# Patient Record
Sex: MALE | Race: WHITE | NOT HISPANIC OR LATINO | Employment: FULL TIME | URBAN - METROPOLITAN AREA
[De-identification: names, ages, dates, MRNs, and addresses within clinical notes are randomized per-mention and may not be internally consistent; named-entity substitution may affect disease eponyms.]

---

## 2020-02-02 ENCOUNTER — APPOINTMENT (OUTPATIENT)
Dept: URGENT CARE | Age: 35
End: 2020-02-02
Payer: OTHER MISCELLANEOUS

## 2020-02-02 ENCOUNTER — APPOINTMENT (OUTPATIENT)
Dept: RADIOLOGY | Age: 35
End: 2020-02-02
Attending: PHYSICIAN ASSISTANT
Payer: OTHER MISCELLANEOUS

## 2020-02-02 DIAGNOSIS — M25.562 ACUTE PAIN OF LEFT KNEE: Primary | ICD-10-CM

## 2020-02-02 DIAGNOSIS — M25.562 ACUTE PAIN OF LEFT KNEE: ICD-10-CM

## 2020-02-02 PROCEDURE — 73564 X-RAY EXAM KNEE 4 OR MORE: CPT

## 2020-02-02 PROCEDURE — G0382 LEV 3 HOSP TYPE B ED VISIT: HCPCS | Performed by: PHYSICIAN ASSISTANT

## 2020-02-02 PROCEDURE — 99283 EMERGENCY DEPT VISIT LOW MDM: CPT | Performed by: PHYSICIAN ASSISTANT

## 2020-02-13 ENCOUNTER — APPOINTMENT (OUTPATIENT)
Dept: URGENT CARE | Age: 35
End: 2020-02-13
Payer: OTHER MISCELLANEOUS

## 2020-02-13 PROCEDURE — 99213 OFFICE O/P EST LOW 20 MIN: CPT | Performed by: PREVENTIVE MEDICINE

## 2020-02-24 ENCOUNTER — APPOINTMENT (OUTPATIENT)
Dept: URGENT CARE | Age: 35
End: 2020-02-24
Payer: OTHER MISCELLANEOUS

## 2020-02-24 PROCEDURE — 99213 OFFICE O/P EST LOW 20 MIN: CPT | Performed by: PREVENTIVE MEDICINE

## 2020-03-04 VITALS — HEIGHT: 75 IN | BODY MASS INDEX: 39.17 KG/M2 | WEIGHT: 315 LBS

## 2020-03-04 DIAGNOSIS — M76.50 PATELLAR TENDINOSIS: ICD-10-CM

## 2020-03-04 DIAGNOSIS — S86.812A PATELLAR TENDON RUPTURE, LEFT, INITIAL ENCOUNTER: Primary | ICD-10-CM

## 2020-03-04 DIAGNOSIS — S83.282A ACUTE LATERAL MENISCUS TEAR OF LEFT KNEE, INITIAL ENCOUNTER: ICD-10-CM

## 2020-03-04 PROCEDURE — 99203 OFFICE O/P NEW LOW 30 MIN: CPT | Performed by: ORTHOPAEDIC SURGERY

## 2020-03-04 PROCEDURE — 20610 DRAIN/INJ JOINT/BURSA W/O US: CPT | Performed by: ORTHOPAEDIC SURGERY

## 2020-03-04 RX ORDER — BUPIVACAINE HYDROCHLORIDE 2.5 MG/ML
1 INJECTION, SOLUTION INFILTRATION; PERINEURAL
Status: COMPLETED | OUTPATIENT
Start: 2020-03-04 | End: 2020-03-04

## 2020-03-04 RX ORDER — LIDOCAINE HYDROCHLORIDE 10 MG/ML
1 INJECTION, SOLUTION INFILTRATION; PERINEURAL
Status: COMPLETED | OUTPATIENT
Start: 2020-03-04 | End: 2020-03-04

## 2020-03-04 RX ORDER — BETAMETHASONE SODIUM PHOSPHATE AND BETAMETHASONE ACETATE 3; 3 MG/ML; MG/ML
12 INJECTION, SUSPENSION INTRA-ARTICULAR; INTRALESIONAL; INTRAMUSCULAR; SOFT TISSUE
Status: COMPLETED | OUTPATIENT
Start: 2020-03-04 | End: 2020-03-04

## 2020-03-04 RX ADMIN — BETAMETHASONE SODIUM PHOSPHATE AND BETAMETHASONE ACETATE 12 MG: 3; 3 INJECTION, SUSPENSION INTRA-ARTICULAR; INTRALESIONAL; INTRAMUSCULAR; SOFT TISSUE at 11:38

## 2020-03-04 RX ADMIN — BUPIVACAINE HYDROCHLORIDE 1 ML: 2.5 INJECTION, SOLUTION INFILTRATION; PERINEURAL at 11:38

## 2020-03-04 RX ADMIN — LIDOCAINE HYDROCHLORIDE 1 ML: 10 INJECTION, SOLUTION INFILTRATION; PERINEURAL at 11:38

## 2020-03-04 NOTE — LETTER
March 4, 2020     Patient: Doneen Schwab   YOB: 1985   Date of Visit: 3/4/2020       To Whom it May Concern:    Doneen Schwab is under my professional care  He was seen in my office on 3/4/2020  He is to remain on sedentary desk duty only until next visit x 4 weeks  If you have any questions or concerns, please don't hesitate to call           Sincerely,          Pamela Larkin DO        CC: Doneen Schwab

## 2020-03-04 NOTE — PROGRESS NOTES
Assessment:  1  Patellar tendon rupture, left, initial encounter     2  Acute lateral meniscus tear of left knee, initial encounter  Large joint arthrocentesis: L knee   3  Patellar tendinosis       There is no problem list on file for this patient  Plan   Danial is diagnosed with a left knee lateral meniscus tear, patellar tendon tear and patellar tendinosis confirmed by MRI  Treatment options were discussed in detail with patient conservative vs surgical intervention  He has been in formal physical therapy x 4 weeks at this time  He received a CSI of the left knee at today's appointment  He is to continue with physical therapy  We discussed PRP injection for the left knee will hold off at this time to determine relief of symptoms from CSI injection  He will be seen back in the office x 4 weeks at that time pending symptoms PRP injection may be indicated and surgical intervention may be warranted due to failed conservative treatments  Subjective:     Patient ID:    Chief Complaint:Danial Radford 29 y o  male      HPI    Patient comes in today referred by occupational medicine with regards to left knee pain after kneeling on knee and when standing felt a pop while at work  The patient reports that the pain is in the lateral and anterior knee and has been going on for 6 months  The pain is rated at3 at its best and8 at its worst   The pain is described as tearing, burning  It is worsened with weight bearing, twisting, and is made better with rest   The patient has taken OTCs  for treatment  He has been in physical therapy x 4 weeks with minimal relief of symptoms up to this point        The following portions of the patient's history were reviewed and updated as appropriate: allergies, current medications, past family history, past social history, past surgical history and problem list         Social History     Socioeconomic History    Marital status: Unknown     Spouse name: Not on file    Number of children: Not on file    Years of education: Not on file    Highest education level: Not on file   Occupational History    Not on file   Social Needs    Financial resource strain: Not on file    Food insecurity:     Worry: Not on file     Inability: Not on file    Transportation needs:     Medical: Not on file     Non-medical: Not on file   Tobacco Use    Smoking status: Not on file   Substance and Sexual Activity    Alcohol use: Not on file    Drug use: Not on file    Sexual activity: Not on file   Lifestyle    Physical activity:     Days per week: Not on file     Minutes per session: Not on file    Stress: Not on file   Relationships    Social connections:     Talks on phone: Not on file     Gets together: Not on file     Attends [employer] service: Not on file     Active member of club or organization: Not on file     Attends meetings of clubs or organizations: Not on file     Relationship status: Not on file    Intimate partner violence:     Fear of current or ex partner: Not on file     Emotionally abused: Not on file     Physically abused: Not on file     Forced sexual activity: Not on file   Other Topics Concern    Not on file   Social History Narrative    Not on file     No past medical history on file  No past surgical history on file  No Known Allergies  No current outpatient medications on file prior to visit  No current facility-administered medications on file prior to visit  Objective:    Review of Systems   Constitutional: Negative  HENT: Negative  Eyes: Negative  Respiratory: Negative  Cardiovascular: Negative  Gastrointestinal: Negative  Endocrine: Negative  Genitourinary: Negative  Musculoskeletal: Positive for arthralgias, joint swelling and myalgias  Skin: Negative  Allergic/Immunologic: Negative  Neurological: Negative  Hematological: Negative  Psychiatric/Behavioral: Negative          Right Knee Exam Right knee exam is normal     Muscle Strength   The patient has normal right knee strength  Left Knee Exam     Muscle Strength   The patient has normal left knee strength  Tenderness   The patient is experiencing tenderness in the lateral joint line, patella and patellar tendon  Range of Motion   Extension: normal   Flexion: normal Left knee flexion: no crepitus with rom  Tests   Varus: negative Valgus: negative  Lachman:  Anterior - negative    Posterior - negative  Drawer:  Anterior - negative     Posterior - negative    Other   Erythema: absent  Scars: absent  Sensation: normal  Pulse: present  Swelling: none  Effusion: no effusion present    Comments: Thessaly's Test:  Positive  Cee's Test:  Questionable   Bounce Test:  Questionable               Physical Exam   Constitutional: He is oriented to person, place, and time  He appears well-developed  HENT:   Head: Normocephalic  Eyes: Conjunctivae are normal    Neck: Neck supple  Cardiovascular: Intact distal pulses  Pulmonary/Chest: Effort normal    Musculoskeletal:        Left knee: He exhibits no effusion  See left knee exam in note  Neurological: He is alert and oriented to person, place, and time  Skin: Skin is warm and dry  Psychiatric: He has a normal mood and affect         Large joint arthrocentesis: L knee  Date/Time: 3/4/2020 11:38 AM  Consent given by: patient  Site marked: site marked  Timeout: Immediately prior to procedure a time out was called to verify the correct patient, procedure, equipment, support staff and site/side marked as required   Supporting Documentation  Indications: pain and diagnostic evaluation   Procedure Details  Location: knee - L knee  Preparation: Patient was prepped and draped in the usual sterile fashion  Needle size: 22 G  Ultrasound guidance: no  Approach: anterolateral  Medications administered: 1 mL bupivacaine 0 25 %; 1 mL lidocaine 1 %; 12 mg betamethasone acetate-betamethasone sodium phosphate 6 (3-3) mg/mL    Patient tolerance: patient tolerated the procedure well with no immediate complications  Dressing:  Sterile dressing applied             I have personally reviewed pertinent films in PACS and my interpretation is MRI left knee:  Small partial tear of the patellar tendon  Tendinosis of the patellar tendon  Tear present of the lateral meniscus  Para-meniscal cyst present of the lateral knee          Scribe Attestation    I,:   Suzie Ledesma am acting as a scribe while in the presence of the attending physician :        I,:   Wilfred Zhao, DO personally performed the services described in this documentation    as scribed in my presence :            Portions of the record may have been created with voice recognition software   Occasional wrong word or "sound a like" substitutions may have occurred due to the inherent limitations of voice recognition software   Read the chart carefully and recognize, using context, where substitutions have occurred

## 2020-04-03 DIAGNOSIS — S86.812A PATELLAR TENDON RUPTURE, LEFT, INITIAL ENCOUNTER: ICD-10-CM

## 2020-04-03 DIAGNOSIS — M76.50 PATELLAR TENDINOSIS: ICD-10-CM

## 2020-04-03 DIAGNOSIS — S83.282D ACUTE LATERAL MENISCUS TEAR OF LEFT KNEE, SUBSEQUENT ENCOUNTER: Primary | ICD-10-CM

## 2020-04-03 PROBLEM — S83.282A ACUTE LATERAL MENISCUS TEAR OF LEFT KNEE: Status: ACTIVE | Noted: 2020-04-03

## 2020-04-03 PROCEDURE — G2012 BRIEF CHECK IN BY MD/QHP: HCPCS | Performed by: ORTHOPAEDIC SURGERY

## 2020-05-01 ENCOUNTER — OFFICE VISIT (OUTPATIENT)
Dept: OBGYN CLINIC | Facility: CLINIC | Age: 35
End: 2020-05-01
Payer: OTHER MISCELLANEOUS

## 2020-05-01 DIAGNOSIS — M76.50 PATELLAR TENDINOSIS: ICD-10-CM

## 2020-05-01 DIAGNOSIS — S83.282D ACUTE LATERAL MENISCUS TEAR OF LEFT KNEE, SUBSEQUENT ENCOUNTER: Primary | ICD-10-CM

## 2020-05-01 PROCEDURE — 99213 OFFICE O/P EST LOW 20 MIN: CPT | Performed by: PHYSICIAN ASSISTANT

## 2020-06-22 VITALS — BODY MASS INDEX: 39.17 KG/M2 | WEIGHT: 315 LBS | HEIGHT: 75 IN

## 2020-06-22 DIAGNOSIS — S83.282D ACUTE LATERAL MENISCUS TEAR OF LEFT KNEE, SUBSEQUENT ENCOUNTER: ICD-10-CM

## 2020-06-22 DIAGNOSIS — S86.812D PATELLAR TENDON RUPTURE, LEFT, SUBSEQUENT ENCOUNTER: Primary | ICD-10-CM

## 2020-06-22 PROCEDURE — 99213 OFFICE O/P EST LOW 20 MIN: CPT | Performed by: ORTHOPAEDIC SURGERY

## 2020-07-13 VITALS
SYSTOLIC BLOOD PRESSURE: 146 MMHG | WEIGHT: 315 LBS | HEART RATE: 93 BPM | HEIGHT: 75 IN | BODY MASS INDEX: 39.17 KG/M2 | DIASTOLIC BLOOD PRESSURE: 89 MMHG

## 2020-07-13 DIAGNOSIS — S86.812A PATELLAR TENDON RUPTURE, LEFT, INITIAL ENCOUNTER: Primary | ICD-10-CM

## 2020-07-13 PROCEDURE — 99212 OFFICE O/P EST SF 10 MIN: CPT | Performed by: ORTHOPAEDIC SURGERY

## 2020-07-13 PROCEDURE — 20610 DRAIN/INJ JOINT/BURSA W/O US: CPT | Performed by: ORTHOPAEDIC SURGERY

## 2020-07-13 NOTE — PROGRESS NOTES
1  Patellar tendon rupture, left, initial encounter       Patient is here for his 1st injection of PRP in the patellar tendon into the left knee  Patient reports pain  All organ systems normal  Physical exam of the knee shows no effusion no ecchymosis        Large joint arthrocentesis: L knee  Date/Time: 7/13/2020 11:01 AM  Consent given by: patient  Timeout: Immediately prior to procedure a time out was called to verify the correct patient, procedure, equipment, support staff and site/side marked as required   Supporting Documentation  Indications: pain   Procedure Details  Location: knee - L knee  Needle size: 22 G  Ultrasound guidance: no  Approach: anterolateral    Patient tolerance: patient tolerated the procedure well with no immediate complications    PRP  Two locations were injected 1 in for laterally and 1 superomedially and the patellar tendon          Patient tolerated procedure follow up 4 weeks

## 2020-07-13 NOTE — TELEPHONE ENCOUNTER
Bruising can be seen after PRP injection  He can use ice for the 1st 24-48 hours 20 minutes at a time 2-3x a day, elevate, motrin can be taken   If symptoms not better or worsening after 2-3 days he can call us back

## 2020-07-13 NOTE — TELEPHONE ENCOUNTER
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   Best contact [phone]  Thank you

## 2020-07-13 NOTE — TELEPHONE ENCOUNTER
Patient stated he is bruising from the PRP injection  Stated he would like to know if he can take motrin  Unsure of the PRP injection recommendations  Please clarify   Thank you

## 2020-07-13 NOTE — TELEPHONE ENCOUNTER
Spoke to patient, advised above information  Advised motrin 600 mg Q6h , not more than 2400 mg in 24hrs  Verbalized understanding

## 2020-08-12 ENCOUNTER — APPOINTMENT (OUTPATIENT)
Dept: LAB | Facility: AMBULARY SURGERY CENTER | Age: 35
End: 2020-08-12
Payer: OTHER MISCELLANEOUS

## 2020-08-12 VITALS
DIASTOLIC BLOOD PRESSURE: 90 MMHG | HEART RATE: 77 BPM | BODY MASS INDEX: 39.17 KG/M2 | SYSTOLIC BLOOD PRESSURE: 138 MMHG | HEIGHT: 75 IN | WEIGHT: 315 LBS

## 2020-08-12 DIAGNOSIS — Z01.818 PRE-OP EVALUATION: ICD-10-CM

## 2020-08-12 DIAGNOSIS — M76.50 PATELLAR TENDINOSIS: ICD-10-CM

## 2020-08-12 DIAGNOSIS — S83.282D ACUTE LATERAL MENISCUS TEAR OF LEFT KNEE, SUBSEQUENT ENCOUNTER: ICD-10-CM

## 2020-08-12 DIAGNOSIS — R73.09 ELEVATED RANDOM BLOOD GLUCOSE LEVEL: ICD-10-CM

## 2020-08-12 DIAGNOSIS — S83.282D ACUTE LATERAL MENISCUS TEAR OF LEFT KNEE, SUBSEQUENT ENCOUNTER: Primary | ICD-10-CM

## 2020-08-12 LAB
ANION GAP SERPL CALCULATED.3IONS-SCNC: 5 MMOL/L (ref 4–13)
BASOPHILS # BLD AUTO: 0.05 THOUSANDS/ΜL (ref 0–0.1)
BASOPHILS NFR BLD AUTO: 1 % (ref 0–1)
BUN SERPL-MCNC: 11 MG/DL (ref 5–25)
CALCIUM SERPL-MCNC: 8.8 MG/DL (ref 8.3–10.1)
CHLORIDE SERPL-SCNC: 110 MMOL/L (ref 100–108)
CO2 SERPL-SCNC: 26 MMOL/L (ref 21–32)
CREAT SERPL-MCNC: 0.84 MG/DL (ref 0.6–1.3)
EOSINOPHIL # BLD AUTO: 0.48 THOUSAND/ΜL (ref 0–0.61)
EOSINOPHIL NFR BLD AUTO: 7 % (ref 0–6)
ERYTHROCYTE [DISTWIDTH] IN BLOOD BY AUTOMATED COUNT: 12.8 % (ref 11.6–15.1)
GFR SERPL CREATININE-BSD FRML MDRD: 114 ML/MIN/1.73SQ M
GLUCOSE P FAST SERPL-MCNC: 141 MG/DL (ref 65–99)
HCT VFR BLD AUTO: 46 % (ref 36.5–49.3)
HGB BLD-MCNC: 15.3 G/DL (ref 12–17)
IMM GRANULOCYTES # BLD AUTO: 0.03 THOUSAND/UL (ref 0–0.2)
IMM GRANULOCYTES NFR BLD AUTO: 1 % (ref 0–2)
LYMPHOCYTES # BLD AUTO: 1.83 THOUSANDS/ΜL (ref 0.6–4.47)
LYMPHOCYTES NFR BLD AUTO: 28 % (ref 14–44)
MCH RBC QN AUTO: 30.3 PG (ref 26.8–34.3)
MCHC RBC AUTO-ENTMCNC: 33.3 G/DL (ref 31.4–37.4)
MCV RBC AUTO: 91 FL (ref 82–98)
MONOCYTES # BLD AUTO: 0.52 THOUSAND/ΜL (ref 0.17–1.22)
MONOCYTES NFR BLD AUTO: 8 % (ref 4–12)
NEUTROPHILS # BLD AUTO: 3.59 THOUSANDS/ΜL (ref 1.85–7.62)
NEUTS SEG NFR BLD AUTO: 55 % (ref 43–75)
NRBC BLD AUTO-RTO: 0 /100 WBCS
PLATELET # BLD AUTO: 214 THOUSANDS/UL (ref 149–390)
PMV BLD AUTO: 12.3 FL (ref 8.9–12.7)
POTASSIUM SERPL-SCNC: 3.9 MMOL/L (ref 3.5–5.3)
RBC # BLD AUTO: 5.05 MILLION/UL (ref 3.88–5.62)
SODIUM SERPL-SCNC: 141 MMOL/L (ref 136–145)
WBC # BLD AUTO: 6.5 THOUSAND/UL (ref 4.31–10.16)

## 2020-08-12 PROCEDURE — 80048 BASIC METABOLIC PNL TOTAL CA: CPT

## 2020-08-12 PROCEDURE — 83036 HEMOGLOBIN GLYCOSYLATED A1C: CPT

## 2020-08-12 PROCEDURE — 99213 OFFICE O/P EST LOW 20 MIN: CPT | Performed by: PHYSICIAN ASSISTANT

## 2020-08-12 PROCEDURE — 85025 COMPLETE CBC W/AUTO DIFF WBC: CPT

## 2020-08-12 PROCEDURE — 36415 COLL VENOUS BLD VENIPUNCTURE: CPT

## 2020-08-12 RX ORDER — CHLORHEXIDINE GLUCONATE 4 G/100ML
SOLUTION TOPICAL DAILY PRN
Status: CANCELLED | OUTPATIENT
Start: 2020-08-12

## 2020-08-12 NOTE — H&P (VIEW-ONLY)
Assessment/Plan:  1  Acute lateral meniscus tear of left knee, subsequent encounter  Crutches    Case request operating room: ARTHROSCOPY KNEE PARTIAL LATERAL MENISCECTOMY    Basic metabolic panel    CBC and differential    Case request operating room: ARTHROSCOPY KNEE PARTIAL LATERAL MENISCECTOMY   2  Patellar tendinosis       Patient Active Problem List   Diagnosis    Patellar tendon rupture, left, initial encounter    Acute lateral meniscus tear of left knee    Patellar tendinosis       Discussion/Summary:    29 y o  male  Left knee pain secondary to lateral meniscal tear, patellar tendinosis  Patient did not have any significant relief from the PRP injection however he is still having meniscal signs and symptoms consistent with lateral meniscus tear he has tried and failed conservative treatments for this as well  Including physical therapy  He would like to proceed with arthroscopic partial lateral meniscectomy  The procedure was discussed in some detail with him today, we will begin the process of getting him scheduled for surgery, on day of surgery risks and benefits will be reviewed by Dr Julia Concepcion  And formal consent will be obtained prior to surgery  He will follow up after surgery for suture removal    The assessment and plan were formulated by Dr Julia Concepcion and I assisted in carrying it out  Subjective:   Patient ID: Dennis Carcamo is a 29 y o  male   HPI    Patient presents to the office for follow up of  Left knee pain lateral meniscus tear patellar tendinosis    Since the last visit, the patient reports   No improvements with PRP injections directly into the patellar tendon  Still has anterior knee pain but also anterior lateral and posterior lateral knee pain sharp pain notes a knee times  Denies any worsening of the symptoms in the anterior knee  Denies any weakness in general    No numbness tingling fevers or chills   Patient  denies any new injuries to the   Left knee since the last visit      The following portions of the patient's history were reviewed and updated as appropriate: allergies, current medications, past family history, past social history, past surgical history and problem list     Social History     Socioeconomic History    Marital status: Single     Spouse name: Not on file    Number of children: Not on file    Years of education: Not on file    Highest education level: Not on file   Occupational History    Not on file   Social Needs    Financial resource strain: Not on file    Food insecurity     Worry: Not on file     Inability: Not on file    Transportation needs     Medical: Not on file     Non-medical: Not on file   Tobacco Use    Smoking status: Current Every Day Smoker     Packs/day: 0 25     Types: Cigarettes    Smokeless tobacco: Never Used   Substance and Sexual Activity    Alcohol use: Not on file    Drug use: Not on file    Sexual activity: Not on file   Lifestyle    Physical activity     Days per week: Not on file     Minutes per session: Not on file    Stress: Not on file   Relationships    Social connections     Talks on phone: Not on file     Gets together: Not on file     Attends [employer] service: Not on file     Active member of club or organization: Not on file     Attends meetings of clubs or organizations: Not on file     Relationship status: Not on file    Intimate partner violence     Fear of current or ex partner: Not on file     Emotionally abused: Not on file     Physically abused: Not on file     Forced sexual activity: Not on file   Other Topics Concern    Not on file   Social History Narrative    Not on file     History reviewed  No pertinent past medical history  History reviewed  No pertinent surgical history  No Known Allergies  No current outpatient medications on file prior to visit  No current facility-administered medications on file prior to visit          Review of Systems      Objective:    Vitals:    08/12/20 0903 BP: 138/90   Pulse: 77       Physical Exam  Constitutional:       General: He is not in acute distress  Appearance: He is well-developed  He is obese  HENT:      Head: Normocephalic and atraumatic  Eyes:      General: No scleral icterus  Conjunctiva/sclera: Conjunctivae normal    Neck:      Musculoskeletal: Neck supple  Trachea: No tracheal deviation  Cardiovascular:      Comments: No discernible arrhthymias   Pulmonary:      Effort: Pulmonary effort is normal  No respiratory distress  Musculoskeletal:      Left knee: He exhibits no effusion  Skin:     General: Skin is warm and dry  Neurological:      Mental Status: He is alert and oriented to person, place, and time  Psychiatric:         Behavior: Behavior normal          Left Knee Exam     Muscle Strength   The patient has normal left knee strength  Tenderness   The patient is experiencing tenderness in the patellar tendon and lateral joint line (Lateral joint line tenderness no palpable defect of the patellar tendon)  Range of Motion   The patient has normal left knee ROM  Tests   Cee:  Medial - negative Lateral - positive    Other   Erythema: absent  Scars: absent  Sensation: normal  Pulse: present  Swelling: none  Effusion: no effusion present            I have personally reviewed pertinent films in PACS  Procedures  No Procedures performed today    Portions of the record may have been created with voice recognition software  Occasional wrong word or "sound a like" substitutions may have occurred due to the inherent limitations of voice recognition software  Read the chart carefully and recognize, using context, where substitutions have occurred

## 2020-08-12 NOTE — PROGRESS NOTES
Assessment/Plan:  1  Acute lateral meniscus tear of left knee, subsequent encounter  Crutches    Case request operating room: ARTHROSCOPY KNEE PARTIAL LATERAL MENISCECTOMY    Basic metabolic panel    CBC and differential    Case request operating room: ARTHROSCOPY KNEE PARTIAL LATERAL MENISCECTOMY   2  Patellar tendinosis       Patient Active Problem List   Diagnosis    Patellar tendon rupture, left, initial encounter    Acute lateral meniscus tear of left knee    Patellar tendinosis       Discussion/Summary:    29 y o  male  Left knee pain secondary to lateral meniscal tear, patellar tendinosis  Patient did not have any significant relief from the PRP injection however he is still having meniscal signs and symptoms consistent with lateral meniscus tear he has tried and failed conservative treatments for this as well  Including physical therapy  He would like to proceed with arthroscopic partial lateral meniscectomy  The procedure was discussed in some detail with him today, we will begin the process of getting him scheduled for surgery, on day of surgery risks and benefits will be reviewed by Dr Romana Prose  And formal consent will be obtained prior to surgery  He will follow up after surgery for suture removal    The assessment and plan were formulated by Dr Romana Prose and I assisted in carrying it out  Subjective:   Patient ID: Iram Khan is a 29 y o  male   HPI    Patient presents to the office for follow up of  Left knee pain lateral meniscus tear patellar tendinosis    Since the last visit, the patient reports   No improvements with PRP injections directly into the patellar tendon  Still has anterior knee pain but also anterior lateral and posterior lateral knee pain sharp pain notes a knee times  Denies any worsening of the symptoms in the anterior knee  Denies any weakness in general    No numbness tingling fevers or chills   Patient  denies any new injuries to the   Left knee since the last visit      The following portions of the patient's history were reviewed and updated as appropriate: allergies, current medications, past family history, past social history, past surgical history and problem list     Social History     Socioeconomic History    Marital status: Single     Spouse name: Not on file    Number of children: Not on file    Years of education: Not on file    Highest education level: Not on file   Occupational History    Not on file   Social Needs    Financial resource strain: Not on file    Food insecurity     Worry: Not on file     Inability: Not on file    Transportation needs     Medical: Not on file     Non-medical: Not on file   Tobacco Use    Smoking status: Current Every Day Smoker     Packs/day: 0 25     Types: Cigarettes    Smokeless tobacco: Never Used   Substance and Sexual Activity    Alcohol use: Not on file    Drug use: Not on file    Sexual activity: Not on file   Lifestyle    Physical activity     Days per week: Not on file     Minutes per session: Not on file    Stress: Not on file   Relationships    Social connections     Talks on phone: Not on file     Gets together: Not on file     Attends [employer] service: Not on file     Active member of club or organization: Not on file     Attends meetings of clubs or organizations: Not on file     Relationship status: Not on file    Intimate partner violence     Fear of current or ex partner: Not on file     Emotionally abused: Not on file     Physically abused: Not on file     Forced sexual activity: Not on file   Other Topics Concern    Not on file   Social History Narrative    Not on file     History reviewed  No pertinent past medical history  History reviewed  No pertinent surgical history  No Known Allergies  No current outpatient medications on file prior to visit  No current facility-administered medications on file prior to visit          Review of Systems      Objective:    Vitals:    08/12/20 0903 BP: 138/90   Pulse: 77       Physical Exam  Constitutional:       General: He is not in acute distress  Appearance: He is well-developed  He is obese  HENT:      Head: Normocephalic and atraumatic  Eyes:      General: No scleral icterus  Conjunctiva/sclera: Conjunctivae normal    Neck:      Musculoskeletal: Neck supple  Trachea: No tracheal deviation  Cardiovascular:      Comments: No discernible arrhthymias   Pulmonary:      Effort: Pulmonary effort is normal  No respiratory distress  Musculoskeletal:      Left knee: He exhibits no effusion  Skin:     General: Skin is warm and dry  Neurological:      Mental Status: He is alert and oriented to person, place, and time  Psychiatric:         Behavior: Behavior normal          Left Knee Exam     Muscle Strength   The patient has normal left knee strength  Tenderness   The patient is experiencing tenderness in the patellar tendon and lateral joint line (Lateral joint line tenderness no palpable defect of the patellar tendon)  Range of Motion   The patient has normal left knee ROM  Tests   Cee:  Medial - negative Lateral - positive    Other   Erythema: absent  Scars: absent  Sensation: normal  Pulse: present  Swelling: none  Effusion: no effusion present            I have personally reviewed pertinent films in PACS  Procedures  No Procedures performed today    Portions of the record may have been created with voice recognition software  Occasional wrong word or "sound a like" substitutions may have occurred due to the inherent limitations of voice recognition software  Read the chart carefully and recognize, using context, where substitutions have occurred

## 2020-08-13 DIAGNOSIS — R73.09 ELEVATED RANDOM BLOOD GLUCOSE LEVEL: Primary | ICD-10-CM

## 2020-08-13 DIAGNOSIS — E66.01 MORBID OBESITY WITH BMI OF 45.0-49.9, ADULT (HCC): ICD-10-CM

## 2020-08-13 DIAGNOSIS — S83.282D ACUTE LATERAL MENISCUS TEAR OF LEFT KNEE, SUBSEQUENT ENCOUNTER: ICD-10-CM

## 2020-08-13 DIAGNOSIS — Z01.818 PRE-OP EVALUATION: ICD-10-CM

## 2020-08-13 LAB
EST. AVERAGE GLUCOSE BLD GHB EST-MCNC: 143 MG/DL
HBA1C MFR BLD: 6.6 %

## 2020-08-13 NOTE — TELEPHONE ENCOUNTER
I called and spoke to the patient  Informed him of elevated fasting blood glucose, he told me he had not been fasting directly before he had eaten about 4-5 hours prior to his BMP yesterday, I ordered an A1C to further evaluate prior to surgery  Can you please   Call the patient let him know that we are checking with the lab to see they can use his existing specimen from yesterday to do this new order    Can you please check with the lab then see if they can do this and let the patient know whether not he needs to come for an additional blood draw

## 2020-08-13 NOTE — TELEPHONE ENCOUNTER
I do not believe he has to be fasting for hemoglobin A1C   Dr Jermain Lezama also does want him to see a family doctor before surgery due to the elevated glucose can you please let him know and get him set up with family med

## 2020-08-13 NOTE — TELEPHONE ENCOUNTER
S/W [facility] lab  Pt will have to come in to have blood drawn for the hgb A1c  S/w pt he is aware he should be fasting and will have lab drawn over the next week

## 2020-08-14 ENCOUNTER — TELEPHONE (OUTPATIENT)
Dept: OBGYN CLINIC | Facility: CLINIC | Age: 35
End: 2020-08-14

## 2020-08-14 ENCOUNTER — TELEPHONE (OUTPATIENT)
Dept: OBGYN CLINIC | Facility: HOSPITAL | Age: 35
End: 2020-08-14

## 2020-08-14 DIAGNOSIS — E11.9 TYPE 2 DIABETES MELLITUS WITHOUT COMPLICATION, WITHOUT LONG-TERM CURRENT USE OF INSULIN (HCC): ICD-10-CM

## 2020-08-14 DIAGNOSIS — Z01.818 PRE-OP EVALUATION: Primary | ICD-10-CM

## 2020-08-14 DIAGNOSIS — E66.01 MORBID OBESITY WITH BMI OF 45.0-49.9, ADULT (HCC): ICD-10-CM

## 2020-08-14 NOTE — TELEPHONE ENCOUNTER
Called and spoke to the patient  I discussed his hemoglobin A1c that is within the diabetic range he has an appointment next week with the family doctor for clearance    I also ordered an EKG can you please contact him see if we can get him set up to do this or if the family doctor can do this for him

## 2020-08-27 ENCOUNTER — HOSPITAL ENCOUNTER (EMERGENCY)
Facility: HOSPITAL | Age: 35
Discharge: HOME | End: 2020-08-28
Attending: EMERGENCY MEDICINE | Admitting: EMERGENCY MEDICINE
Payer: COMMERCIAL

## 2020-08-27 ENCOUNTER — APPOINTMENT (EMERGENCY)
Dept: CT IMAGING | Facility: HOSPITAL | Age: 35
End: 2020-08-27
Payer: COMMERCIAL

## 2020-08-27 DIAGNOSIS — K52.9 JEJUNITIS: Primary | ICD-10-CM

## 2020-08-27 DIAGNOSIS — R11.0 NAUSEA: ICD-10-CM

## 2020-08-27 LAB
ALBUMIN SERPL BCP-MCNC: 3.9 G/DL (ref 3.5–5)
ALP SERPL-CCNC: 67 U/L (ref 46–116)
ALT SERPL W P-5'-P-CCNC: 93 U/L (ref 12–78)
ANION GAP SERPL CALCULATED.3IONS-SCNC: 7 MMOL/L (ref 4–13)
APTT PPP: 26 SECONDS (ref 23–37)
AST SERPL W P-5'-P-CCNC: 36 U/L (ref 5–45)
BASOPHILS # BLD AUTO: 0.04 THOUSANDS/ΜL (ref 0–0.1)
BASOPHILS NFR BLD AUTO: 0 % (ref 0–1)
BILIRUB SERPL-MCNC: 0.53 MG/DL (ref 0.2–1)
BILIRUB UR QL STRIP: NEGATIVE
BUN SERPL-MCNC: 11 MG/DL (ref 5–25)
CALCIUM SERPL-MCNC: 9.4 MG/DL (ref 8.3–10.1)
CHLORIDE SERPL-SCNC: 100 MMOL/L (ref 100–108)
CLARITY UR: CLEAR
CO2 SERPL-SCNC: 28 MMOL/L (ref 21–32)
COLOR UR: YELLOW
CREAT SERPL-MCNC: 0.98 MG/DL (ref 0.6–1.3)
EOSINOPHIL # BLD AUTO: 0.47 THOUSAND/ΜL (ref 0–0.61)
EOSINOPHIL NFR BLD AUTO: 4 % (ref 0–6)
ERYTHROCYTE [DISTWIDTH] IN BLOOD BY AUTOMATED COUNT: 12.4 % (ref 11.6–15.1)
GFR SERPL CREATININE-BSD FRML MDRD: 100 ML/MIN/1.73SQ M
GLUCOSE SERPL-MCNC: 165 MG/DL (ref 65–140)
GLUCOSE UR STRIP-MCNC: ABNORMAL MG/DL
HCT VFR BLD AUTO: 46.6 % (ref 36.5–49.3)
HGB BLD-MCNC: 16.2 G/DL (ref 12–17)
HGB UR QL STRIP.AUTO: NEGATIVE
IMM GRANULOCYTES # BLD AUTO: 0.08 THOUSAND/UL (ref 0–0.2)
IMM GRANULOCYTES NFR BLD AUTO: 1 % (ref 0–2)
INR PPP: 1 (ref 0.84–1.19)
KETONES UR STRIP-MCNC: NEGATIVE MG/DL
LACTATE SERPL-SCNC: 1.6 MMOL/L (ref 0.5–2)
LEUKOCYTE ESTERASE UR QL STRIP: NEGATIVE
LIPASE SERPL-CCNC: 329 U/L (ref 73–393)
LYMPHOCYTES # BLD AUTO: 1.52 THOUSANDS/ΜL (ref 0.6–4.47)
LYMPHOCYTES NFR BLD AUTO: 12 % (ref 14–44)
MCH RBC QN AUTO: 30.9 PG (ref 26.8–34.3)
MCHC RBC AUTO-ENTMCNC: 34.8 G/DL (ref 31.4–37.4)
MCV RBC AUTO: 89 FL (ref 82–98)
MONOCYTES # BLD AUTO: 0.8 THOUSAND/ΜL (ref 0.17–1.22)
MONOCYTES NFR BLD AUTO: 6 % (ref 4–12)
NEUTROPHILS # BLD AUTO: 10.11 THOUSANDS/ΜL (ref 1.85–7.62)
NEUTS SEG NFR BLD AUTO: 77 % (ref 43–75)
NITRITE UR QL STRIP: NEGATIVE
NRBC BLD AUTO-RTO: 0 /100 WBCS
PH UR STRIP.AUTO: 6 [PH]
PLATELET # BLD AUTO: 199 THOUSANDS/UL (ref 149–390)
PMV BLD AUTO: 11.4 FL (ref 8.9–12.7)
POTASSIUM SERPL-SCNC: 4.1 MMOL/L (ref 3.5–5.3)
PROT SERPL-MCNC: 7.9 G/DL (ref 6.4–8.2)
PROT UR STRIP-MCNC: NEGATIVE MG/DL
PROTHROMBIN TIME: 13.3 SECONDS (ref 11.6–14.5)
RBC # BLD AUTO: 5.24 MILLION/UL (ref 3.88–5.62)
SODIUM SERPL-SCNC: 135 MMOL/L (ref 136–145)
SP GR UR STRIP.AUTO: 1.01 (ref 1–1.03)
TROPONIN I SERPL-MCNC: <0.02 NG/ML
UROBILINOGEN UR QL STRIP.AUTO: 0.2 E.U./DL
WBC # BLD AUTO: 13.02 THOUSAND/UL (ref 4.31–10.16)

## 2020-08-27 PROCEDURE — 85025 COMPLETE CBC W/AUTO DIFF WBC: CPT | Performed by: EMERGENCY MEDICINE

## 2020-08-27 PROCEDURE — 85610 PROTHROMBIN TIME: CPT | Performed by: PHYSICIAN ASSISTANT

## 2020-08-27 PROCEDURE — 81003 URINALYSIS AUTO W/O SCOPE: CPT | Performed by: EMERGENCY MEDICINE

## 2020-08-27 PROCEDURE — 80053 COMPREHEN METABOLIC PANEL: CPT | Performed by: EMERGENCY MEDICINE

## 2020-08-27 PROCEDURE — 96375 TX/PRO/DX INJ NEW DRUG ADDON: CPT

## 2020-08-27 PROCEDURE — 83690 ASSAY OF LIPASE: CPT | Performed by: EMERGENCY MEDICINE

## 2020-08-27 PROCEDURE — 93005 ELECTROCARDIOGRAM TRACING: CPT

## 2020-08-27 PROCEDURE — 36415 COLL VENOUS BLD VENIPUNCTURE: CPT | Performed by: EMERGENCY MEDICINE

## 2020-08-27 PROCEDURE — 85730 THROMBOPLASTIN TIME PARTIAL: CPT | Performed by: PHYSICIAN ASSISTANT

## 2020-08-27 PROCEDURE — 96374 THER/PROPH/DIAG INJ IV PUSH: CPT

## 2020-08-27 PROCEDURE — 74177 CT ABD & PELVIS W/CONTRAST: CPT

## 2020-08-27 PROCEDURE — 96361 HYDRATE IV INFUSION ADD-ON: CPT

## 2020-08-27 PROCEDURE — G1004 CDSM NDSC: HCPCS

## 2020-08-27 PROCEDURE — 99284 EMERGENCY DEPT VISIT MOD MDM: CPT

## 2020-08-27 PROCEDURE — 83605 ASSAY OF LACTIC ACID: CPT | Performed by: PHYSICIAN ASSISTANT

## 2020-08-27 PROCEDURE — 84484 ASSAY OF TROPONIN QUANT: CPT | Performed by: EMERGENCY MEDICINE

## 2020-08-27 RX ORDER — DICYCLOMINE HCL 20 MG
20 TABLET ORAL ONCE
Status: COMPLETED | OUTPATIENT
Start: 2020-08-28 | End: 2020-08-28

## 2020-08-27 RX ORDER — KETOROLAC TROMETHAMINE 30 MG/ML
15 INJECTION, SOLUTION INTRAMUSCULAR; INTRAVENOUS ONCE
Status: COMPLETED | OUTPATIENT
Start: 2020-08-28 | End: 2020-08-28

## 2020-08-27 RX ORDER — LIDOCAINE HYDROCHLORIDE 20 MG/ML
15 SOLUTION OROPHARYNGEAL ONCE
Status: COMPLETED | OUTPATIENT
Start: 2020-08-27 | End: 2020-08-27

## 2020-08-27 RX ORDER — MAGNESIUM HYDROXIDE/ALUMINUM HYDROXICE/SIMETHICONE 120; 1200; 1200 MG/30ML; MG/30ML; MG/30ML
30 SUSPENSION ORAL ONCE
Status: COMPLETED | OUTPATIENT
Start: 2020-08-27 | End: 2020-08-27

## 2020-08-27 RX ORDER — ONDANSETRON 2 MG/ML
4 INJECTION INTRAMUSCULAR; INTRAVENOUS ONCE
Status: COMPLETED | OUTPATIENT
Start: 2020-08-27 | End: 2020-08-27

## 2020-08-27 RX ADMIN — ALUMINUM HYDROXIDE, MAGNESIUM HYDROXIDE, AND SIMETHICONE 30 ML: 200; 200; 20 SUSPENSION ORAL at 23:25

## 2020-08-27 RX ADMIN — FAMOTIDINE 20 MG: 10 INJECTION, SOLUTION INTRAVENOUS at 23:29

## 2020-08-27 RX ADMIN — LIDOCAINE HYDROCHLORIDE 15 ML: 20 SOLUTION ORAL; TOPICAL at 23:26

## 2020-08-27 RX ADMIN — SODIUM CHLORIDE 1000 ML: 0.9 INJECTION, SOLUTION INTRAVENOUS at 23:25

## 2020-08-27 RX ADMIN — ONDANSETRON 4 MG: 2 INJECTION INTRAMUSCULAR; INTRAVENOUS at 23:31

## 2020-08-28 VITALS
WEIGHT: 315 LBS | SYSTOLIC BLOOD PRESSURE: 152 MMHG | TEMPERATURE: 98.7 F | OXYGEN SATURATION: 97 % | RESPIRATION RATE: 18 BRPM | DIASTOLIC BLOOD PRESSURE: 90 MMHG | HEIGHT: 75 IN | BODY MASS INDEX: 39.17 KG/M2 | HEART RATE: 86 BPM

## 2020-08-28 LAB
ATRIAL RATE: 88 BPM
P AXIS: 46 DEGREES
PR INTERVAL: 158 MS
QRS AXIS: -5 DEGREES
QRSD INTERVAL: 96 MS
QT INTERVAL: 336 MS
QTC INTERVAL: 406 MS
T WAVE AXIS: 19 DEGREES
VENTRICULAR RATE: 88 BPM

## 2020-08-28 PROCEDURE — 99285 EMERGENCY DEPT VISIT HI MDM: CPT | Performed by: PHYSICIAN ASSISTANT

## 2020-08-28 PROCEDURE — 96361 HYDRATE IV INFUSION ADD-ON: CPT

## 2020-08-28 PROCEDURE — 96375 TX/PRO/DX INJ NEW DRUG ADDON: CPT

## 2020-08-28 PROCEDURE — 93010 ELECTROCARDIOGRAM REPORT: CPT | Performed by: INTERNAL MEDICINE

## 2020-08-28 RX ORDER — FAMOTIDINE 20 MG/1
20 TABLET, FILM COATED ORAL 2 TIMES DAILY
Qty: 6 TABLET | Refills: 0 | Status: SHIPPED | OUTPATIENT
Start: 2020-08-28 | End: 2020-09-03

## 2020-08-28 RX ORDER — ONDANSETRON 4 MG/1
4 TABLET, FILM COATED ORAL EVERY 6 HOURS
Qty: 12 TABLET | Refills: 0 | Status: SHIPPED | OUTPATIENT
Start: 2020-08-28 | End: 2020-09-03

## 2020-08-28 RX ORDER — SUCRALFATE 1 G/1
1 TABLET ORAL 4 TIMES DAILY
Qty: 12 TABLET | Refills: 0 | Status: SHIPPED | OUTPATIENT
Start: 2020-08-28 | End: 2020-09-03

## 2020-08-28 RX ADMIN — DICYCLOMINE HYDROCHLORIDE 20 MG: 20 TABLET ORAL at 00:24

## 2020-08-28 RX ADMIN — IOHEXOL 100 ML: 350 INJECTION, SOLUTION INTRAVENOUS at 00:09

## 2020-08-28 RX ADMIN — KETOROLAC TROMETHAMINE 15 MG: 30 INJECTION, SOLUTION INTRAMUSCULAR at 00:24

## 2020-08-28 NOTE — DISCHARGE INSTRUCTIONS
CT ABDOMEN AND PELVIS WITH IV CONTRAST     INDICATION:   Nausea and epigastric pain  COMPARISON:  None  TECHNIQUE:  CT examination of the abdomen and pelvis was performed  Axial, sagittal, and coronal 2D reformatted images were created from the source data and submitted for interpretation  Radiation dose length product (DLP) for this visit:  2342 mGy-cm   This examination, like all CT scans performed in the [facility], was performed utilizing techniques to minimize radiation dose exposure, including the use of iterative   reconstruction and automated exposure control  IV Contrast:  100 mL of iohexol (OMNIPAQUE)  Enteric Contrast:  Enteric contrast was not administered  FINDINGS:     ABDOMEN     LOWER CHEST:  Clear lung bases  LIVER/BILIARY TREE:  Unremarkable  GALLBLADDER:  No calcified gallstones  No pericholecystic inflammatory change  SPLEEN:  Unremarkable  PANCREAS:  Unremarkable  ADRENAL GLANDS:  Unremarkable  KIDNEYS/URETERS:  No pyelonephritis or obstructive uropathy  STOMACH AND BOWEL: No evidence of gastritis or duodenitis  Mild wall thickening and proximal loops of jejunum  Evaluation somewhat limited in the absence of contrast distention of the small bowel  No bowel obstruction, colitis or diverticulitis  APPENDIX:  No findings to suggest appendicitis  ABDOMINOPELVIC CAVITY:  No ascites  No pneumoperitoneum  No lymphadenopathy  VESSELS:  No abdominal aortic aneurysm  PELVIS     REPRODUCTIVE ORGANS:  No prostate enlargement  URINARY BLADDER:  Unremarkable  ABDOMINAL WALL/INGUINAL REGIONS:  Unremarkable  OSSEOUS STRUCTURES:  No acute fracture or destructive osseous lesion  IMPRESSION:        Findings suggestive of mild proximal jejunitis  Somewhat limited evaluation the absence of oral contrast distention of the small bowel  No evidence of bowel obstruction, colitis or diverticulitis  Workstation performed: [id number]     Take Pepcid, Carafate, and Zofran as indicated  Consume plenty fluids and electrolytes  Follow-up with GI as needed  Follow-up with PCP  Follow up emergency department symptoms persist or exacerbate

## 2020-08-28 NOTE — ED PROVIDER NOTES
History  Chief Complaint   Patient presents with    Abdominal Pain     Intermittent central abd pain since tueday  (+) nausea, no emesis  Normal urination, BM yesterday w/o difficulty  Patient is a 24-year-old male with no significant past medical surgical history that presents emergency department with gradual onset persistent and worsening aching nonradiating upper abdominal pain for 3 days  Patient has associated symptomatology of nausea symptoms beginning the current ED presentation of upper abdominal pain  Patient denies history of abdominal pain symptoms to present  Patient denies food allergy  Patient denies questionable dietary item intake  Patient denies new medications  Patient denies recent antibiotic use  Patient denies palliative factors with provocative factors of pressure to upper abdomen  Patient denies not effective treatment  Patient denies fevers, chills, vomiting, constipation urinary symptoms  Patient denies recent fall or recent trauma  Patient denies sick contacts or recent travel  Patient has pre-admission knee arthroscopy surgery for 09/08/2020  Patient denies chest pain and shortness of breath      History provided by:  Patient   used: No    Abdominal Pain   Pain location:  Epigastric  Pain quality: aching    Pain radiates to:  Does not radiate  Pain severity:  Mild  Onset quality:  Gradual  Duration:  3 days  Timing:  Intermittent  Progression:  Worsening  Chronicity:  New  Context: not alcohol use, not awakening from sleep, not diet changes, not eating, not laxative use, not medication withdrawal, not previous surgeries, not recent illness, not recent sexual activity, not sick contacts, not suspicious food intake and not trauma    Relieved by:  Nothing  Worsened by:  Palpation  Ineffective treatments:  None tried  Associated symptoms: nausea    Associated symptoms: no anorexia, no belching, no chest pain, no chills, no constipation, no cough, no diarrhea, no dysuria, no fatigue, no fever, no shortness of breath, no sore throat and no vomiting    Nausea:     Severity:  Mild    Onset quality:  Gradual    Duration:  3 days    Timing:  Constant    Progression:  Unchanged  Risk factors: no alcohol abuse, no aspirin use, has not had multiple surgeries, no NSAID use and no recent hospitalization        None       History reviewed  No pertinent past medical history  History reviewed  No pertinent surgical history  History reviewed  No pertinent family history  I have reviewed and agree with the history as documented  E-Cigarette/Vaping     E-Cigarette/Vaping Substances     Social History     Tobacco Use    Smoking status: Current Every Day Smoker     Packs/day: 0 25     Types: Cigarettes    Smokeless tobacco: Never Used   Substance Use Topics    Alcohol use: Not Currently    Drug use: Never       Review of Systems   Constitutional: Negative for activity change, appetite change, chills, fatigue and fever  HENT: Negative for congestion, postnasal drip, rhinorrhea, sinus pressure, sinus pain, sore throat and tinnitus  Eyes: Negative for photophobia and visual disturbance  Respiratory: Negative for cough, chest tightness and shortness of breath  Cardiovascular: Negative for chest pain and palpitations  Gastrointestinal: Positive for abdominal pain and nausea  Negative for anorexia, constipation, diarrhea and vomiting  Genitourinary: Negative for difficulty urinating, dysuria, flank pain, frequency and urgency  Musculoskeletal: Negative for back pain, gait problem, neck pain and neck stiffness  Skin: Negative for pallor and rash  Allergic/Immunologic: Negative for environmental allergies and food allergies  Neurological: Negative for dizziness, weakness, numbness and headaches  Psychiatric/Behavioral: Negative for confusion  All other systems reviewed and are negative        Physical Exam  Physical Exam  Vitals signs and nursing note reviewed  Constitutional:       General: He is awake  Appearance: Normal appearance  He is well-developed  He is not ill-appearing, toxic-appearing or diaphoretic  Comments: /90   Pulse 86   Temp 98 7 °F (37 1 °C) (Oral)   Resp 18   Ht 6' 3" (1 905 m)   Wt (!) 170 kg (375 lb)   SpO2 97%   BMI 46 87 kg/m²      HENT:      Head: Normocephalic and atraumatic  Right Ear: Hearing and external ear normal  No decreased hearing noted  No drainage, swelling or tenderness  No mastoid tenderness  Left Ear: Hearing and external ear normal  No decreased hearing noted  No drainage, swelling or tenderness  No mastoid tenderness  Nose: Nose normal       Mouth/Throat:      Lips: Pink  Mouth: Mucous membranes are moist       Pharynx: Oropharynx is clear  Uvula midline  Eyes:      General: Lids are normal  Vision grossly intact  Right eye: No discharge  Left eye: No discharge  Extraocular Movements: Extraocular movements intact  Conjunctiva/sclera: Conjunctivae normal       Pupils: Pupils are equal, round, and reactive to light  Neck:      Musculoskeletal: Full passive range of motion without pain, normal range of motion and neck supple  Normal range of motion  No neck rigidity, spinous process tenderness or muscular tenderness  Vascular: No JVD  Trachea: Trachea and phonation normal  No tracheal tenderness or tracheal deviation  Cardiovascular:      Rate and Rhythm: Normal rate and regular rhythm  Pulses: Normal pulses  Radial pulses are 2+ on the right side and 2+ on the left side  Posterior tibial pulses are 2+ on the right side and 2+ on the left side  Heart sounds: Normal heart sounds  Pulmonary:      Effort: Pulmonary effort is normal       Breath sounds: Normal breath sounds  No stridor  No decreased breath sounds, wheezing, rhonchi or rales  Chest:      Chest wall: No tenderness     Abdominal:      General: Abdomen is flat  Bowel sounds are normal  There is no distension  Palpations: Abdomen is soft  Abdomen is not rigid  Tenderness: There is abdominal tenderness in the epigastric area and periumbilical area  There is no right CVA tenderness, left CVA tenderness, guarding or rebound  Musculoskeletal: Normal range of motion  Lymphadenopathy:      Head:      Right side of head: No submental, submandibular, tonsillar, preauricular, posterior auricular or occipital adenopathy  Left side of head: No submental, submandibular, tonsillar, preauricular, posterior auricular or occipital adenopathy  Cervical: No cervical adenopathy  Right cervical: No superficial, deep or posterior cervical adenopathy  Left cervical: No superficial, deep or posterior cervical adenopathy  Skin:     General: Skin is warm  Capillary Refill: Capillary refill takes less than 2 seconds  Neurological:      General: No focal deficit present  Mental Status: He is alert and oriented to person, place, and time  GCS: GCS eye subscore is 4  GCS verbal subscore is 5  GCS motor subscore is 6  Sensory: No sensory deficit  Deep Tendon Reflexes: Reflexes are normal and symmetric  Reflex Scores:       Patellar reflexes are 2+ on the right side and 2+ on the left side  Psychiatric:         Mood and Affect: Mood normal          Speech: Speech normal          Behavior: Behavior normal  Behavior is cooperative  Thought Content:  Thought content normal          Judgment: Judgment normal          Vital Signs  ED Triage Vitals [08/27/20 2132]   Temperature Pulse Respirations Blood Pressure SpO2   98 7 °F (37 1 °C) 94 20 161/90 98 %      Temp Source Heart Rate Source Patient Position - Orthostatic VS BP Location FiO2 (%)   Oral Monitor Sitting Left arm --      Pain Score       6           Vitals:    08/27/20 2336 08/27/20 2345 08/28/20 0045 08/28/20 0115   BP: (!) 167/109 154/97 169/91 152/90 Pulse: 78 80 86 86   Patient Position - Orthostatic VS: Lying            Visual Acuity      ED Medications  Medications   sodium chloride 0 9 % bolus 1,000 mL (0 mL Intravenous Stopped 8/28/20 0119)   Lidocaine Viscous HCl (XYLOCAINE) 2 % mucosal solution 15 mL (15 mL Swish & Spit Given 8/27/20 2326)   aluminum-magnesium hydroxide-simethicone (MYLANTA) 200-200-20 mg/5 mL oral suspension 30 mL (30 mL Oral Given 8/27/20 2325)   famotidine (PEPCID) injection 20 mg (20 mg Intravenous Given 8/27/20 2329)   ondansetron (ZOFRAN) injection 4 mg (4 mg Intravenous Given 8/27/20 2331)   ketorolac (TORADOL) injection 15 mg (15 mg Intravenous Given 8/28/20 0024)   dicyclomine (BENTYL) tablet 20 mg (20 mg Oral Given 8/28/20 0024)   iohexol (OMNIPAQUE) 350 MG/ML injection (MULTI-DOSE) 100 mL (100 mL Intravenous Given 8/28/20 0009)       Diagnostic Studies  Results Reviewed     Procedure Component Value Units Date/Time    Lactic acid, plasma [[id number]]  (Normal) Collected:  08/27/20 2306    Lab Status:  Final result Specimen:  Blood from Arm, Right Updated:  08/27/20 2351     LACTIC ACID 1 6 mmol/L     Narrative:       Result may be elevated if tourniquet was used during collection      UA w Reflex to Microscopic w Reflex to Culture [[id number]]  (Abnormal) Collected:  08/27/20 2333    Lab Status:  Final result Specimen:  Urine, Clean Catch Updated:  08/27/20 2344     Color, UA Yellow     Clarity, UA Clear     Specific Gravity, UA 1 015     pH, UA 6 0     Leukocytes, UA Negative     Nitrite, UA Negative     Protein, UA Negative mg/dl      Glucose,  (1/10%) mg/dl      Ketones, UA Negative mg/dl      Urobilinogen, UA 0 2 E U /dl      Bilirubin, UA Negative     Blood, UA Negative    Comprehensive metabolic panel [[id number]]  (Abnormal) Collected:  08/27/20 2306    Lab Status:  Final result Specimen:  Blood from Arm, Right Updated:  08/27/20 2338     Sodium 135 mmol/L      Potassium 4 1 mmol/L      Chloride 100 mmol/L      CO2 28 mmol/L      ANION GAP 7 mmol/L      BUN 11 mg/dL      Creatinine 0 98 mg/dL      Glucose 165 mg/dL      Calcium 9 4 mg/dL      AST 36 U/L      ALT 93 U/L      Alkaline Phosphatase 67 U/L      Total Protein 7 9 g/dL      Albumin 3 9 g/dL      Total Bilirubin 0 53 mg/dL      eGFR 100 ml/min/1 73sq m     Narrative:       [place] guidelines for Chronic Kidney Disease (CKD):     Stage 1 with normal or high GFR (GFR > 90 mL/min/1 73 square meters)    Stage 2 Mild CKD (GFR = 60-89 mL/min/1 73 square meters)    Stage 3A Moderate CKD (GFR = 45-59 mL/min/1 73 square meters)    Stage 3B Moderate CKD (GFR = 30-44 mL/min/1 73 square meters)    Stage 4 Severe CKD (GFR = 15-29 mL/min/1 73 square meters)    Stage 5 End Stage CKD (GFR <15 mL/min/1 73 square meters)  Note: GFR calculation is accurate only with a steady state creatinine    Lipase [[id number]]  (Normal) Collected:  08/27/20 2306    Lab Status:  Final result Specimen:  Blood from Arm, Right Updated:  08/27/20 2338     Lipase 329 u/L     Troponin I [[id number]]  (Normal) Collected:  08/27/20 2306    Lab Status:  Final result Specimen:  Blood from Arm, Right Updated:  08/27/20 2334     Troponin I <0 02 ng/mL     Protime-INR [[id number]]  (Normal) Collected:  08/27/20 2306    Lab Status:  Final result Specimen:  Blood from Arm, Right Updated:  08/27/20 2327     Protime 13 3 seconds      INR 1 00    APTT [[id number]]  (Normal) Collected:  08/27/20 2306    Lab Status:  Final result Specimen:  Blood from Arm, Right Updated:  08/27/20 2327     PTT 26 seconds     CBC and differential [[id number]]  (Abnormal) Collected:  08/27/20 2306    Lab Status:  Final result Specimen:  Blood from Arm, Right Updated:  08/27/20 2318     WBC 13 02 Thousand/uL      RBC 5 24 Million/uL      Hemoglobin 16 2 g/dL      Hematocrit 46 6 %      MCV 89 fL      MCH 30 9 pg      MCHC 34 8 g/dL      RDW 12 4 %      MPV 11 4 fL      Platelets 480 Thousands/uL      nRBC 0 /100 WBCs      Neutrophils Relative 77 %      Immat GRANS % 1 %      Lymphocytes Relative 12 %      Monocytes Relative 6 %      Eosinophils Relative 4 %      Basophils Relative 0 %      Neutrophils Absolute 10 11 Thousands/µL      Immature Grans Absolute 0 08 Thousand/uL      Lymphocytes Absolute 1 52 Thousands/µL      Monocytes Absolute 0 80 Thousand/µL      Eosinophils Absolute 0 47 Thousand/µL      Basophils Absolute 0 04 Thousands/µL                  CT abdomen pelvis with contrast   ED Interpretation by Kasey Jefferson PA-C (08/28 0049)   Migel Jensen MD  [phone]  8/28/2020      Narrative & Impression     CT ABDOMEN AND PELVIS WITH IV CONTRAST     INDICATION:   Nausea and epigastric pain        COMPARISON:  None      TECHNIQUE:  CT examination of the abdomen and pelvis was performed  Axial, sagittal, and coronal 2D reformatted images were created from the source data and submitted for interpretation      Radiation dose length product (DLP) for this visit:  2342 mGy-cm   This examination, like all CT scans performed in the [facility], was performed utilizing techniques to minimize radiation dose exposure, including the use of iterative   reconstruction and automated exposure control      IV Contrast:  100 mL of iohexol (OMNIPAQUE)  Enteric Contrast:  Enteric contrast was not administered      FINDINGS:     ABDOMEN     LOWER CHEST:  Clear lung bases      LIVER/BILIARY TREE:  Unremarkable      GALLBLADDER:  No calcified gallstones  No pericholecystic inflammatory change      SPLEEN:  Unremar   kable      PANCREAS:  Unremarkable      ADRENAL GLANDS:  Unremarkable      KIDNEYS/URETERS:  No pyelonephritis or obstructive uropathy      STOMACH AND BOWEL: No evidence of gastritis or duodenitis  Mild wall thickening and proximal loops of jejunum  Evaluation somewhat limited in the absence of contrast distention of the small bowel    No bowel obstruction, colitis or diverticulitis      APPENDIX: No findings to suggest appendicitis      ABDOMINOPELVIC CAVITY:  No ascites  No pneumoperitoneum  No lymphadenopathy      VESSELS:  No abdominal aortic aneurysm      PELVIS     REPRODUCTIVE ORGANS:  No prostate enlargement      URINARY BLADDER:  Unremarkable      ABDOMINAL WALL/INGUINAL REGIONS:  Unremarkable      OSSEOUS STRUCTURES:  No acute fracture or destructive osseous lesion      IMPRESSION:        Findings suggestive of mild proximal jejunitis  Somewhat limited evaluation the absence of oral contrast distention of the small bowel  No evidence of bowel obstruction, colitis or d   iverticulitis           Workstation performed: [id number]             Final Result by Sanjuana Montiel MD (08/28 8163)         Findings suggestive of mild proximal jejunitis  Somewhat limited evaluation the absence of oral contrast distention of the small bowel  No evidence of bowel obstruction, colitis or diverticulitis              Workstation performed: [id number]                    Procedures  ECG 12 Lead Documentation Only    Date/Time: 8/27/2020 11:04 PM  Performed by: Marcelino Naik PA-C  Authorized by: Marcelino Naik PA-C     Indications / Diagnosis:  Upper abdominal pain  ECG reviewed by me, the ED Provider: yes    Patient location:  ED  Previous ECG:     Previous ECG:  Unavailable    Comparison to cardiac monitor: Yes    Interpretation:     Interpretation: normal    Rate:     ECG rate:  88    ECG rate assessment: normal    Rhythm:     Rhythm: sinus rhythm    Ectopy:     Ectopy: none    QRS:     QRS axis:  Normal    QRS intervals:  Normal  Conduction:     Conduction: normal    ST segments:     ST segments:  Normal  T waves:     T waves: normal               ED Course  ED Course as of Aug 28 0246   Fri Aug 28, 2020   0050 CT abdomen pelvis with contrast-impression-"        Findings suggestive of mild proximal jejunitis   Somewhat limited evaluation the absence of oral contrast distention of the small bowel   No evidence of bowel obstruction, colitis or diverticulitis "                      MICKEY/DAST-10      Most Recent Value   How many times in the past year have you    Used an illegal drug or used a prescription medication for non-medical reasons? Never Filed at: 08/27/2020 2135                                [facility]  Number of Diagnoses or Management Options  Jejunitis: new and does not require workup  Nausea: new and does not require workup     Amount and/or Complexity of Data Reviewed  Clinical lab tests: ordered and reviewed  Tests in the radiology section of CPT®: ordered and reviewed  Review and summarize past medical records: yes  Independent visualization of images, tracings, or specimens: yes    Risk of Complications, Morbidity, and/or Mortality  Presenting problems: moderate  Diagnostic procedures: moderate  Management options: low    Patient Progress  Patient progress: stable       Patient is a 78-year-old male with no significant past medical surgical history that presents emergency department with gradual onset persistent and worsening aching nonradiating upper abdominal pain for 3 days  Patient has associated symptomatology of nausea symptoms beginning the current ED presentation of upper abdominal pain  Patient hemodynamically stable and afebrile  No sirs  CT abdomen pelvis with contrast demonstrates mild proximal jejunitis"  Patient states that prior to ED presentation of abdominal pain, nausea vomiting symptoms began, he had eaten a sandwich from a [employer] food restaurant that smelled funny   Mild leukocytosis with no banding; no lactic acidosis with systemic infection not likely  Urinalysis with no indication of urinary tract infection-glucose 100; serum glucose 165  Patient denies history of diabetes    Counseled patient on following with PCP for glucosuria and hyperglycemia; patient had recent A1c 6 6 -08/12/2020  ECG with normal sinus rhythm; negative troponin with MI not likely  Normal lipase with acute pancreatitis not likely  Delivered Mylanta, Bentyl, Pepcid, Toradol, viscous lidocaine, Zofran, with patient verbalized decrease in abdominal pain and nausea symptoms status post medication delivery  Prescribed Carafate, Pepcid, and Zofran and counseled patient medication administration and side effects  Follow-up with GI as needed  Follow-up with PCP  Follow up emergency department symptoms persist or exacerbate  Patient demonstrates verbal understanding all clinical laboratory and imaging findings, discharge instructions, follow-up and verbalized agreement with current treatment plan  Disposition  Final diagnoses:   Jejunitis   Nausea     Time reflects when diagnosis was documented in both MDM as applicable and the Disposition within this note     Time User Action Codes Description Comment    8/28/2020  1:05 AM Mynor Martinez Add [K52 9] Jejunitis     8/28/2020  1:05 AM Mynor Martinez Add [R11 0] Nausea       ED Disposition     ED Disposition Condition Date/Time Comment    Discharge Stable Fri Aug 28, 2020  1:04 AM Hina Lester discharge to home/self care              Follow-up Information     Follow up With Specialties Details Why Contact Info Additional [facility] [place] [facility] Call  As needed [address] [id number] [facility] [place], [address] [facility] [address] [place], [place], [address] [phone]    Radha Borja MD Family Medicine Call in 1 week for further evaluation of symptoms [address]  [address] [phone]       [address] Emergency Department Emergency Medicine Go to  As needed [address]  [phone] [facility], [address], [place], [place], [address]          Discharge Medication List as of 8/28/2020  1:07 AM      START taking these medications Details   famotidine (PEPCID) 20 mg tablet Take 1 tablet (20 mg total) by mouth 2 (two) times a day for 3 days, Starting Fri 8/28/2020, Until Mon 8/31/2020, Normal      ondansetron (ZOFRAN) 4 mg tablet Take 1 tablet (4 mg total) by mouth every 6 (six) hours for 3 days, Starting Fri 8/28/2020, Until Mon 8/31/2020, Normal      sucralfate (CARAFATE) 1 g tablet Take 1 tablet (1 g total) by mouth 4 (four) times a day for 3 days, Starting Fri 8/28/2020, Until Mon 8/31/2020, Normal           No discharge procedures on file      PDMP Review     None          ED Provider  Electronically Signed by           Mikaela Morales PA-C  08/28/20 0247

## 2020-09-03 NOTE — PRE-PROCEDURE INSTRUCTIONS
No outpatient medications have been marked as taking for the 9/8/20 encounter [facility] Encounter)      Pre op,medications and showering instructions reviewed-Patient has hibiclens

## 2020-09-07 ENCOUNTER — ANESTHESIA EVENT (OUTPATIENT)
Dept: PERIOP | Facility: HOSPITAL | Age: 35
End: 2020-09-07
Payer: OTHER MISCELLANEOUS

## 2020-09-07 PROBLEM — E66.01 MORBID OBESITY WITH BMI OF 45.0-49.9, ADULT (HCC): Status: ACTIVE | Noted: 2020-09-07

## 2020-09-07 NOTE — ANESTHESIA PREPROCEDURE EVALUATION
Procedure:  KNEE ARTHROSCOPY PARTIAL LATERAL MENISCECTOMY (Left Knee)    Relevant Problems   Other   (+) Morbid obesity with BMI of 45 0-49 9, adult (HCC)   (+) Tobacco use        Physical Exam    Airway    Mallampati score: III  TM Distance: >3 FB  Neck ROM: full     Dental   No notable dental hx     Cardiovascular  Rhythm: regular, Rate: normal, Cardiovascular exam normal    Pulmonary  Pulmonary exam normal Breath sounds clear to auscultation,     Other Findings        Anesthesia Plan  ASA Score- 3     Anesthesia Type- general with ASA Monitors  Additional Monitors:   Airway Plan: ETT  Comment: Risks/benefits and alternatives discussed with patient including likely possibility of PONV and sore throat, as well as the rare possibilities of aspiration, dental/oropharyngeal/ocular injuries, or grave/life threatening anesthetic and surgical emergencies          Plan Factors-Exercise tolerance (METS): >4 METS  Patient summary reviewed  Patient is a current smoker  Patient instructed to abstain from smoking on day of procedure  Patient did not smoke on day of surgery  Induction- intravenous  Postoperative Plan- Plan for postoperative opioid use  Planned trial extubation    Informed Consent- Anesthetic plan and risks discussed with patient  I personally reviewed this patient with the CRNA  Discussed and agreed on the Anesthesia Plan with the CRNA  Darling Ballesteros

## 2020-09-08 ENCOUNTER — ANESTHESIA (OUTPATIENT)
Dept: PERIOP | Facility: HOSPITAL | Age: 35
End: 2020-09-08
Payer: OTHER MISCELLANEOUS

## 2020-09-08 ENCOUNTER — HOSPITAL ENCOUNTER (OUTPATIENT)
Facility: HOSPITAL | Age: 35
Setting detail: OUTPATIENT SURGERY
Discharge: HOME | End: 2020-09-08
Attending: ORTHOPAEDIC SURGERY | Admitting: ORTHOPAEDIC SURGERY
Payer: OTHER MISCELLANEOUS

## 2020-09-08 VITALS
BODY MASS INDEX: 39.17 KG/M2 | DIASTOLIC BLOOD PRESSURE: 66 MMHG | WEIGHT: 315 LBS | TEMPERATURE: 97.5 F | HEIGHT: 75 IN | HEART RATE: 85 BPM | OXYGEN SATURATION: 97 % | SYSTOLIC BLOOD PRESSURE: 108 MMHG | RESPIRATION RATE: 22 BRPM

## 2020-09-08 VITALS — HEART RATE: 99 BPM

## 2020-09-08 DIAGNOSIS — S83.282D ACUTE LATERAL MENISCUS TEAR OF LEFT KNEE, SUBSEQUENT ENCOUNTER: Primary | ICD-10-CM

## 2020-09-08 PROBLEM — Z72.0 TOBACCO USE: Status: ACTIVE | Noted: 2020-09-08

## 2020-09-08 PROCEDURE — 29881 ARTHRS KNE SRG MNISECTMY M/L: CPT | Performed by: ORTHOPAEDIC SURGERY

## 2020-09-08 RX ORDER — HYDROMORPHONE HCL/PF 1 MG/ML
0.5 SYRINGE (ML) INJECTION
Status: DISCONTINUED | OUTPATIENT
Start: 2020-09-08 | End: 2020-09-08 | Stop reason: HOSPADM

## 2020-09-08 RX ORDER — FENTANYL CITRATE/PF 50 MCG/ML
50 SYRINGE (ML) INJECTION
Status: COMPLETED | OUTPATIENT
Start: 2020-09-08 | End: 2020-09-08

## 2020-09-08 RX ORDER — OXYCODONE HYDROCHLORIDE 5 MG/1
10 TABLET ORAL EVERY 4 HOURS PRN
Status: DISCONTINUED | OUTPATIENT
Start: 2020-09-08 | End: 2020-09-08

## 2020-09-08 RX ORDER — ONDANSETRON 2 MG/ML
4 INJECTION INTRAMUSCULAR; INTRAVENOUS ONCE AS NEEDED
Status: DISCONTINUED | OUTPATIENT
Start: 2020-09-08 | End: 2020-09-08 | Stop reason: HOSPADM

## 2020-09-08 RX ORDER — LIDOCAINE HYDROCHLORIDE 10 MG/ML
0.5 INJECTION, SOLUTION EPIDURAL; INFILTRATION; INTRACAUDAL; PERINEURAL ONCE AS NEEDED
Status: DISCONTINUED | OUTPATIENT
Start: 2020-09-08 | End: 2020-09-08 | Stop reason: HOSPADM

## 2020-09-08 RX ORDER — LIDOCAINE HYDROCHLORIDE 10 MG/ML
INJECTION, SOLUTION EPIDURAL; INFILTRATION; INTRACAUDAL; PERINEURAL AS NEEDED
Status: DISCONTINUED | OUTPATIENT
Start: 2020-09-08 | End: 2020-09-08

## 2020-09-08 RX ORDER — ONDANSETRON 2 MG/ML
4 INJECTION INTRAMUSCULAR; INTRAVENOUS EVERY 6 HOURS PRN
Status: DISCONTINUED | OUTPATIENT
Start: 2020-09-08 | End: 2020-09-08

## 2020-09-08 RX ORDER — ALBUTEROL SULFATE 2.5 MG/3ML
2.5 SOLUTION RESPIRATORY (INHALATION) ONCE AS NEEDED
Status: DISCONTINUED | OUTPATIENT
Start: 2020-09-08 | End: 2020-09-08 | Stop reason: HOSPADM

## 2020-09-08 RX ORDER — BUPIVACAINE HYDROCHLORIDE AND EPINEPHRINE 2.5; 5 MG/ML; UG/ML
INJECTION, SOLUTION INFILTRATION; PERINEURAL AS NEEDED
Status: DISCONTINUED | OUTPATIENT
Start: 2020-09-08 | End: 2020-09-08 | Stop reason: HOSPADM

## 2020-09-08 RX ORDER — GLYCOPYRROLATE 0.2 MG/ML
INJECTION INTRAMUSCULAR; INTRAVENOUS AS NEEDED
Status: DISCONTINUED | OUTPATIENT
Start: 2020-09-08 | End: 2020-09-08

## 2020-09-08 RX ORDER — OXYCODONE HYDROCHLORIDE 5 MG/1
5 TABLET ORAL EVERY 4 HOURS PRN
Status: DISCONTINUED | OUTPATIENT
Start: 2020-09-08 | End: 2020-09-08

## 2020-09-08 RX ORDER — DEXAMETHASONE SODIUM PHOSPHATE 10 MG/ML
INJECTION, SOLUTION INTRAMUSCULAR; INTRAVENOUS AS NEEDED
Status: DISCONTINUED | OUTPATIENT
Start: 2020-09-08 | End: 2020-09-08

## 2020-09-08 RX ORDER — MIDAZOLAM HYDROCHLORIDE 2 MG/2ML
INJECTION, SOLUTION INTRAMUSCULAR; INTRAVENOUS AS NEEDED
Status: DISCONTINUED | OUTPATIENT
Start: 2020-09-08 | End: 2020-09-08

## 2020-09-08 RX ORDER — ONDANSETRON 2 MG/ML
4 INJECTION INTRAMUSCULAR; INTRAVENOUS EVERY 6 HOURS PRN
Status: DISCONTINUED | OUTPATIENT
Start: 2020-09-08 | End: 2020-09-08 | Stop reason: HOSPADM

## 2020-09-08 RX ORDER — NEOSTIGMINE METHYLSULFATE 1 MG/ML
INJECTION INTRAVENOUS AS NEEDED
Status: DISCONTINUED | OUTPATIENT
Start: 2020-09-08 | End: 2020-09-08

## 2020-09-08 RX ORDER — DIPHENHYDRAMINE HYDROCHLORIDE 50 MG/ML
12.5 INJECTION INTRAMUSCULAR; INTRAVENOUS ONCE AS NEEDED
Status: DISCONTINUED | OUTPATIENT
Start: 2020-09-08 | End: 2020-09-08 | Stop reason: HOSPADM

## 2020-09-08 RX ORDER — OXYCODONE HYDROCHLORIDE 5 MG/1
5 TABLET ORAL EVERY 4 HOURS PRN
Status: DISCONTINUED | OUTPATIENT
Start: 2020-09-08 | End: 2020-09-08 | Stop reason: HOSPADM

## 2020-09-08 RX ORDER — CEFAZOLIN SODIUM 2 G/50ML
2000 SOLUTION INTRAVENOUS ONCE
Status: COMPLETED | OUTPATIENT
Start: 2020-09-08 | End: 2020-09-08

## 2020-09-08 RX ORDER — SODIUM CHLORIDE, SODIUM LACTATE, POTASSIUM CHLORIDE, CALCIUM CHLORIDE 600; 310; 30; 20 MG/100ML; MG/100ML; MG/100ML; MG/100ML
125 INJECTION, SOLUTION INTRAVENOUS CONTINUOUS
Status: DISCONTINUED | OUTPATIENT
Start: 2020-09-08 | End: 2020-09-08 | Stop reason: HOSPADM

## 2020-09-08 RX ORDER — PROPOFOL 10 MG/ML
INJECTION, EMULSION INTRAVENOUS AS NEEDED
Status: DISCONTINUED | OUTPATIENT
Start: 2020-09-08 | End: 2020-09-08

## 2020-09-08 RX ORDER — FENTANYL CITRATE 50 UG/ML
INJECTION, SOLUTION INTRAMUSCULAR; INTRAVENOUS AS NEEDED
Status: DISCONTINUED | OUTPATIENT
Start: 2020-09-08 | End: 2020-09-08

## 2020-09-08 RX ORDER — CHLORHEXIDINE GLUCONATE 4 G/100ML
SOLUTION TOPICAL DAILY PRN
Status: DISCONTINUED | OUTPATIENT
Start: 2020-09-08 | End: 2020-09-08 | Stop reason: HOSPADM

## 2020-09-08 RX ORDER — DEXMEDETOMIDINE HYDROCHLORIDE 100 UG/ML
INJECTION, SOLUTION INTRAVENOUS AS NEEDED
Status: DISCONTINUED | OUTPATIENT
Start: 2020-09-08 | End: 2020-09-08

## 2020-09-08 RX ORDER — OXYCODONE HYDROCHLORIDE AND ACETAMINOPHEN 5; 325 MG/1; MG/1
1 TABLET ORAL EVERY 6 HOURS PRN
Qty: 7 TABLET | Refills: 0 | Status: SHIPPED | OUTPATIENT
Start: 2020-09-08 | End: 2020-09-15

## 2020-09-08 RX ORDER — ROCURONIUM BROMIDE 10 MG/ML
INJECTION, SOLUTION INTRAVENOUS AS NEEDED
Status: DISCONTINUED | OUTPATIENT
Start: 2020-09-08 | End: 2020-09-08

## 2020-09-08 RX ORDER — ONDANSETRON 2 MG/ML
INJECTION INTRAMUSCULAR; INTRAVENOUS AS NEEDED
Status: DISCONTINUED | OUTPATIENT
Start: 2020-09-08 | End: 2020-09-08

## 2020-09-08 RX ORDER — MORPHINE SULFATE 4 MG/ML
2 INJECTION, SOLUTION INTRAMUSCULAR; INTRAVENOUS EVERY 4 HOURS PRN
Status: DISCONTINUED | OUTPATIENT
Start: 2020-09-08 | End: 2020-09-08

## 2020-09-08 RX ORDER — PANTOPRAZOLE SODIUM 40 MG/1
40 INJECTION, POWDER, FOR SOLUTION INTRAVENOUS ONCE
Status: DISCONTINUED | OUTPATIENT
Start: 2020-09-08 | End: 2020-09-08 | Stop reason: HOSPADM

## 2020-09-08 RX ORDER — CEFAZOLIN SODIUM 1 G/50ML
1000 SOLUTION INTRAVENOUS ONCE
Status: COMPLETED | OUTPATIENT
Start: 2020-09-08 | End: 2020-09-08

## 2020-09-08 RX ORDER — OXYCODONE HYDROCHLORIDE 5 MG/1
10 TABLET ORAL EVERY 4 HOURS PRN
Status: DISCONTINUED | OUTPATIENT
Start: 2020-09-08 | End: 2020-09-08 | Stop reason: HOSPADM

## 2020-09-08 RX ORDER — MORPHINE SULFATE 4 MG/ML
2 INJECTION, SOLUTION INTRAMUSCULAR; INTRAVENOUS EVERY 2 HOUR PRN
Status: DISCONTINUED | OUTPATIENT
Start: 2020-09-08 | End: 2020-09-08 | Stop reason: HOSPADM

## 2020-09-08 RX ORDER — SUCCINYLCHOLINE/SOD CL,ISO/PF 100 MG/5ML
SYRINGE (ML) INTRAVENOUS AS NEEDED
Status: DISCONTINUED | OUTPATIENT
Start: 2020-09-08 | End: 2020-09-08

## 2020-09-08 RX ADMIN — Medication 160 MG: at 07:37

## 2020-09-08 RX ADMIN — FENTANYL CITRATE 50 MCG: 50 INJECTION INTRAMUSCULAR; INTRAVENOUS at 07:37

## 2020-09-08 RX ADMIN — DEXMEDETOMIDINE HYDROCHLORIDE 4 MCG: 100 INJECTION, SOLUTION INTRAVENOUS at 08:20

## 2020-09-08 RX ADMIN — DEXMEDETOMIDINE HYDROCHLORIDE 8 MCG: 100 INJECTION, SOLUTION INTRAVENOUS at 08:00

## 2020-09-08 RX ADMIN — NEOSTIGMINE METHYLSULFATE 2 MG: 1 INJECTION INTRAVENOUS at 08:18

## 2020-09-08 RX ADMIN — DEXMEDETOMIDINE HYDROCHLORIDE 8 MCG: 100 INJECTION, SOLUTION INTRAVENOUS at 08:04

## 2020-09-08 RX ADMIN — FENTANYL CITRATE 50 MCG: 50 INJECTION INTRAMUSCULAR; INTRAVENOUS at 08:48

## 2020-09-08 RX ADMIN — GLYCOPYRROLATE 0.4 MG: 0.2 INJECTION, SOLUTION INTRAMUSCULAR; INTRAVENOUS at 08:18

## 2020-09-08 RX ADMIN — CEFAZOLIN SODIUM 2000 MG: 2 SOLUTION INTRAVENOUS at 07:40

## 2020-09-08 RX ADMIN — OXYCODONE HYDROCHLORIDE 10 MG: 5 TABLET ORAL at 09:37

## 2020-09-08 RX ADMIN — LIDOCAINE HYDROCHLORIDE 50 MG: 10 INJECTION, SOLUTION EPIDURAL; INFILTRATION; INTRACAUDAL; PERINEURAL at 07:37

## 2020-09-08 RX ADMIN — ROCURONIUM BROMIDE 20 MG: 10 SOLUTION INTRAVENOUS at 07:41

## 2020-09-08 RX ADMIN — ONDANSETRON 4 MG: 2 INJECTION INTRAMUSCULAR; INTRAVENOUS at 08:21

## 2020-09-08 RX ADMIN — MIDAZOLAM HYDROCHLORIDE 2 MG: 1 INJECTION, SOLUTION INTRAMUSCULAR; INTRAVENOUS at 07:32

## 2020-09-08 RX ADMIN — FENTANYL CITRATE 50 MCG: 50 INJECTION INTRAMUSCULAR; INTRAVENOUS at 08:55

## 2020-09-08 RX ADMIN — CEFAZOLIN SODIUM 1000 MG: 1 SOLUTION INTRAVENOUS at 07:41

## 2020-09-08 RX ADMIN — FENTANYL CITRATE 50 MCG: 50 INJECTION INTRAMUSCULAR; INTRAVENOUS at 07:53

## 2020-09-08 RX ADMIN — DEXAMETHASONE SODIUM PHOSPHATE 8 MG: 10 INJECTION, SOLUTION INTRAMUSCULAR; INTRAVENOUS at 07:41

## 2020-09-08 RX ADMIN — SODIUM CHLORIDE, SODIUM LACTATE, POTASSIUM CHLORIDE, AND CALCIUM CHLORIDE: .6; .31; .03; .02 INJECTION, SOLUTION INTRAVENOUS at 07:23

## 2020-09-08 RX ADMIN — PROPOFOL 350 MG: 10 INJECTION, EMULSION INTRAVENOUS at 07:37

## 2020-09-08 NOTE — OP NOTE
OPERATIVE REPORT  PATIENT NAME: Cyndie Mancini    :  1985  MRN: [id number]  Pt Location: [facility]OM 01    SURGERY DATE: 2020    Surgeon(s) and Role:     Gabbi Miss, DO - Primary    Preop Diagnosis:  Acute lateral meniscus tear of left knee, subsequent encounter [3 282D]    Post-Op Diagnosis Codes:     * Acute lateral meniscus tear of left knee, subsequent encounter [K13 282D]    Procedure(s) (LRB):  KNEE ARTHROSCOPY PARTIAL LATERAL MENISCECTOMY (Left)    Specimen(s):  * No specimens in log *    Estimated Blood Loss:   Minimal    Drains:  * No LDAs found *    Anesthesia Type:   General    Operative Indications:  Acute lateral meniscus tear of left knee, subsequent encounter [M77 282D]      Operative Findings:  Very small delamination tearing of the posterior horn lateral meniscus there is also fraying of the body of the body of the lateral meniscus and the anterior horn of the medial meniscus  Grade 1 changes throughout articular surface there is grade 2 changes in the trochlea    Complications:   None    Procedure and Technique:  Patient was seen and examined in the preoperative area  The left lower extremity was marked, the consent an H&P had been reviewed  The patient was brought back to the operative suite  The patient was intubated sedated  The left lower extremity was prepped and draped in a sterile fashion  After proper timeout commenced and identified the left lower extremity as the operative site  Injection of quarter percent Marcaine with epinephrine was injected in both medial lateral portal site  Incision over the lateral portal was made with eleven blade  We performed a diagnostic arthroscopy  We used the arthroscope and spinal needle to located our medial portal, and made incision with 11 blade and dilated with hemostat  We completed our diagnostic arthroscopy    We found Very small delamination tearing of the posterior horn lateral meniscus there is also fraying of the body of the body of the lateral meniscus and the anterior horn of the medial meniscus  Grade 1 changes throughout articular surface there is grade 2 changes in the trochlea  We debrided our meniscus back to a stable by using electro cautery device, shaver, and biters  We cauterized any inflammatory synovial tissue We copiously irrigated the wound  We closed the incision with 3-0 nylon  Xeroform was applied to the incisions  4 x 4's ABDs , web roll and an Ace wrap were applied to left lower extremity  The patient was taken back to PACU after anesthesia was reversed        I was present for the entire procedure, I was present for all critical portions of the procedure and A qualified resident physician was not available    Patient Disposition:  PACU     SIGNATURE: Ana Cristina Walker DO  DATE: September 8, 2020  TIME: 7:18 AM

## 2020-09-08 NOTE — ANESTHESIA POSTPROCEDURE EVALUATION
Post-Op Assessment Note    CV Status:  Stable  Pain Score: 0    Pain management: adequate     Mental Status:  Alert and awake   Hydration Status:  Euvolemic   PONV Controlled:  Controlled   Airway Patency:  Patent      Post Op Vitals Reviewed: Yes      Staff: CRNA         No complications documented      BP (P) 112/58 (09/08/20 0833)    Temp (!) (P) 97 3 °F (36 3 °C) (09/08/20 0833)    Pulse (P) 99 (09/08/20 0833)   Resp (P) 18 (09/08/20 0833)    SpO2 (P) 96 % (09/08/20 0833)

## 2020-09-08 NOTE — DISCHARGE INSTRUCTIONS
Keep dressings clean and dry for 2- 3 days  May remove dressings in three days  May shower if incisions dry  Please clean incisions with alcohol after showers  Weight bearing as tolerated  Apply band-aide if needed  Utilize naproxen or Aleve as your primary source of pain relief use the pain medicines prescribed if needed and sparingly

## 2020-09-21 ENCOUNTER — OFFICE VISIT (OUTPATIENT)
Dept: OBGYN CLINIC | Facility: CLINIC | Age: 35
End: 2020-09-21

## 2020-09-21 ENCOUNTER — TELEPHONE (OUTPATIENT)
Dept: OBGYN CLINIC | Facility: HOSPITAL | Age: 35
End: 2020-09-21

## 2020-09-21 VITALS — BODY MASS INDEX: 39.17 KG/M2 | HEIGHT: 75 IN | WEIGHT: 315 LBS

## 2020-09-21 DIAGNOSIS — S83.282D ACUTE LATERAL MENISCUS TEAR OF LEFT KNEE, SUBSEQUENT ENCOUNTER: Primary | ICD-10-CM

## 2020-09-21 PROCEDURE — 99024 POSTOP FOLLOW-UP VISIT: CPT | Performed by: ORTHOPAEDIC SURGERY

## 2020-09-21 NOTE — PROGRESS NOTES
Assessment:  1  Acute lateral meniscus tear of left knee, subsequent encounter       Patient Active Problem List   Diagnosis    Patellar tendon rupture, left, initial encounter    Acute lateral meniscus tear of left knee    Patellar tendinosis    Morbid obesity with BMI of 45 0-49 9, adult ([facility] [address] )    Tobacco use           Plan      · The patient is instructed to do flexion extension and quad set exercises on daily basis  · Patient to massage scar in one week  · Images from surgery reviewed with patient  · Patient to use ice as needed   · Follow up in 2 weeks            Subjective:     Patient ID:    Chief Complaint:Danial Rodrigues 29 y o  male      HPI    Patient presents 2 weeks s/p left partial lateral menisectomy, 9/8/2020  He is improving  Today he complains of left knee generalized soreness  The left knee has decreased in swelling  He rates his pain at 5/10  Deep flexion aggravates while rest alleviates  He does use otc Naproxen  He does walk for exercise             The following portions of the patient's history were reviewed and updated as appropriate: allergies, current medications, past family history, past social history, past surgical history and problem list         Social History     Socioeconomic History    Marital status: Single     Spouse name: Not on file    Number of children: Not on file    Years of education: Not on file    Highest education level: Not on file   Occupational History    Not on file   Social Needs    Financial resource strain: Not on file    Food insecurity     Worry: Not on file     Inability: Not on file    Transportation needs     Medical: Not on file     Non-medical: Not on file   Tobacco Use    Smoking status: Current Every Day Smoker     Packs/day: 0 25     Types: Cigarettes    Smokeless tobacco: Never Used   Substance and Sexual Activity    Alcohol use: Not Currently    Drug use: Never    Sexual activity: Not on file   Lifestyle    Physical activity Days per week: Not on file     Minutes per session: Not on file    Stress: Not on file   Relationships    Social connections     Talks on phone: Not on file     Gets together: Not on file     Attends Evangelical service: Not on file     Active member of club or organization: Not on file     Attends meetings of clubs or organizations: Not on file     Relationship status: Not on file    Intimate partner violence     Fear of current or ex partner: Not on file     Emotionally abused: Not on file     Physically abused: Not on file     Forced sexual activity: Not on file   Other Topics Concern    Not on file   Social History Narrative    Not on file     No past medical history on file  Past Surgical History:   Procedure Laterality Date    MOUTH SURGERY      tongue    NO PAST SURGERIES      [place] KNEE SCOPE,MED/LAT MENISECTOMY Left 9/8/2020    Procedure: KNEE ARTHROSCOPY PARTIAL LATERAL MENISCECTOMY;  Surgeon: Kelly Chau DO;  [facility];  Service: Orthopedics     No Known Allergies  Current Outpatient Medications on File Prior to Visit   Medication Sig Dispense Refill    metFORMIN (GLUCOPHAGE) 500 mg tablet        No current facility-administered medications on file prior to visit  Objective:    Review of Systems   Constitutional: Negative for chills, fever and unexpected weight change  HENT: Negative for hearing loss, nosebleeds and sore throat  Eyes: Negative for pain, redness and visual disturbance  Respiratory: Negative for cough, shortness of breath and wheezing  Cardiovascular: Negative for chest pain, palpitations and leg swelling  Gastrointestinal: Negative for abdominal pain, nausea and vomiting  Endocrine: Negative for polydipsia and polyuria  Genitourinary: Negative for difficulty urinating and hematuria  Skin: Negative for rash and wound  Psychiatric/Behavioral: Negative for decreased concentration and suicidal ideas  The patient is not nervous/anxious  Left Knee Exam     Other   Erythema: absent  Scars: absent  Sensation: normal  Swelling: none    Comments:  Medial and lateral arthroscopic portals clean dry and intact  Sutures well approximated and removed today            Physical Exam  Constitutional:       Appearance: He is well-developed  HENT:      Right Ear: External ear normal       Left Ear: External ear normal       Nose: Nose normal    Eyes:      Conjunctiva/sclera: Conjunctivae normal    Neck:      Musculoskeletal: Normal range of motion  Pulmonary:      Effort: Pulmonary effort is normal    Skin:     General: Skin is warm and dry  Neurological:      Mental Status: He is alert and oriented to person, place, and time  Psychiatric:         Behavior: Behavior normal          Thought Content: Thought content normal          Judgment: Judgment normal          Procedures    None performed today    I have personally reviewed pertinent films in PACS  Scribe Attestation    I,:   Garth Costa am acting as a scribe while in the presence of the attending physician :        I,:   Dorie Bejarano, DO personally performed the services described in this documentation    as scribed in my presence :              Portions of the record may have been created with voice recognition software   Occasional wrong word or "sound a like" substitutions may have occurred due to the inherent limitations of voice recognition software   Read the chart carefully and recognize, using context, where substitutions have occurred

## 2020-09-21 NOTE — TELEPHONE ENCOUNTER
Dr Caitlin Arroyo    [phone]    Patient is requesting a work letter    Please fax to [phone]

## 2020-10-05 ENCOUNTER — OFFICE VISIT (OUTPATIENT)
Dept: OBGYN CLINIC | Facility: CLINIC | Age: 35
End: 2020-10-05

## 2020-10-05 VITALS
WEIGHT: 315 LBS | BODY MASS INDEX: 39.17 KG/M2 | DIASTOLIC BLOOD PRESSURE: 93 MMHG | HEIGHT: 75 IN | HEART RATE: 102 BPM | SYSTOLIC BLOOD PRESSURE: 135 MMHG

## 2020-10-05 DIAGNOSIS — S83.282D ACUTE LATERAL MENISCUS TEAR OF LEFT KNEE, SUBSEQUENT ENCOUNTER: Primary | ICD-10-CM

## 2020-10-05 DIAGNOSIS — Z98.890 S/P ARTHROSCOPY OF LEFT KNEE: ICD-10-CM

## 2020-10-05 PROCEDURE — 99024 POSTOP FOLLOW-UP VISIT: CPT | Performed by: ORTHOPAEDIC SURGERY

## 2020-10-07 ENCOUNTER — TELEPHONE (OUTPATIENT)
Dept: OBGYN CLINIC | Facility: HOSPITAL | Age: 35
End: 2020-10-07

## 2020-11-04 ENCOUNTER — OFFICE VISIT (OUTPATIENT)
Dept: OBGYN CLINIC | Facility: CLINIC | Age: 35
End: 2020-11-04

## 2020-11-04 VITALS
SYSTOLIC BLOOD PRESSURE: 138 MMHG | HEIGHT: 75 IN | BODY MASS INDEX: 39.17 KG/M2 | DIASTOLIC BLOOD PRESSURE: 96 MMHG | HEART RATE: 96 BPM | WEIGHT: 315 LBS

## 2020-11-04 DIAGNOSIS — M22.2X2 PATELLOFEMORAL PAIN SYNDROME OF LEFT KNEE: Primary | ICD-10-CM

## 2020-11-04 PROCEDURE — 99024 POSTOP FOLLOW-UP VISIT: CPT | Performed by: ORTHOPAEDIC SURGERY

## 2020-12-02 ENCOUNTER — OFFICE VISIT (OUTPATIENT)
Dept: OBGYN CLINIC | Facility: CLINIC | Age: 35
End: 2020-12-02

## 2020-12-02 VITALS — BODY MASS INDEX: 39.17 KG/M2 | HEIGHT: 75 IN | WEIGHT: 315 LBS

## 2020-12-02 DIAGNOSIS — M76.50 PATELLAR TENDINOSIS: ICD-10-CM

## 2020-12-02 DIAGNOSIS — M17.12 PATELLOFEMORAL ARTHRITIS OF LEFT KNEE: ICD-10-CM

## 2020-12-02 DIAGNOSIS — Z98.890 HISTORY OF MENISCECTOMY OF LEFT KNEE: ICD-10-CM

## 2020-12-02 DIAGNOSIS — M23.92 INTERNAL DERANGEMENT OF KNEE JOINT, LEFT: Primary | ICD-10-CM

## 2020-12-02 DIAGNOSIS — M22.2X2 PATELLOFEMORAL PAIN SYNDROME OF LEFT KNEE: ICD-10-CM

## 2020-12-02 PROCEDURE — 99024 POSTOP FOLLOW-UP VISIT: CPT | Performed by: ORTHOPAEDIC SURGERY

## 2020-12-04 ENCOUNTER — TELEPHONE (OUTPATIENT)
Dept: OBGYN CLINIC | Facility: HOSPITAL | Age: 35
End: 2020-12-04

## 2020-12-11 ENCOUNTER — TELEPHONE (OUTPATIENT)
Dept: OBGYN CLINIC | Facility: HOSPITAL | Age: 35
End: 2020-12-11

## 2020-12-11 ENCOUNTER — TELEPHONE (OUTPATIENT)
Dept: OBGYN CLINIC | Facility: CLINIC | Age: 35
End: 2020-12-11

## 2020-12-14 NOTE — TELEPHONE ENCOUNTER
Called patient and lvm to call us to schedule an apt for MRI review and if he can please bring the CD with the images

## 2020-12-18 ENCOUNTER — DOCUMENTATION (OUTPATIENT)
Dept: OBGYN CLINIC | Facility: HOSPITAL | Age: 35
End: 2020-12-18

## 2020-12-24 ENCOUNTER — OFFICE VISIT (OUTPATIENT)
Dept: OBGYN CLINIC | Facility: CLINIC | Age: 35
End: 2020-12-24
Payer: OTHER MISCELLANEOUS

## 2020-12-24 VITALS
WEIGHT: 315 LBS | DIASTOLIC BLOOD PRESSURE: 103 MMHG | SYSTOLIC BLOOD PRESSURE: 152 MMHG | BODY MASS INDEX: 39.17 KG/M2 | HEART RATE: 101 BPM | HEIGHT: 75 IN

## 2020-12-24 DIAGNOSIS — M17.12 PRIMARY OSTEOARTHRITIS OF LEFT KNEE: ICD-10-CM

## 2020-12-24 DIAGNOSIS — Z98.890 S/P ARTHROSCOPY OF LEFT KNEE: ICD-10-CM

## 2020-12-24 DIAGNOSIS — M17.12 PATELLOFEMORAL ARTHRITIS OF LEFT KNEE: Primary | ICD-10-CM

## 2020-12-24 DIAGNOSIS — M22.2X2 PATELLOFEMORAL PAIN SYNDROME OF LEFT KNEE: ICD-10-CM

## 2020-12-24 PROCEDURE — 20610 DRAIN/INJ JOINT/BURSA W/O US: CPT | Performed by: PHYSICIAN ASSISTANT

## 2020-12-24 PROCEDURE — 99213 OFFICE O/P EST LOW 20 MIN: CPT | Performed by: PHYSICIAN ASSISTANT

## 2020-12-24 RX ORDER — HYALURONATE SODIUM 10 MG/ML
20 SYRINGE (ML) INTRAARTICULAR
Status: COMPLETED | OUTPATIENT
Start: 2020-12-24 | End: 2020-12-24

## 2020-12-24 RX ADMIN — Medication 20 MG: at 09:08

## 2020-12-30 ENCOUNTER — OFFICE VISIT (OUTPATIENT)
Dept: OBGYN CLINIC | Facility: CLINIC | Age: 35
End: 2020-12-30
Payer: OTHER MISCELLANEOUS

## 2020-12-30 DIAGNOSIS — M22.2X2 PATELLOFEMORAL PAIN SYNDROME OF LEFT KNEE: ICD-10-CM

## 2020-12-30 DIAGNOSIS — M17.12 PRIMARY OSTEOARTHRITIS OF LEFT KNEE: Primary | ICD-10-CM

## 2020-12-30 PROCEDURE — 20610 DRAIN/INJ JOINT/BURSA W/O US: CPT | Performed by: ORTHOPAEDIC SURGERY

## 2020-12-30 RX ORDER — MELOXICAM 15 MG/1
15 TABLET ORAL DAILY
Qty: 30 TABLET | Refills: 1 | Status: SHIPPED | OUTPATIENT
Start: 2020-12-30 | End: 2021-01-06

## 2020-12-30 RX ORDER — HYALURONATE SODIUM 10 MG/ML
20 SYRINGE (ML) INTRAARTICULAR
Status: COMPLETED | OUTPATIENT
Start: 2020-12-30 | End: 2020-12-30

## 2020-12-30 RX ADMIN — Medication 20 MG: at 14:55

## 2021-01-06 ENCOUNTER — OFFICE VISIT (OUTPATIENT)
Dept: OBGYN CLINIC | Facility: CLINIC | Age: 36
End: 2021-01-06
Payer: OTHER MISCELLANEOUS

## 2021-01-06 VITALS — HEIGHT: 75 IN | BODY MASS INDEX: 39.17 KG/M2 | WEIGHT: 315 LBS

## 2021-01-06 DIAGNOSIS — M17.12 PRIMARY OSTEOARTHRITIS OF LEFT KNEE: Primary | ICD-10-CM

## 2021-01-06 PROCEDURE — 20610 DRAIN/INJ JOINT/BURSA W/O US: CPT | Performed by: ORTHOPAEDIC SURGERY

## 2021-01-06 RX ORDER — HYALURONATE SODIUM 10 MG/ML
20 SYRINGE (ML) INTRAARTICULAR
Status: COMPLETED | OUTPATIENT
Start: 2021-01-06 | End: 2021-01-06

## 2021-01-06 RX ADMIN — Medication 20 MG: at 10:44

## 2021-01-06 NOTE — PROGRESS NOTES
1  Primary osteoarthritis of left knee  Large joint arthrocentesis     Patient is here for his Third injection of  Euflexxa into the left knee  Patient reports knee pain unimproved since 1st 2 injections still has buckling at times in the knee  Pain in the lateral joint line           Physical exam of the knee shows no effusion no ecchymosis  All other organ systems normal    Large joint arthrocentesis: L knee  Universal Protocol:  Consent given by: patient  Time out: Immediately prior to procedure a "time out" was called to verify the correct patient, procedure, equipment, support staff and site/side marked as required    Site marked: the operative site was marked  Supporting Documentation  Indications: pain   Procedure Details  Location: knee - L knee  Preparation: Patient was prepped and draped in the usual sterile fashion  Needle size: 22 G  Ultrasound guidance: no  Approach: anterolateral  Medications administered: 20 mg Sodium Hyaluronate 20 MG/2ML    Patient tolerance: patient tolerated the procedure well with no immediate complications  Dressing:  Sterile dressing applied          Patient tolerated procedure follow up   Four weeks   continue with Echavarria taping home exercise program   discontinue meloxicam, continue naproxen

## 2021-01-06 NOTE — LETTER
January 6, 2021     Patient: Dixie Mcdonald   YOB: 1985   Date of Visit: 1/6/2021       To Whom it May Concern:    Dixie Mcdonald is under my professional care  He was seen in my office on 1/6/2021  He may return to work on 1/7/21 without restrictions  If you have any questions or concerns, please don't hesitate to call           Sincerely,          Ree Ochoa DO        CC: No Recipients

## 2021-01-11 ENCOUNTER — TELEPHONE (OUTPATIENT)
Dept: OBGYN CLINIC | Facility: HOSPITAL | Age: 36
End: 2021-01-11

## 2021-01-11 DIAGNOSIS — M22.2X2 PATELLOFEMORAL PAIN SYNDROME OF LEFT KNEE: Primary | ICD-10-CM

## 2021-01-11 DIAGNOSIS — Z98.890 S/P ARTHROSCOPY OF LEFT KNEE: ICD-10-CM

## 2021-01-11 NOTE — TELEPHONE ENCOUNTER
DR Rome Bonilla  RE: L knee pain   # [phone]    Patient asked to speak to Dr Oliva BISHOP regarding L knee pain/buckling

## 2021-01-13 ENCOUNTER — OFFICE VISIT (OUTPATIENT)
Dept: OBGYN CLINIC | Facility: CLINIC | Age: 36
End: 2021-01-13
Payer: OTHER MISCELLANEOUS

## 2021-01-13 ENCOUNTER — APPOINTMENT (OUTPATIENT)
Dept: RADIOLOGY | Facility: AMBULARY SURGERY CENTER | Age: 36
End: 2021-01-13
Attending: ORTHOPAEDIC SURGERY
Payer: OTHER MISCELLANEOUS

## 2021-01-13 VITALS
SYSTOLIC BLOOD PRESSURE: 148 MMHG | HEART RATE: 106 BPM | DIASTOLIC BLOOD PRESSURE: 93 MMHG | BODY MASS INDEX: 39.17 KG/M2 | WEIGHT: 315 LBS | HEIGHT: 75 IN

## 2021-01-13 DIAGNOSIS — S80.01XA CONTUSION OF RIGHT KNEE, INITIAL ENCOUNTER: ICD-10-CM

## 2021-01-13 DIAGNOSIS — M22.2X2 PATELLOFEMORAL DISORDER OF BOTH KNEES: Primary | ICD-10-CM

## 2021-01-13 DIAGNOSIS — M25.562 PAIN IN BOTH KNEES, UNSPECIFIED CHRONICITY: ICD-10-CM

## 2021-01-13 DIAGNOSIS — M25.561 PAIN IN BOTH KNEES, UNSPECIFIED CHRONICITY: ICD-10-CM

## 2021-01-13 DIAGNOSIS — Q68.2 PATELLA ALTA: ICD-10-CM

## 2021-01-13 DIAGNOSIS — M22.2X1 PATELLOFEMORAL DISORDER OF BOTH KNEES: Primary | ICD-10-CM

## 2021-01-13 PROCEDURE — 99214 OFFICE O/P EST MOD 30 MIN: CPT | Performed by: ORTHOPAEDIC SURGERY

## 2021-01-13 PROCEDURE — 73562 X-RAY EXAM OF KNEE 3: CPT

## 2021-01-13 NOTE — LETTER
January 13, 2021     Patient: Katherine Massey   YOB: 1985   Date of Visit: 1/13/2021       To Whom it May Concern:    Katherine Massey is under my professional care  He was seen in my office on 1/13/2021  He may return to work with limitations : may return on 1/14/21 with no squatting or kneeling if possible, restricitons in place until he is seen by occupational medicine       If you have any questions or concerns, please don't hesitate to call           Sincerely,          Jean Goode DO        CC: No Recipients

## 2021-01-13 NOTE — PROGRESS NOTES
Assessment/Plan:  1  Patellofemoral disorder of both knees  Brace    Ambulatory referral to Physical Therapy   2  Pain in both knees, unspecified chronicity  XR knee 3 vw right non injury    XR knee 3 vw left non injury   3  Patella wanda  Brace    Ambulatory referral to Physical Therapy   4  Contusion of right knee, initial encounter       Patient Active Problem List   Diagnosis    Patellar tendon rupture, left, initial encounter    Acute lateral meniscus tear of left knee    Patellar tendinosis    Morbid obesity with BMI of 45 0-49 9, adult ([facility] [address] )    Tobacco use    S/P arthroscopy of left knee    Patellofemoral pain syndrome of left knee    Internal derangement of knee joint, left    History of meniscectomy of left knee    Patellofemoral arthritis of left knee    Primary osteoarthritis of left knee    Patellofemoral disorder of both knees    Patella wanda    Contusion of right knee       Discussion/Summary:    28 y o  male with bilateral knee pain and patellofemoral disorder, right currently worse than left, with mild bone contusion s/p fall at work onto knees 2 days ago  It is recommended he avoid squatting or kneeling at work however if this prevents him from working he is not absolutely restricted from doing so  Recommended PT and garcias taping for bilateral knees, may consider brace for left knee if garcias tape seems ineffective  If he does not get better with PT , recommend considering FCE, however will defer that decision to occupational medicine, and referral to [facility] med was provided today  If he had any relief with visco we can repeat those in the future  The patient was seen and examined by Dr Lavell Chau and myself  The assessment and plan were formulated by Dr Lavell Chau and I assisted in carrying it out  Subjective:   Patient ID: Gillian Bauman is a 28 y o  male   HPI    Patient presents to the office complaining of  Bilateral knee pain    Symptoms began  2 days ago while at work he was in his truck was walking felt the left knee buckle and then fell onto the bilateral knees and elbows  He has been treated previously for the left knee had lateral meniscectomy done September 8, 2020 PRP injection for partial patellar tendon tear last summer    Pain is located   Anterior knees around the patellae  Pain is described as  constant, aching  The pain bowman snot radiate   The pain is moderate   It is made worse by stairs  It is made better by rest  So far the patient has tried no treatments since tuesday  The patient has had past episodes similar to this in left knee which he has been treated for with garcias taping, PT and visco injections  The patient denies any numbness or tingling      The following portions of the patient's history were reviewed and updated as appropriate: allergies, current medications, past family history, past social history, past surgical history and problem list     Social History     Socioeconomic History    Marital status: Single     Spouse name: Not on file    Number of children: Not on file    Years of education: Not on file    Highest education level: Not on file   Occupational History    Not on file   Social Needs    Financial resource strain: Not on file    Food insecurity     Worry: Not on file     Inability: Not on file    Transportation needs     Medical: Not on file     Non-medical: Not on file   Tobacco Use    Smoking status: Current Every Day Smoker     Packs/day: 0 25     Types: Cigarettes    Smokeless tobacco: Never Used   Substance and Sexual Activity    Alcohol use: Not Currently    Drug use: Never    Sexual activity: Not on file   Lifestyle    Physical activity     Days per week: Not on file     Minutes per session: Not on file    Stress: Not on file   Relationships    Social connections     Talks on phone: Not on file     Gets together: Not on file     Attends Latter-day service: Not on file     Active member of club or organization: Not on file     Attends meetings of clubs or organizations: Not on file     Relationship status: Not on file    Intimate partner violence     Fear of current or ex partner: Not on file     Emotionally abused: Not on file     Physically abused: Not on file     Forced sexual activity: Not on file   Other Topics Concern    Not on file   Social History Narrative    Not on file     History reviewed  No pertinent past medical history  Past Surgical History:   Procedure Laterality Date    MOUTH SURGERY      tongue    NO PAST SURGERIES      [place] KNEE SCOPE,MED/LAT MENISECTOMY Left 9/8/2020    Procedure: KNEE ARTHROSCOPY PARTIAL LATERAL MENISCECTOMY;  Surgeon: Aubree Pastor DO;  [facility];  Service: Orthopedics     No Known Allergies  Current Outpatient Medications on File Prior to Visit   Medication Sig Dispense Refill    metFORMIN (GLUCOPHAGE) 500 mg tablet        No current facility-administered medications on file prior to visit  Review of Systems   Constitutional: Negative for chills, fever and unexpected weight change  HENT: Negative for hearing loss, nosebleeds and sore throat  Eyes: Negative for pain, redness and visual disturbance  Respiratory: Negative for cough, shortness of breath and wheezing  Cardiovascular: Negative for chest pain, palpitations and leg swelling  Gastrointestinal: Negative for abdominal pain, nausea and vomiting  Endocrine: Negative for polydipsia and polyuria  Genitourinary: Negative for dysuria and hematuria  Musculoskeletal:          As noted in HPI   Skin: Negative for rash and wound  Neurological: Negative for dizziness, numbness and headaches  Psychiatric/Behavioral: Negative for decreased concentration, dysphoric mood and suicidal ideas  The patient is not nervous/anxious  Objective:    Vitals:    01/13/21 1023   BP: 148/93   Pulse: (!) 106       Physical Exam  Constitutional:       General: He is not in acute distress       Appearance: He is well-developed  HENT:      Head: Normocephalic and atraumatic  Eyes:      General: No scleral icterus  Conjunctiva/sclera: Conjunctivae normal    Neck:      Musculoskeletal: Neck supple  Trachea: No tracheal deviation  Cardiovascular:      Comments: No discernible arrhthymias   Pulmonary:      Effort: Pulmonary effort is normal  No respiratory distress  Musculoskeletal:      Right knee: He exhibits no effusion  Left knee: He exhibits no effusion  Skin:     General: Skin is warm and dry  Neurological:      Mental Status: He is alert  Psychiatric:         Behavior: Behavior normal          Right Knee Exam     Muscle Strength   The patient has normal right knee strength  Tenderness   The patient is experiencing tenderness in the medial retinaculum, patella, medial joint line and patellar tendon (MPFL tenderness)  Range of Motion   The patient has normal right knee ROM  Extension: normal   Flexion: normal     Tests   Cee:  Medial - negative Lateral - negative  Varus: negative Valgus: negative  Lachman:  Anterior - negative      Drawer:  Posterior - negative  Patellar apprehension: negative    Other   Erythema: absent  Scars: absent  Sensation: normal  Pulse: present  Swelling: none  Effusion: no effusion present    Comments:  No ecchymosis or deformity  positive patellar grind test  Audible and palpable crepitus in right knee  Patellar and quadriceps tendon intact      Left Knee Exam     Muscle Strength   The patient has normal left knee strength  Tenderness   The patient is experiencing tenderness in the medial retinaculum  Range of Motion   The patient has normal left knee ROM    Extension: normal   Flexion: normal     Tests   Cee:  Medial - negative Lateral - negative  Varus: negative Valgus: negative  Lachman:  Anterior - negative      Drawer:  Posterior - negative  Patellar apprehension: negative    Other   Erythema: absent  Scars: absent  Sensation: normal  Pulse: present  Swelling: none  Effusion: no effusion present    Comments:  Skin intact, small area of  ecchymosis over medial femoral condyle less than 1 cm, no deformity  positive patellar grind test  Palpable crepitus behind patella            I have personally reviewed pertinent films in PACS and my interpretation is XRs of bilateral knees demonstrate no acute fracture, lateral patella tilting right than left, patella wanda right worse than left  no significant degenerative changes    Procedures  No Procedures performed today    Portions of the record may have been created with voice recognition software  Occasional wrong word or "sound a like" substitutions may have occurred due to the inherent limitations of voice recognition software  Read the chart carefully and recognize, using context, where substitutions have occurred

## 2021-02-25 ENCOUNTER — APPOINTMENT (OUTPATIENT)
Dept: URGENT CARE | Age: 36
End: 2021-02-25
Payer: OTHER MISCELLANEOUS

## 2021-02-25 PROCEDURE — 99213 OFFICE O/P EST LOW 20 MIN: CPT | Performed by: PREVENTIVE MEDICINE

## 2021-03-11 ENCOUNTER — APPOINTMENT (OUTPATIENT)
Dept: URGENT CARE | Age: 36
End: 2021-03-11
Payer: OTHER MISCELLANEOUS

## 2021-03-11 PROCEDURE — 99213 OFFICE O/P EST LOW 20 MIN: CPT | Performed by: PREVENTIVE MEDICINE

## 2021-04-01 ENCOUNTER — APPOINTMENT (OUTPATIENT)
Dept: URGENT CARE | Age: 36
End: 2021-04-01
Payer: OTHER MISCELLANEOUS

## 2021-04-01 PROCEDURE — 99213 OFFICE O/P EST LOW 20 MIN: CPT | Performed by: PREVENTIVE MEDICINE

## 2021-10-07 ENCOUNTER — APPOINTMENT (OUTPATIENT)
Dept: LAB | Facility: CLINIC | Age: 36
End: 2021-10-07
Payer: COMMERCIAL

## 2021-10-07 DIAGNOSIS — M23.204 OLD COMPLEX TEAR OF MEDIAL MENISCUS OF LEFT KNEE: ICD-10-CM

## 2021-10-07 DIAGNOSIS — M22.42 CHONDROMALACIA OF LEFT PATELLA: ICD-10-CM

## 2021-10-07 DIAGNOSIS — S83.272S COMPLEX TEAR OF LATERAL MENISCUS OF LEFT KNEE AS CURRENT INJURY, SEQUELA: ICD-10-CM

## 2021-10-07 DIAGNOSIS — Z01.812 PRE-OPERATIVE LABORATORY EXAMINATION: ICD-10-CM

## 2021-10-07 LAB
ANION GAP SERPL CALCULATED.3IONS-SCNC: 11 MMOL/L (ref 4–13)
BUN SERPL-MCNC: 12 MG/DL (ref 5–25)
CALCIUM SERPL-MCNC: 8.4 MG/DL (ref 8.3–10.1)
CHLORIDE SERPL-SCNC: 105 MMOL/L (ref 100–108)
CO2 SERPL-SCNC: 26 MMOL/L (ref 21–32)
CREAT SERPL-MCNC: 0.93 MG/DL (ref 0.6–1.3)
ERYTHROCYTE [DISTWIDTH] IN BLOOD BY AUTOMATED COUNT: 12.6 % (ref 11.6–15.1)
GFR SERPL CREATININE-BSD FRML MDRD: 106 ML/MIN/1.73SQ M
GLUCOSE P FAST SERPL-MCNC: 152 MG/DL (ref 65–99)
HCT VFR BLD AUTO: 44.7 % (ref 36.5–49.3)
HGB BLD-MCNC: 15.3 G/DL (ref 12–17)
INR PPP: 0.98 (ref 0.84–1.19)
MCH RBC QN AUTO: 31.2 PG (ref 26.8–34.3)
MCHC RBC AUTO-ENTMCNC: 34.2 G/DL (ref 31.4–37.4)
MCV RBC AUTO: 91 FL (ref 82–98)
PLATELET # BLD AUTO: 196 THOUSANDS/UL (ref 149–390)
PMV BLD AUTO: 11.1 FL (ref 8.9–12.7)
POTASSIUM SERPL-SCNC: 3.8 MMOL/L (ref 3.5–5.3)
PROTHROMBIN TIME: 13 SECONDS (ref 11.6–14.5)
RBC # BLD AUTO: 4.91 MILLION/UL (ref 3.88–5.62)
SODIUM SERPL-SCNC: 142 MMOL/L (ref 136–145)
WBC # BLD AUTO: 6.61 THOUSAND/UL (ref 4.31–10.16)

## 2021-10-07 PROCEDURE — 80048 BASIC METABOLIC PNL TOTAL CA: CPT

## 2021-10-07 PROCEDURE — 36415 COLL VENOUS BLD VENIPUNCTURE: CPT

## 2021-10-07 PROCEDURE — 85610 PROTHROMBIN TIME: CPT

## 2021-10-07 PROCEDURE — 85027 COMPLETE CBC AUTOMATED: CPT

## 2023-03-23 ENCOUNTER — APPOINTMENT (EMERGENCY)
Dept: ULTRASOUND IMAGING | Facility: HOSPITAL | Age: 38
End: 2023-03-23

## 2023-03-23 ENCOUNTER — HOSPITAL ENCOUNTER (EMERGENCY)
Facility: HOSPITAL | Age: 38
Discharge: HOME | End: 2023-03-23
Attending: EMERGENCY MEDICINE

## 2023-03-23 VITALS
TEMPERATURE: 97.7 F | HEART RATE: 95 BPM | DIASTOLIC BLOOD PRESSURE: 86 MMHG | OXYGEN SATURATION: 98 % | WEIGHT: 285.5 LBS | RESPIRATION RATE: 16 BRPM | SYSTOLIC BLOOD PRESSURE: 150 MMHG | BODY MASS INDEX: 35.68 KG/M2

## 2023-03-23 DIAGNOSIS — I10 PRIMARY HYPERTENSION: ICD-10-CM

## 2023-03-23 DIAGNOSIS — E11.65 HYPERGLYCEMIA DUE TO TYPE 2 DIABETES MELLITUS (HCC): ICD-10-CM

## 2023-03-23 DIAGNOSIS — R10.10 UPPER ABDOMINAL PAIN: Primary | ICD-10-CM

## 2023-03-23 LAB
ALBUMIN SERPL BCP-MCNC: 4.1 G/DL (ref 3.5–5)
ALP SERPL-CCNC: 69 U/L (ref 34–104)
ALT SERPL W P-5'-P-CCNC: 24 U/L (ref 7–52)
ANION GAP SERPL CALCULATED.3IONS-SCNC: 11 MMOL/L (ref 4–13)
AST SERPL W P-5'-P-CCNC: 12 U/L (ref 13–39)
ATRIAL RATE: 91 BPM
ATRIAL RATE: 98 BPM
BACTERIA UR QL AUTO: NORMAL /HPF
BASE EX.OXY STD BLDV CALC-SCNC: 77.3 % (ref 60–80)
BASE EXCESS BLDV CALC-SCNC: -0.2 MMOL/L
BASOPHILS # BLD AUTO: 0.03 THOUSANDS/ÂΜL (ref 0–0.1)
BASOPHILS NFR BLD AUTO: 0 % (ref 0–1)
BILIRUB SERPL-MCNC: 0.85 MG/DL (ref 0.2–1)
BILIRUB UR QL STRIP: NEGATIVE
BUN SERPL-MCNC: 11 MG/DL (ref 5–25)
CALCIUM SERPL-MCNC: 9.2 MG/DL (ref 8.4–10.2)
CHLORIDE SERPL-SCNC: 102 MMOL/L (ref 96–108)
CLARITY UR: CLEAR
CO2 SERPL-SCNC: 22 MMOL/L (ref 21–32)
COLOR UR: ABNORMAL
CREAT SERPL-MCNC: 0.7 MG/DL (ref 0.6–1.3)
EOSINOPHIL # BLD AUTO: 0.31 THOUSAND/ÂΜL (ref 0–0.61)
EOSINOPHIL NFR BLD AUTO: 3 % (ref 0–6)
ERYTHROCYTE [DISTWIDTH] IN BLOOD BY AUTOMATED COUNT: 11.7 % (ref 11.6–15.1)
EST. AVERAGE GLUCOSE BLD GHB EST-MCNC: 258 MG/DL
GFR SERPL CREATININE-BSD FRML MDRD: 120 ML/MIN/1.73SQ M
GLUCOSE SERPL-MCNC: 209 MG/DL (ref 65–140)
GLUCOSE SERPL-MCNC: 213 MG/DL (ref 65–140)
GLUCOSE SERPL-MCNC: 304 MG/DL (ref 65–140)
GLUCOSE SERPL-MCNC: 371 MG/DL (ref 65–140)
GLUCOSE UR STRIP-MCNC: ABNORMAL MG/DL
HBA1C MFR BLD: 10.6 %
HCO3 BLDV-SCNC: 25.2 MMOL/L (ref 24–30)
HCT VFR BLD AUTO: 47.7 % (ref 36.5–49.3)
HGB BLD-MCNC: 16.9 G/DL (ref 12–17)
HGB UR QL STRIP.AUTO: NEGATIVE
IMM GRANULOCYTES # BLD AUTO: 0.05 THOUSAND/UL (ref 0–0.2)
IMM GRANULOCYTES NFR BLD AUTO: 1 % (ref 0–2)
KETONES UR STRIP-MCNC: ABNORMAL MG/DL
LEUKOCYTE ESTERASE UR QL STRIP: ABNORMAL
LIPASE SERPL-CCNC: 103 U/L (ref 11–82)
LYMPHOCYTES # BLD AUTO: 1.47 THOUSANDS/ÂΜL (ref 0.6–4.47)
LYMPHOCYTES NFR BLD AUTO: 14 % (ref 14–44)
MCH RBC QN AUTO: 30.6 PG (ref 26.8–34.3)
MCHC RBC AUTO-ENTMCNC: 35.4 G/DL (ref 31.4–37.4)
MCV RBC AUTO: 86 FL (ref 82–98)
MONOCYTES # BLD AUTO: 0.45 THOUSAND/ÂΜL (ref 0.17–1.22)
MONOCYTES NFR BLD AUTO: 4 % (ref 4–12)
NEUTROPHILS # BLD AUTO: 8.02 THOUSANDS/ÂΜL (ref 1.85–7.62)
NEUTS SEG NFR BLD AUTO: 78 % (ref 43–75)
NITRITE UR QL STRIP: NEGATIVE
NON-SQ EPI CELLS URNS QL MICRO: NORMAL /HPF
NRBC BLD AUTO-RTO: 0 /100 WBCS
O2 CT BLDV-SCNC: 19.3 ML/DL
P AXIS: 20 DEGREES
P AXIS: 51 DEGREES
PCO2 BLDV: 43.6 MM HG (ref 42–50)
PH BLDV: 7.38 [PH] (ref 7.3–7.4)
PH UR STRIP.AUTO: 5.5 [PH]
PLATELET # BLD AUTO: 232 THOUSANDS/UL (ref 149–390)
PMV BLD AUTO: 11.2 FL (ref 8.9–12.7)
PO2 BLDV: 41.6 MM HG (ref 35–45)
POTASSIUM SERPL-SCNC: 4.2 MMOL/L (ref 3.5–5.3)
PR INTERVAL: 156 MS
PR INTERVAL: 158 MS
PROT SERPL-MCNC: 7.5 G/DL (ref 6.4–8.4)
PROT UR STRIP-MCNC: NEGATIVE MG/DL
QRS AXIS: -13 DEGREES
QRS AXIS: -17 DEGREES
QRSD INTERVAL: 102 MS
QRSD INTERVAL: 94 MS
QT INTERVAL: 342 MS
QT INTERVAL: 354 MS
QTC INTERVAL: 435 MS
QTC INTERVAL: 436 MS
RBC # BLD AUTO: 5.52 MILLION/UL (ref 3.88–5.62)
RBC #/AREA URNS AUTO: NORMAL /HPF
SODIUM SERPL-SCNC: 135 MMOL/L (ref 135–147)
SP GR UR STRIP.AUTO: 1.05 (ref 1–1.03)
T WAVE AXIS: 2 DEGREES
T WAVE AXIS: 20 DEGREES
UROBILINOGEN UR STRIP-ACNC: <2 MG/DL
VENTRICULAR RATE: 91 BPM
VENTRICULAR RATE: 98 BPM
WBC # BLD AUTO: 10.33 THOUSAND/UL (ref 4.31–10.16)
WBC #/AREA URNS AUTO: NORMAL /HPF

## 2023-03-23 RX ORDER — ONDANSETRON 4 MG/1
8 TABLET, ORALLY DISINTEGRATING ORAL ONCE
Status: COMPLETED | OUTPATIENT
Start: 2023-03-23 | End: 2023-03-23

## 2023-03-23 RX ORDER — SODIUM CHLORIDE, SODIUM LACTATE, POTASSIUM CHLORIDE, CALCIUM CHLORIDE 600; 310; 30; 20 MG/100ML; MG/100ML; MG/100ML; MG/100ML
1000 INJECTION, SOLUTION INTRAVENOUS CONTINUOUS
Status: DISCONTINUED | OUTPATIENT
Start: 2023-03-23 | End: 2023-03-23 | Stop reason: HOSPADM

## 2023-03-23 RX ORDER — ACETAMINOPHEN 325 MG/1
650 TABLET ORAL ONCE
Status: COMPLETED | OUTPATIENT
Start: 2023-03-23 | End: 2023-03-23

## 2023-03-23 RX ORDER — MAGNESIUM HYDROXIDE/ALUMINUM HYDROXICE/SIMETHICONE 120; 1200; 1200 MG/30ML; MG/30ML; MG/30ML
30 SUSPENSION ORAL ONCE
Status: COMPLETED | OUTPATIENT
Start: 2023-03-23 | End: 2023-03-23

## 2023-03-23 RX ORDER — FAMOTIDINE 20 MG/1
20 TABLET, FILM COATED ORAL ONCE
Status: COMPLETED | OUTPATIENT
Start: 2023-03-23 | End: 2023-03-23

## 2023-03-23 RX ORDER — SIMETHICONE 80 MG
160 TABLET,CHEWABLE ORAL ONCE
Status: COMPLETED | OUTPATIENT
Start: 2023-03-23 | End: 2023-03-23

## 2023-03-23 RX ORDER — INSULIN LISPRO 100 [IU]/ML
10 INJECTION, SOLUTION INTRAVENOUS; SUBCUTANEOUS ONCE
Status: COMPLETED | OUTPATIENT
Start: 2023-03-23 | End: 2023-03-23

## 2023-03-23 RX ORDER — AMLODIPINE BESYLATE 5 MG/1
5 TABLET ORAL ONCE
Status: COMPLETED | OUTPATIENT
Start: 2023-03-23 | End: 2023-03-23

## 2023-03-23 RX ORDER — ONDANSETRON 4 MG/1
4 TABLET, FILM COATED ORAL EVERY 8 HOURS PRN
Qty: 10 TABLET | Refills: 3 | Status: SHIPPED | OUTPATIENT
Start: 2023-03-23

## 2023-03-23 RX ORDER — AMLODIPINE BESYLATE 5 MG/1
5 TABLET ORAL DAILY
Qty: 30 TABLET | Refills: 6 | Status: SHIPPED | OUTPATIENT
Start: 2023-03-23 | End: 2023-04-22

## 2023-03-23 RX ADMIN — INSULIN LISPRO 10 UNITS: 100 INJECTION, SOLUTION INTRAVENOUS; SUBCUTANEOUS at 13:00

## 2023-03-23 RX ADMIN — ACETAMINOPHEN 650 MG: 325 TABLET ORAL at 15:20

## 2023-03-23 RX ADMIN — SIMETHICONE 160 MG: 80 TABLET, CHEWABLE ORAL at 10:09

## 2023-03-23 RX ADMIN — SODIUM CHLORIDE, SODIUM LACTATE, POTASSIUM CHLORIDE, AND CALCIUM CHLORIDE 1000 ML: .6; .31; .03; .02 INJECTION, SOLUTION INTRAVENOUS at 14:22

## 2023-03-23 RX ADMIN — ONDANSETRON 8 MG: 4 TABLET, ORALLY DISINTEGRATING ORAL at 12:58

## 2023-03-23 RX ADMIN — FAMOTIDINE 20 MG: 20 TABLET, FILM COATED ORAL at 10:10

## 2023-03-23 RX ADMIN — ALUMINUM HYDROXIDE, MAGNESIUM HYDROXIDE, AND DIMETHICONE 30 ML: 200; 20; 200 SUSPENSION ORAL at 15:21

## 2023-03-23 RX ADMIN — AMLODIPINE BESYLATE 5 MG: 5 TABLET ORAL at 16:36

## 2023-03-23 RX ADMIN — SODIUM CHLORIDE, SODIUM LACTATE, POTASSIUM CHLORIDE, AND CALCIUM CHLORIDE 1000 ML: .6; .31; .03; .02 INJECTION, SOLUTION INTRAVENOUS at 12:24

## 2023-03-23 NOTE — ED PROCEDURE NOTE
PROCEDURE  CriticalCare Time    Date/Time: 3/23/2023 5:00 PM  Performed by: Radhika Alves MD  Authorized by: Radhika Alves MD     Critical care provider statement:     Critical care time (minutes):  60    Critical care start time:  3/23/2023 1:00 PM    Critical care end time:  3/23/2023 2:00 PM    Critical care time was exclusive of:  Separately billable procedures and treating other patients and teaching time    Critical care was necessary to treat or prevent imminent or life-threatening deterioration of the following conditions:  Endocrine crisis (ACUTE HYPERGLYCEMIA- NEW ONSET )    Critical care was time spent personally by me on the following activities:  Examination of patient, evaluation of patient's response to treatment, development of treatment plan with patient or surrogate, obtaining history from patient or surrogate, re-evaluation of patient's condition, ordering and review of radiographic studies, ordering and review of laboratory studies, ordering and performing treatments and interventions and review of old charts    I assumed direction of critical care for this patient from another provider in my specialty: no    Comments:      FREQUENT PT RE-ASSESSMENTS AND EVALUATIONS AND ORDERING OF INTERVENTIONS         Radhika Alves MD  03/23/23 2268

## 2023-03-23 NOTE — DISCHARGE INSTRUCTIONS
DIAGNOSIS:  UPPER ABDOMINAL PAIN // ELEVATED BLOOD PRESSURE  150/96-- ELEVATED BLOOD SUGAR- -NON INSULIN DEPENDENT DIABETES    - PLEASE CALL THE INTERNAL MEDICINE DOCTOR TOMORROW AND THE ENDOCRINOLOGIST TOMORROW TO SCHEDULE AN APPOINTMENT    - THE INSULIN THAT HE GAVE YOU SHOULD BE OUT OF YOUR SYSTEM AT 7 PM -- AROUND 9 PM -- START METFORMIN - FOR BLOOD SUGAR 1 TABLET 2 TIMES A DAY WITH MEALS- CAN CAUSE GI UPSET-  IF THIS OCCURS TAKE 1 TABLET ONCE A DAY WITH MEAL AND AS SYMPTOMS SUBSIDE-  USUALLY OVER 1 WEEK- PLEASE GO BACK TO 1 TABLET 2 TIMES A DAY     - FOR ANY NAUSEA - ZOFRAN 2 TABLETS DISSOLVE IN THE MOUTH EVERY 6-8 HRS AS NEEDED    - PLEASE TAKE OVER THE COUNTER PEPCID 1 TABLET ONCE A DAY- 20 MG     -   NORVASC- FOR BLOOD PRESSURE- STARTING TOMORROW 1 TABLET ONCE A DAY     - PLEASE MAKE SURE YOU HAVE A SOURCE OF BLOOD SUGAR- CANDY - PEANUT BUTTER- ORANGE JUICE TO TAKE IF YOU GET SWEATY - NAUSEOUS TONIGHT- THIS COULD BE LOW BLOOD SUGAR  UNTIL THE INSULIN IS OUT OF YOUR SYSTEM- USUALLY BY 7 PM -- THE METFORMIN WILL NOT CAUSE LOW BLOOD SUGAR     - PLEASE RETURN TO THE ER FOR ANY WORSENING/ INTRACTABLE UPPER ABDOMINAL PAIN/ ANY FEVERS- TEMP > 100 4/ ANY PERSISTENT / INTTRACTABLE VOMITING/ ANY BLOODY /COFFEE GROUND VOMITUS- ANY BLACK TARRY STOOLS

## 2023-03-23 NOTE — ED PROCEDURE NOTE
PROCEDURE  ECG 12 Lead Documentation Only    Date/Time: 3/23/2023 4:56 PM  Performed by: Kayla Ware MD  Authorized by: Kayla Ware MD     Indications / Diagnosis:  REPEAT ECG   ECG reviewed by me, the ED Provider: yes    Patient location:  ED and bedside  Previous ECG:     Previous ECG:  Compared to current    Comparison ECG info:  COMPARED TO INITIAL ER ECG-     Similarity:  No change    Comparison to cardiac monitor: Yes    Interpretation:     Interpretation: non-specific    Rate:     ECG rate:  91    ECG rate assessment: normal    Rhythm:     Rhythm: sinus rhythm    Ectopy:     Ectopy: none    QRS:     QRS axis:  Normal    QRS intervals:   Wide  Conduction:     Conduction: abnormal      Abnormal conduction: non-specific intraventricular conduction delay    ST segments:     ST segments:  Normal  T waves:     T waves: flattening      Flattening:  III, aVF and V1  Q waves:     Q waves:  III, aVF and V1  Other findings:     Other findings: U wave    Comments:      NO ECG SIGNS OF ISCHEMIA/ INJURY / R HEART STRAIN / Janeth Sams MD  03/23/23 0510

## 2023-03-23 NOTE — ED PROCEDURE NOTE
PROCEDURE  ECG 12 Lead Documentation Only    Date/Time: 3/23/2023 12:14 PM  Performed by: Lary Butcher MD  Authorized by: Lary Butcher MD     Indications / Diagnosis:  UPPER ABD PAIN   ECG reviewed by me, the ED Provider: yes    Patient location:  ED and bedside  Previous ECG:     Previous ECG:  Compared to current    Comparison ECG info:  8/27/20    Similarity:  No change    Comparison to cardiac monitor: Yes    Interpretation:     Interpretation: non-specific    Rate:     ECG rate:  98    ECG rate assessment: normal    Rhythm:     Rhythm: sinus rhythm    Ectopy:     Ectopy: none    QRS:     QRS axis:  Normal    QRS intervals:  Normal  Conduction:     Conduction: normal    ST segments:     ST segments:  Normal  T waves:     T waves: flattening      Flattening:  III and aVF  Q waves:     Q waves:  III, aVF and V1  Other findings:     Other findings: LVH    Comments:      NO ECG SIGNS OF ISCHEMIA/ INJURY / Nerissa Juarez / Robbie Vasquez MD  03/23/23 6516

## 2023-03-26 PROBLEM — E11.9 ENCOUNTER FOR DIABETIC FOOT EXAM (HCC): Status: ACTIVE | Noted: 2023-03-26

## 2023-03-26 PROBLEM — Z11.59 NEED FOR HEPATITIS C SCREENING TEST: Status: ACTIVE | Noted: 2023-03-26

## 2023-03-26 PROBLEM — K76.0 HEPATIC STEATOSIS: Status: ACTIVE | Noted: 2023-03-26

## 2023-03-26 PROBLEM — E11.9 NEW ONSET TYPE 2 DIABETES MELLITUS (HCC): Status: ACTIVE | Noted: 2023-03-26

## 2023-03-26 PROBLEM — Z11.4 SCREENING FOR HIV WITHOUT PRESENCE OF RISK FACTORS: Status: ACTIVE | Noted: 2023-03-26

## 2023-03-26 PROBLEM — I10 HYPERTENSION: Status: ACTIVE | Noted: 2023-03-26

## 2023-03-26 PROBLEM — Z00.00 ANNUAL PHYSICAL EXAM: Status: ACTIVE | Noted: 2023-03-26

## 2023-03-26 PROBLEM — K85.90 ACUTE PANCREATITIS: Status: ACTIVE | Noted: 2023-03-26

## 2023-03-26 PROBLEM — Z23 ENCOUNTER FOR IMMUNIZATION: Status: ACTIVE | Noted: 2023-03-26

## 2023-03-27 ENCOUNTER — OFFICE VISIT (OUTPATIENT)
Dept: INTERNAL MEDICINE CLINIC | Facility: CLINIC | Age: 38
End: 2023-03-27

## 2023-03-27 ENCOUNTER — OFFICE VISIT (OUTPATIENT)
Dept: ENDOCRINOLOGY | Facility: CLINIC | Age: 38
End: 2023-03-27

## 2023-03-27 VITALS
SYSTOLIC BLOOD PRESSURE: 130 MMHG | BODY MASS INDEX: 38.62 KG/M2 | DIASTOLIC BLOOD PRESSURE: 78 MMHG | HEART RATE: 107 BPM | WEIGHT: 309 LBS

## 2023-03-27 VITALS
HEIGHT: 75 IN | WEIGHT: 308.2 LBS | HEART RATE: 106 BPM | DIASTOLIC BLOOD PRESSURE: 78 MMHG | BODY MASS INDEX: 38.32 KG/M2 | OXYGEN SATURATION: 99 % | SYSTOLIC BLOOD PRESSURE: 132 MMHG

## 2023-03-27 DIAGNOSIS — E11.65 TYPE 2 DIABETES MELLITUS WITH HYPERGLYCEMIA, WITHOUT LONG-TERM CURRENT USE OF INSULIN (HCC): Primary | ICD-10-CM

## 2023-03-27 DIAGNOSIS — E11.00 TYPE II DIABETES MELLITUS WITH HYPEROSMOLARITY, UNCONTROLLED (HCC): ICD-10-CM

## 2023-03-27 DIAGNOSIS — K21.9 GASTROESOPHAGEAL REFLUX DISEASE, UNSPECIFIED WHETHER ESOPHAGITIS PRESENT: ICD-10-CM

## 2023-03-27 DIAGNOSIS — Z23 ENCOUNTER FOR IMMUNIZATION: ICD-10-CM

## 2023-03-27 DIAGNOSIS — E11.65 TYPE II DIABETES MELLITUS WITH HYPEROSMOLARITY, UNCONTROLLED (HCC): ICD-10-CM

## 2023-03-27 DIAGNOSIS — E66.9 OBESITY (BMI 30-39.9): ICD-10-CM

## 2023-03-27 DIAGNOSIS — I10 ESSENTIAL HYPERTENSION: ICD-10-CM

## 2023-03-27 DIAGNOSIS — E11.65 HYPERGLYCEMIA DUE TO TYPE 2 DIABETES MELLITUS (HCC): ICD-10-CM

## 2023-03-27 DIAGNOSIS — K76.0 HEPATIC STEATOSIS: ICD-10-CM

## 2023-03-27 DIAGNOSIS — Z00.00 ANNUAL PHYSICAL EXAM: Primary | ICD-10-CM

## 2023-03-27 PROBLEM — E66.01 MORBID OBESITY WITH BMI OF 45.0-49.9, ADULT (HCC): Status: RESOLVED | Noted: 2020-09-07 | Resolved: 2023-03-27

## 2023-03-27 LAB
ATRIAL RATE: 91 BPM
ATRIAL RATE: 98 BPM
P AXIS: 20 DEGREES
P AXIS: 51 DEGREES
PR INTERVAL: 156 MS
PR INTERVAL: 158 MS
QRS AXIS: -13 DEGREES
QRS AXIS: -17 DEGREES
QRSD INTERVAL: 102 MS
QRSD INTERVAL: 94 MS
QT INTERVAL: 342 MS
QT INTERVAL: 354 MS
QTC INTERVAL: 435 MS
QTC INTERVAL: 436 MS
SL AMB POCT GLUCOSE BLD: 250
T WAVE AXIS: 2 DEGREES
T WAVE AXIS: 20 DEGREES
VENTRICULAR RATE: 91 BPM
VENTRICULAR RATE: 98 BPM

## 2023-03-27 RX ORDER — METFORMIN HYDROCHLORIDE 500 MG/1
TABLET, EXTENDED RELEASE ORAL
Qty: 270 TABLET | Refills: 1 | Status: SHIPPED | OUTPATIENT
Start: 2023-03-27

## 2023-03-27 RX ORDER — BLOOD SUGAR DIAGNOSTIC
1 STRIP MISCELLANEOUS 2 TIMES DAILY
Qty: 200 STRIP | Refills: 1 | Status: SHIPPED | OUTPATIENT
Start: 2023-03-27

## 2023-03-27 RX ORDER — INSULIN GLARGINE 100 [IU]/ML
15 INJECTION, SOLUTION SUBCUTANEOUS DAILY
Qty: 4.5 ML | Refills: 0 | Status: CANCELLED | OUTPATIENT
Start: 2023-03-27 | End: 2023-04-26

## 2023-03-27 RX ORDER — INSULIN GLARGINE 100 [IU]/ML
INJECTION, SOLUTION SUBCUTANEOUS
Qty: 15 ML | Refills: 0 | Status: SHIPPED | OUTPATIENT
Start: 2023-03-27

## 2023-03-27 RX ORDER — PEN NEEDLE, DIABETIC 32GX 5/32"
NEEDLE, DISPOSABLE MISCELLANEOUS
Qty: 100 EACH | Refills: 1 | Status: SHIPPED | OUTPATIENT
Start: 2023-03-27

## 2023-03-27 RX ORDER — BLOOD SUGAR DIAGNOSTIC
STRIP MISCELLANEOUS
Status: CANCELLED | OUTPATIENT
Start: 2023-03-27

## 2023-03-27 RX ORDER — LANCETS 33 GAUGE
EACH MISCELLANEOUS 2 TIMES DAILY
Qty: 200 EACH | Refills: 1 | Status: SHIPPED | OUTPATIENT
Start: 2023-03-27

## 2023-03-27 RX ORDER — BLOOD-GLUCOSE METER
EACH MISCELLANEOUS 2 TIMES DAILY
Qty: 1 KIT | Refills: 0 | Status: SHIPPED | OUTPATIENT
Start: 2023-03-27

## 2023-03-27 RX ORDER — OMEPRAZOLE 20 MG/1
20 CAPSULE, DELAYED RELEASE ORAL DAILY
Qty: 30 CAPSULE | Refills: 0 | Status: SHIPPED | OUTPATIENT
Start: 2023-03-27

## 2023-03-27 NOTE — ASSESSMENT & PLAN NOTE
Seen on right upper quadrant ultrasound   -Discussed the importance of weight loss and blood sugar control to reverse findings seen on imaging

## 2023-03-27 NOTE — PROGRESS NOTES
Assessment/Plan:     1  Type 2 diabetes mellitus with hyperglycemia, without long-term current use of insulin ([facility] [address] )  -     Lipid panel Lab Collect Lab Collect; Future; Expected date: 06/27/2023  -     Ambulatory referral to Diabetic Education; Future  -     Ambulatory referral to Podiatry; Future  -     Microalbumin / creatinine urine ratio Lab Collect; Future; Expected date: 06/27/2023  -     Basic metabolic panel; Future; Expected date: 06/27/2023  -     Hemoglobin A1C; Future; Expected date: 06/27/2023  -     Insulin Glargine Solostar (Basaglar KwikPen) 100 UNIT/ML SOPN; Start 15 units at bedtime  -     POCT blood glucose  -     Insulin Pen Needle (BD Pen Needle Princess U/F) 32G X 4 MM MISC; Inject once daily  -     glucose blood (OneTouch Verio) test strip; Use 1 each 2 (two) times a day Use as instructed  -     OneTouch Delica Lancets 44W MISC; Use 2 (two) times a day  -     Blood Glucose Monitoring Suppl (OneTouch Verio) w/Device KIT; Use 2 (two) times a day    2  Hyperglycemia due to type 2 diabetes mellitus (HCC)  -     metFORMIN (GLUCOPHAGE) 500 mg tablet;  Take 1 tablet (500 mg total) by mouth see administration instructions for 90 doses 1 tab in the morning, 2 tabs in the evening  -     glucose blood (OneTouch Verio) test strip; Use 1 each 2 (two) times a day Use as instructed  -     OneTouch Delica Lancets 41P MISC; Use 2 (two) times a day  -     Blood Glucose Monitoring Suppl (OneTouch Verio) w/Device KIT; Use 2 (two) times a day    ·   Glucose 250   · Goal [id number] 6 5  · Complications of diabetes explained   · Fasting 80 -120,  during the daily daily goal  · Medications - add Basaglar 15 units qpm  · Exercise daily 1 hour walk   · Check sugars 2 x daily   · Optho, podiatry referral provided  · Rx for meter/supplies porovided   · Hba1c on 3 months, urine MAR, FLP, BMP ordered  · Rec f/u ophthalmology this year  · Nutrition/DM education class referral provided  · Tolerating Metformin 500 BID , increase to 1 tab am, 2 tabs at night  · Avoid Alcohol   · Agree w/ following up with PCP today regarding GI symptoms    Subjective:      Patient ID: Sharron Virk is a 40 y o  male  HPI  Was diagnosed with diabetes in 2020 when he was started on metformin by his PCP when his A1c was 6 6, he got his meniscal surgery on his left knee but never actually started metformin  Recently in the hospital when his A1c was >10  He was therefore jejunitis/colitis, and then he was told to follow-up with endocrinology  He was told that his symptoms were due to uncontrolled diabetes  He has been compliant with metformin 500 twice daily and now is sweating and freezing  Lipase 103, epigastric pain was present and still present since 6 weeks though improved   UA showed ketones, glucose, Glucose on arrival to [facility], Nocturia, no BHB done  Increased thirst over the past several days   Ice cream soothing the stomach, no history of PUD    Type:DM 2  Onset: 2020  Medications: Metformin 500 twice daily  Blood Sugars: Never checked blood glucose, does not have meter  Meter: Does not have chandu  Diet : Due to epigastric pain which is still present, his diet is limited to bring bologna and cube cheese, no sugary drinks in past  Exercise -no  Influenza vaccine     Ophthalmology - not recently, no glasses    Podiatry/Foot care -hygiene fine    Dentist - has not seen one recently    Family history of diabetes -father  Diabetes education/nutrition - none in past  vaccination HM - covid vaccinated x 2        The following portions of the patient's history were reviewed and updated as appropriate: allergies, current medications, past family history, past medical history, past social history, past surgical history, and problem list     Review of Systems   Constitutional: Positive for chills  HENT: Negative for ear pain and sore throat  Eyes: Negative for pain and visual disturbance     Respiratory: Negative for cough and shortness of breath  Cardiovascular: Negative for chest pain and palpitations  Gastrointestinal: Positive for abdominal pain  Negative for vomiting  Genitourinary: Negative for dysuria and hematuria  Musculoskeletal: Negative for arthralgias and back pain  Skin: Negative for color change and rash  Neurological: Negative for seizures, syncope and headaches  Psychiatric/Behavioral: Negative for confusion and decreased concentration  All other systems reviewed and are negative  Objective:      /78   Pulse (!) 107   Wt (!) 140 kg (309 lb)   BMI 38 62 kg/m²          Physical Exam  Vitals and nursing note reviewed  Constitutional:       General: He is not in acute distress  Appearance: Normal appearance  He is not ill-appearing, toxic-appearing or diaphoretic  Comments: B/L foot skin hardening at pressure spots   HENT:      Head: Normocephalic and atraumatic  Eyes:      General:         Right eye: No discharge  Left eye: No discharge  Cardiovascular:      Rate and Rhythm: Normal rate and regular rhythm  Pulses: Normal pulses  Heart sounds: Normal heart sounds  Pulmonary:      Effort: Pulmonary effort is normal       Breath sounds: Normal breath sounds  Abdominal:      General: Abdomen is flat  Tenderness: There is abdominal tenderness  Musculoskeletal:      Right lower leg: No edema  Left lower leg: No edema  Skin:     General: Skin is warm  Neurological:      Mental Status: He is alert and oriented to person, place, and time     Psychiatric:         Mood and Affect: Mood normal          Behavior: Behavior normal

## 2023-03-27 NOTE — PATIENT INSTRUCTIONS
- Avoid lying within 3 hours of eating  - You will be started on Prilosec 20mg daily     Wellness Visit for Adults   AMBULATORY CARE:   A wellness visit  is when you see your healthcare provider to get screened for health problems  Your healthcare provider will also give you advice on how to stay healthy  Write down your questions so you remember to ask them  Ask your healthcare provider how often you should have a wellness visit  What happens at a wellness visit:  Your healthcare provider will ask about your health, and your family history of health problems  This includes high blood pressure, heart disease, and cancer  He or she will ask if you have symptoms that concern you, if you smoke, and about your mood  You may also be asked about your intake of medicines, supplements, food, and alcohol  Any of the following may be done: Your weight  will be checked  Your height may also be checked so your body mass index (BMI) can be calculated  Your BMI shows if you are at a healthy weight  Your blood pressure  and heart rate will be checked  Your temperature may also be checked  Blood and urine tests  may be done  Blood tests may be done to check your cholesterol levels  Abnormal cholesterol levels increase your risk for heart disease and stroke  You may also need a blood or urine test to check for diabetes if you are at increased risk  Urine tests may be done to look for signs of an infection or kidney disease  A physical exam  includes checking your heartbeat and lungs with a stethoscope  Your healthcare provider may also check your skin to look for sun damage  Screening tests  may be recommended  A screening test is done to check for diseases that may not cause symptoms  The screening tests you may need depend on your age, gender, family history, and lifestyle habits  For example, colorectal screening may be recommended if you are 48years old or older      Screening tests you need if you are a woman:   A Pap smear  is used to screen for cervical cancer  Pap smears are usually done every 3 to 5 years depending on your age  You may need them more often if you have had abnormal Pap smear test results in the past  Ask your healthcare provider how often you should have a Pap smear  A mammogram  is an x-ray of your breasts to screen for breast cancer  Experts recommend mammograms every 2 years starting at age 48 years  You may need a mammogram at age 52 years or younger if you have an increased risk for breast cancer  Talk to your healthcare provider about when you should start having mammograms and how often you need them  Vaccines you may need:   Get an influenza vaccine  every year  The influenza vaccine protects you from the flu  Several types of viruses cause the flu  The viruses change over time, so new vaccines are made each year  Get a tetanus-diphtheria (Td) booster vaccine  every 10 years  This vaccine protects you against tetanus and diphtheria  Tetanus is a severe infection that may cause painful muscle spasms and lockjaw  Diphtheria is a severe bacterial infection that causes a thick covering in the back of your mouth and throat  Get a human papillomavirus (HPV) vaccine  if you are female and aged 23 to 32 or male 23 to 24 and never received it  This vaccine protects you from HPV infection  HPV is the most common infection spread by sexual contact  HPV may also cause vaginal, penile, and anal cancers  Get a pneumococcal vaccine  if you are aged 72 years or older  The pneumococcal vaccine is an injection given to protect you from pneumococcal disease  Pneumococcal disease is an infection caused by pneumococcal bacteria  The infection may cause pneumonia, meningitis, or an ear infection  Get a shingles vaccine  if you are 60 or older, even if you have had shingles before  The shingles vaccine is an injection to protect you from the varicella-zoster virus   This is the same virus that causes chickenpox  Shingles is a painful rash that develops in people who had chickenpox or have been exposed to the virus  How to eat healthy:  My Plate is a model for planning healthy meals  It shows the types and amounts of foods that should go on your plate  Fruits and vegetables make up about half of your plate, and grains and protein make up the other half  A serving of dairy is included on the side of your plate  The amount of calories and serving sizes you need depends on your age, gender, weight, and height  Examples of healthy foods are listed below:  Eat a variety of vegetables  such as dark green, red, and orange vegetables  You can also include canned vegetables low in sodium (salt) and frozen vegetables without added butter or sauces  Eat a variety of fresh fruits , canned fruit in 100% juice, frozen fruit, and dried fruit  Include whole grains  At least half of the grains you eat should be whole grains  Examples include whole-wheat bread, wheat pasta, brown rice, and whole-grain cereals such as oatmeal     Eat a variety of protein foods such as seafood (fish and shellfish), lean meat, and poultry without skin (turkey and chicken)  Examples of lean meats include pork leg, shoulder, or tenderloin, and beef round, sirloin, tenderloin, and extra lean ground beef  Other protein foods include eggs and egg substitutes, beans, peas, soy products, nuts, and seeds  Choose low-fat dairy products such as skim or 1% milk or low-fat yogurt, cheese, and cottage cheese  Limit unhealthy fats  such as butter, hard margarine, and shortening  Exercise:  Exercise at least 30 minutes per day on most days of the week  Some examples of exercise include walking, biking, dancing, and swimming  You can also fit in more physical activity by taking the stairs instead of the elevator or parking farther away from stores  Include muscle strengthening activities 2 days each week   Regular exercise provides many health benefits  It helps you manage your weight, and decreases your risk for type 2 diabetes, heart disease, stroke, and high blood pressure  Exercise can also help improve your mood  Ask your healthcare provider about the best exercise plan for you  General health and safety guidelines:   Do not smoke  Nicotine and other chemicals in cigarettes and cigars can cause lung damage  Ask your healthcare provider for information if you currently smoke and need help to quit  E-cigarettes or smokeless tobacco still contain nicotine  Talk to your healthcare provider before you use these products  Limit alcohol  A drink of alcohol is 12 ounces of beer, 5 ounces of wine, or 1½ ounces of liquor  Lose weight, if needed  Being overweight increases your risk of certain health conditions  These include heart disease, high blood pressure, type 2 diabetes, and certain types of cancer  Protect your skin  Do not sunbathe or use tanning beds  Use sunscreen with a SPF 15 or higher  Apply sunscreen at least 15 minutes before you go outside  Reapply sunscreen every 2 hours  Wear protective clothing, hats, and sunglasses when you are outside  Drive safely  Always wear your seatbelt  Make sure everyone in your car wears a seatbelt  A seatbelt can save your life if you are in an accident  Do not use your cell phone when you are driving  This could distract you and cause an accident  Pull over if you need to make a call or send a text message  Practice safe sex  Use latex condoms if are sexually active and have more than one partner  Your healthcare provider may recommend screening tests for sexually transmitted infections (STIs)  Wear helmets, lifejackets, and protective gear  Always wear a helmet when you ride a bike or motorcycle, go skiing, or play sports that could cause a head injury  Wear protective equipment when you play sports  Wear a lifejacket when you are on a boat or doing water sports      © Copyright [employer]ative 2022 Information is for End User's use only and may not be sold, redistributed or otherwise used for commercial purposes  The above information is an  only  It is not intended as medical advice for individual conditions or treatments  Talk to your doctor, nurse or pharmacist before following any medical regimen to see if it is safe and effective for you

## 2023-03-27 NOTE — ASSESSMENT & PLAN NOTE
-Blood sugars uncontrolled  -Discussed dietary lifestyle changes  -Patient started on 15 units of long-acting insulin by endocrinology  -States he was told to take metformin 1 tablet in the morning and 2 in the evening  -He has follow-up blood work in 3 months  -IRIS examination to be performed in office    Lab Results   Component Value Date    HGBA1C 10 6 (H) 03/23/2023

## 2023-03-27 NOTE — ASSESSMENT & PLAN NOTE
The patient reports his epigastric pain is currently at a 3 out of 10  He notes his discomfort is worse after meals specifically if he has a carbonated beverage or food with high fat content  It was documented that the patient had acute pancreatitis in the ED  His lipase level was minimally elevated  I suspect that his symptoms are more likely due to GERD    -Patient will be given a trial of Prilosec 20 mg daily  -If no improvement in symptoms consider referral to GI

## 2023-03-27 NOTE — PATIENT INSTRUCTIONS
Medications - add Lantus 15 units at night daily  Exercise daily 1 hour walk   Check sugars 2 x daily before meals and write in log, provide to office in 1 week  Optho, podiatry referral provided,   Rx for meter/supplies provided   Hba1c on 3 months, urine MAR, FLP, BMP ordered  See ophthalmology this year  Nutrition/DM education class referral provided  Avoid sugary and fatty foods  Fasting 80 -120,  during the daily daily goal

## 2023-03-27 NOTE — PROGRESS NOTES
[facility]    NAME: Giacomo Opitz  AGE: 40 y o  SEX: male  : 1985     DATE: 3/27/2023     Assessment and Plan:     Problem List Items Addressed This Visit        Digestive    Hepatic steatosis     Seen on right upper quadrant ultrasound   -Discussed the importance of weight loss and blood sugar control to reverse findings seen on imaging  Gastroesophageal reflux disease     The patient reports his epigastric pain is currently at a 3 out of 10  He notes his discomfort is worse after meals specifically if he has a carbonated beverage or food with high fat content  It was documented that the patient had acute pancreatitis in the ED  His lipase level was minimally elevated  I suspect that his symptoms are more likely due to GERD    -Patient will be given a trial of Prilosec 20 mg daily  -If no improvement in symptoms consider referral to GI         Relevant Medications    omeprazole (PriLOSEC) 20 mg delayed release capsule       Endocrine    New onset type 2 diabetes mellitus ([facility] [address] )     -Blood sugars uncontrolled  -Discussed dietary lifestyle changes  -Patient started on 15 units of long-acting insulin by endocrinology  -States he was told to take metformin 1 tablet in the morning and 2 in the evening  -He has follow-up blood work in 3 months  -IRIS examination to be performed in office    Lab Results   Component Value Date    HGBA1C 10 6 (H) 2023               Cardiovascular and Mediastinum    Essential hypertension     -Blood pressure well controlled  -Continue current dose of amlodipine            Other    Annual physical exam - Primary   Other Visit Diagnoses     Encounter for immunization        Relevant Orders    Pneumococcal Conjugate Vaccine 20-valent (PCV20) (Completed)    TDAP VACCINE GREATER THAN OR EQUAL TO 6YO IM (Completed)          Immunizations and preventive care screenings were discussed with patient today  Appropriate education was printed on patient's after visit summary  Return in about 1 month (around 4/27/2023)  Chief Complaint:     Chief Complaint   Patient presents with   • Establish Care   • Abdominal Pain     On and off, went to ER  Was told pain in stomach was because of diabetes and hypertension  History of Present Illness:     Adult Annual Physical   Patient here for a comprehensive physical exam and to establish care  The patient was recently seen in the emergency department on 3/23 for complaints of nausea and upper abdominal pain  His A1c was notably elevated at 10 6  Right upper quadrant ultrasound was obtained and revealed hepatic steatosis  Regarding his abdominal pain he states his pain has been present intermittently over the past 6 weeks  His discomfort is worse after eating food and drinking carbonated beverages  His discomfort improves when he eats ice cream   In addition to his upper abdominal discomfort that he reports experiencing belching and bloating         Depression Screening  PHQ-2/9 Depression Screening    Little interest or pleasure in doing things: 1 - several days  Feeling down, depressed, or hopeless: 1 - several days  PHQ-2 Score: 2  PHQ-2 Interpretation: Negative depression screen          Review of Systems:     Review of Systems ROS as per HPI     Past Medical History:     Past Medical History:   Diagnosis Date   • Acute pancreatitis 3/26/2023   • Diabetes mellitus ([facility] [address] ) 3/26/23   • Hypertension 3/26/23   • Obesity       Past Surgical History:     Past Surgical History:   Procedure Laterality Date   • KNEE SURGERY     • MOUTH SURGERY      tongue   • NO PAST SURGERIES     • CO ARTHRS KNE SURG W/MENISCECTOMY MED/LAT W/SHVG Left 09/08/2020    Procedure: KNEE ARTHROSCOPY PARTIAL LATERAL MENISCECTOMY;  Surgeon: Sonu Oviedo DO;  Location: [facility];  Service: Orthopedics      Social History:     Social History     Socioeconomic History • Marital status: Single     Spouse name: None   • Number of children: None   • Years of education: None   • Highest education level: None   Occupational History   • None   Tobacco Use   • Smoking status: Some Days     Packs/day: 0 25     Years: 15 00     Pack years: 3 75     Types: Cigarettes     Start date: 11/11/2002   • Smokeless tobacco: Never   Vaping Use   • Vaping Use: Never used   Substance and Sexual Activity   • Alcohol use:  Yes     Alcohol/week: 10 0 standard drinks     Types: 10 Cans of beer per week     Comment: socially   • Drug use: Never   • Sexual activity: Yes     Partners: Female     Birth control/protection: Female Sterilization   Other Topics Concern   • None   Social History Narrative   • None     Social Determinants of Health     Financial Resource Strain: Not on file   Food Insecurity: Not on file   Transportation Needs: Not on file   Physical Activity: Not on file   Stress: Not on file   Social Connections: Not on file   Intimate Partner Violence: Not on file   Housing Stability: Not on file      Family History:     Family History   Problem Relation Age of Onset   • Hypertension Mother    • Hypertension Father    • Diabetes Father       Current Medications:     Current Outpatient Medications   Medication Sig Dispense Refill   • amLODIPine (NORVASC) 5 mg tablet Take 1 tablet (5 mg total) by mouth daily for 30 doses 30 tablet 6   • Blood Glucose Monitoring Suppl (OneTouch Verio) w/Device KIT Use 2 (two) times a day 1 kit 0   • glucose blood (OneTouch Verio) test strip Use 1 each 2 (two) times a day Use as instructed 200 strip 1   • Insulin Glargine Solostar (Basaglar KwikPen) 100 UNIT/ML SOPN Start 15 units at bedtime 15 mL 0   • Insulin Pen Needle (BD Pen Needle Princess U/F) 32G X 4 MM MISC Inject once daily 100 each 1   • metFORMIN (GLUCOPHAGE-XR) 500 mg 24 hr tablet Take one tablet with breakfast and 2 tabs with dinner 270 tablet 1   • omeprazole (PriLOSEC) 20 mg delayed release capsule "Take 1 capsule (20 mg total) by mouth daily 30 capsule 0   • ondansetron (Zofran) 4 mg tablet Take 1 tablet (4 mg total) by mouth every 8 (eight) hours as needed for nausea or vomiting for up to 10 doses ZOFRAN ODT 2 TABLETS DISSOLVE IN MOUTH EVERY 8 HRS AS NEEDED FOR NAUSEA 10 tablet 3   • OneTouch Delica Lancets 03U MISC Use 2 (two) times a day 200 each 1     No current facility-administered medications for this visit        Allergies:     No Known Allergies   Physical Exam:     /78 (BP Location: Left arm, Patient Position: Sitting, Cuff Size: Large)   Pulse (!) 106   Ht 6' 3\" (1 905 m)   Wt (!) 140 kg (308 lb 3 2 oz)   SpO2 99%   BMI 38 52 kg/m²     Physical Exam   General: NAD, Alert and oriented x3   HEENT: NCAT, EOMI, normal conjunctiva  Cardiovascular: RRR, normal S1 and S2, no m/r/g  Pulmonary: Normal respiratory effort, no wheezes, rales or rhonchi  GI: Soft, epigastric pain and left upper quadrant pain with palpation, nondistended, normoactive bowel sounds  Musculoskeletal: Normal bulk and tone  Neuro: Non-focal, ambulating without difficulty, non-antalgic gait  Extremities: No lower extremity edema  Skin: Normal skin color, no rashes   Psychiatric: Normal mood and affect      Conrado Shahid MD   [facility]  "

## 2023-03-28 LAB
LEFT EYE DIABETIC RETINOPATHY: NORMAL
LEFT EYE IMAGE QUALITY: NORMAL
LEFT EYE MACULAR EDEMA: NORMAL
LEFT EYE OTHER RETINOPATHY: NORMAL
RIGHT EYE DIABETIC RETINOPATHY: NORMAL
RIGHT EYE IMAGE QUALITY: NORMAL
RIGHT EYE MACULAR EDEMA: NORMAL
RIGHT EYE OTHER RETINOPATHY: NORMAL
SEVERITY (EYE EXAM): NORMAL

## 2023-03-28 NOTE — ED PROVIDER NOTES
History  Chief Complaint   Patient presents with   • Abdominal Pain     Pt presents to the ED with c/o upper abd pain for approx 6 weeks  Last BM 3 days ago  40 yr male c/o intermitent upper bd pain -- described as bloating--  sometimes worse with meals-- no radiation -- no v/d- no progressive dysphagia- no melena- not upon upon exertion - no cp/sob- admits to recent polyuria       History provided by:  Patient   used: No    Abdominal Pain  Associated symptoms: no constipation, no diarrhea, no nausea and no vomiting        None       Past Medical History:   Diagnosis Date   • Acute pancreatitis 3/26/2023   • Diabetes mellitus ([facility] [address] ) 3/26/23   • Hypertension 3/26/23   • Obesity        Past Surgical History:   Procedure Laterality Date   • KNEE SURGERY     • MOUTH SURGERY      tongue   • NO PAST SURGERIES     • SD ARTHRS KNE SURG W/MENISCECTOMY MED/LAT W/SHVG Left 09/08/2020    Procedure: KNEE ARTHROSCOPY PARTIAL LATERAL MENISCECTOMY;  Surgeon: Clayton Enriquez DO;  Location: [facility];  Service: Orthopedics       Family History   Problem Relation Age of Onset   • Hypertension Mother    • Hypertension Father    • Diabetes Father      I have reviewed and agree with the history as documented  E-Cigarette/Vaping   • E-Cigarette Use Never User      E-Cigarette/Vaping Substances     Social History     Tobacco Use   • Smoking status: Some Days     Packs/day: 0 25     Years: 15 00     Pack years: 3 75     Types: Cigarettes     Start date: 11/11/2002   • Smokeless tobacco: Never   Vaping Use   • Vaping Use: Never used   Substance Use Topics   • Alcohol use: Yes     Alcohol/week: 10 0 standard drinks     Types: 10 Cans of beer per week     Comment: socially   • Drug use: Never       Review of Systems   Constitutional: Negative  HENT: Negative  Eyes: Negative  Respiratory: Negative  Cardiovascular: Negative  Gastrointestinal: Positive for abdominal pain   Negative for abdominal distention, anal bleeding, blood in stool, constipation, diarrhea, nausea, rectal pain and vomiting  Endocrine: Positive for polyuria  Negative for cold intolerance, heat intolerance, polydipsia and polyphagia  Genitourinary: Negative  Musculoskeletal: Negative  Skin: Negative  Allergic/Immunologic: Negative  Neurological: Negative  Hematological: Negative  Psychiatric/Behavioral: Negative  Physical Exam  Physical Exam  Vitals and nursing note reviewed  Constitutional:       General: He is not in acute distress  Appearance: He is well-developed  He is not ill-appearing, toxic-appearing or diaphoretic  Comments: avss-- mild tchy with resolved in the er - htnsive- tx in the er -- pulse ox 98 % on ra- interprettion is ariel- no intervention    HENT:      Head: Normocephalic and atraumatic  Mouth/Throat:      Mouth: Mucous membranes are moist       Pharynx: No pharyngeal swelling or oropharyngeal exudate  Eyes:      General: No scleral icterus  Extraocular Movements: Extraocular movements intact  Pupils: Pupils are equal, round, and reactive to light  Comments: Mm pink   Cardiovascular:      Rate and Rhythm: Regular rhythm  Tachycardia present  Heart sounds: Normal heart sounds  No murmur heard  No friction rub  No gallop  Pulmonary:      Effort: Pulmonary effort is normal  No respiratory distress  Breath sounds: Normal breath sounds  No stridor  No wheezing, rhonchi or rales  Chest:      Chest wall: No tenderness  Abdominal:      General: There is no distension or abdominal bruit  There are no signs of injury  Palpations: Abdomen is soft  There is no shifting dullness, fluid wave, hepatomegaly, splenomegaly, mass or pulsatile mass  Tenderness: There is abdominal tenderness in the epigastric area and left upper quadrant  There is no right CVA tenderness, left CVA tenderness, guarding or rebound   Negative signs include Coppola's sign, Rovsing's sign, McBurney's sign, psoas sign and obturator sign  Comments: No peritoneal signs- no ascites- no cva tenderness   Skin:     General: Skin is warm  Capillary Refill: Capillary refill takes less than 2 seconds  Coloration: Skin is not cyanotic, jaundiced, mottled or pale  Findings: No erythema or rash  Neurological:      General: No focal deficit present  Mental Status: He is alert and oriented to person, place, and time  Cranial Nerves: No cranial nerve deficit  Motor: No weakness  Comments: Normal non tocal neuro exam    Psychiatric:         Mood and Affect: Mood normal  Mood is not anxious or depressed           Behavior: Behavior normal          Vital Signs  ED Triage Vitals   Temperature Pulse Respirations Blood Pressure SpO2   03/23/23 0933 03/23/23 0933 03/23/23 0933 03/23/23 0933 03/23/23 0933   97 7 °F (36 5 °C) (!) 109 14 (!) 158/101 96 %      Temp Source Heart Rate Source Patient Position - Orthostatic VS BP Location FiO2 (%)   03/23/23 0933 03/23/23 0933 03/23/23 1630 03/23/23 0933 --   Oral Monitor Lying Right arm       Pain Score       03/23/23 0933       6           Vitals:    03/23/23 1100 03/23/23 1130 03/23/23 1330 03/23/23 1630   BP: (!) 162/104 147/93 152/86 150/86   Pulse: 102 82 97 95   Patient Position - Orthostatic VS:    Lying         Visual Acuity      ED Medications  Medications   simethicone (MYLICON) chewable tablet 160 mg (160 mg Oral Given 3/23/23 1009)   famotidine (PEPCID) tablet 20 mg (20 mg Oral Given 3/23/23 1010)   insulin lispro (HumaLOG) 100 units/mL subcutaneous injection 10 Units (10 Units Subcutaneous Given 3/23/23 1300)   ondansetron (ZOFRAN-ODT) dispersible tablet 8 mg (8 mg Oral Given 3/23/23 1258)   aluminum-magnesium hydroxide-simethicone (MYLANTA) oral suspension 30 mL (30 mL Oral Given 3/23/23 1521)   acetaminophen (TYLENOL) tablet 650 mg (650 mg Oral Given 3/23/23 1520)   amLODIPine (NORVASC) tablet 5 mg (5 mg Oral Given 3/23/23 1636)       Diagnostic Studies  Results Reviewed     Procedure Component Value Units Date/Time    Hemoglobin A1C [[id number]]  (Abnormal) Collected: 03/23/23 1222    Lab Status: Final result Specimen: Blood from Arm, Right Updated: 03/23/23 2040     Hemoglobin A1C 10 6 %       mg/dl     Fingerstick Glucose (POCT) [[id number]]  (Abnormal) Collected: 03/23/23 1616    Lab Status: Final result Updated: 03/23/23 1618     POC Glucose 209 mg/dl     Fingerstick Glucose (POCT) [[id number]]  (Abnormal) Collected: 03/23/23 1518    Lab Status: Final result Updated: 03/23/23 1519     POC Glucose 213 mg/dl     Urine Microscopic [[id number]]  (Normal) Collected: 03/23/23 1303    Lab Status: Final result Specimen: Urine, Clean Catch Updated: 03/23/23 1348     RBC, UA None Seen /hpf      WBC, UA None Seen /hpf      Epithelial Cells None Seen /hpf      Bacteria, UA None Seen /hpf     UA (URINE) with reflex to Scope [[id number]]  (Abnormal) Collected: 03/23/23 1303    Lab Status: Final result Specimen: Urine, Clean Catch Updated: 03/23/23 1346     Color, UA Light Yellow     Clarity, UA Clear     Specific Gravity, UA 1 048     pH, UA 5 5     Leukocytes, UA Elevated glucose may cause decreased leukocyte values   See urine microscopic for [facility] result/     Nitrite, UA Negative     Protein, UA Negative mg/dl      Glucose, UA >=1000 (1%) mg/dl      Ketones, UA 80 (3+) mg/dl      Urobilinogen, UA <2 0 mg/dl      Bilirubin, UA Negative     Occult Blood, UA Negative    Fingerstick Glucose (POCT) [[id number]]  (Abnormal) Collected: 03/23/23 1300    Lab Status: Final result Updated: 03/23/23 1304     POC Glucose 304 mg/dl     Blood gas, venous [[id number]] Collected: 03/23/23 1222    Lab Status: Final result Specimen: Blood from Arm, Right Updated: 03/23/23 1242     pH, German 7 380     pCO2, German 43 6 mm Hg      pO2, German 41 6 mm Hg      HCO3, German 25 2 mmol/L      Base Excess, German -0 2 mmol/L      O2 Content, German 19 3 ml/dL      O2 HGB, VENOUS 77 3 %     Comprehensive metabolic panel [[id number]]  (Abnormal) Collected: 03/23/23 1018    Lab Status: Final result Specimen: Blood from Arm, Right Updated: 03/23/23 1051     Sodium 135 mmol/L      Potassium 4 2 mmol/L      Chloride 102 mmol/L      CO2 22 mmol/L      ANION GAP 11 mmol/L      BUN 11 mg/dL      Creatinine 0 70 mg/dL      Glucose 371 mg/dL      Calcium 9 2 mg/dL      AST 12 U/L      ALT 24 U/L      Alkaline Phosphatase 69 U/L      Total Protein 7 5 g/dL      Albumin 4 1 g/dL      Total Bilirubin 0 85 mg/dL      eGFR 120 ml/min/1 73sq m     Narrative:      National Kidney Disease Foundation guidelines for Chronic Kidney Disease (CKD):   •  Stage 1 with normal or high GFR (GFR > 90 mL/min/1 73 square meters)  •  Stage 2 Mild CKD (GFR = 60-89 mL/min/1 73 square meters)  •  Stage 3A Moderate CKD (GFR = 45-59 mL/min/1 73 square meters)  •  Stage 3B Moderate CKD (GFR = 30-44 mL/min/1 73 square meters)  •  Stage 4 Severe CKD (GFR = 15-29 mL/min/1 73 square meters)  •  Stage 5 End Stage CKD (GFR <15 mL/min/1 73 square meters)  Note: GFR calculation is accurate only with a steady state creatinine    Lipase [[id number]]  (Abnormal) Collected: 03/23/23 1018    Lab Status: Final result Specimen: Blood from Arm, Right Updated: 03/23/23 1051     Lipase 103 u/L     CBC and differential [[id number]]  (Abnormal) Collected: 03/23/23 1018    Lab Status: Final result Specimen: Blood from Arm, Right Updated: 03/23/23 1030     WBC 10 33 Thousand/uL      RBC 5 52 Million/uL      Hemoglobin 16 9 g/dL      Hematocrit 47 7 %      MCV 86 fL      MCH 30 6 pg      MCHC 35 4 g/dL      RDW 11 7 %      MPV 11 2 fL      Platelets 815 Thousands/uL      nRBC 0 /100 WBCs      Neutrophils Relative 78 %      Immat GRANS % 1 %      Lymphocytes Relative 14 %      Monocytes Relative 4 %      Eosinophils Relative 3 %      Basophils Relative 0 %      Neutrophils Absolute 8 02 Thousands/µL      Immature Grans Absolute 0 05 Thousand/uL Lymphocytes Absolute 1 47 Thousands/µL      Monocytes Absolute 0 45 Thousand/µL      Eosinophils Absolute 0 31 Thousand/µL      Basophils Absolute 0 03 Thousands/µL                  US right upper quadrant   Final Result by Vickie Saxena MD (03/23 1147)      Pancreas not visualized  Hepatic steatosis  Remainder of the examination is normal       Workstation performed: [id number]                    Procedures  Procedures         ED Course  ED Course as of 03/28/23 1856   Thu Mar 23, 2023   1231 Er md note- pt was told that bs was borderline aPPROX 3 YRS GO- IS CURRENTLY NOT ON  Any diabetic medication    1241 - ER MD NOTE- PT- RE-EVALUATED --  MADE AWARE OF LABS ABD U/S-ECG AND ELEVATED BS -- WILL NEED  ER PERIOD OF OBS FOR ADDITIONAL INTERVENTIONS- TESTING- HYDRATION --    1707 - ER MD NOTE- ALL D/C INSTRUCTIONS EXPLAINED TO PT AND REASONS WHEN TO RETURN TO  THE ER- HE VERBALIZES UNDERSTANDING                                             Medical Decision Making  Pt with upper abd pain with what sounds like possible gerd/pud--  No gi bleeding by hx--  highhly doubt any cardiac/pulmonary cause -- pt with need labs/ eg and abd u/s- and re-eval -- for possible gb disease/pancreatitis     Hyperglycemia due to type 2 diabetes mellitus ([facility] [address] ): acute illness or injury that poses a threat to life or bodily functions     Details: acute - pt will need ivf's adn insulin adn d/c with oral dm mds with endocrine followup   Primary hypertension: acute illness or injury that poses a threat to life or bodily functions     Details: acote- for pt- pt will need acute tx and followup   Upper abdominal pain: acute illness or injury     Details: p see abobe- pt will need er wokup   Amount and/or Complexity of Data Reviewed  External Data Reviewed: labs, radiology, ECG and notes  Details: all previous testing reviewd by er md   Labs: ordered  Decision-making details documented in ED Course       Details: all reviewd by er md   Radiology: ordered and independent interpretation performed  Details: all reviewed by er md   ECG/medicine tests: ordered and independent interpretation performed  Details: all reviewd by er md   Discussion of management or test interpretation with external provider(s): High degree of er md thought complexity and workup- see chart and h and p above     Risk  OTC drugs  Prescription drug management  Decision regarding hospitalization  Disposition  Final diagnoses:   Upper abdominal pain   Hyperglycemia due to type 2 diabetes mellitus ([facility] [address] )   Primary hypertension     Time reflects when diagnosis was documented in both MDM as applicable and the Disposition within this note     Time User Action Codes Description Comment    3/23/2023  4:40 PM Robert Ly Add [R10 10] Upper abdominal pain     3/23/2023  4:41 PM Kenisha Cluster D Add [E11 65] Hyperglycemia due to type 2 diabetes mellitus ([facility] [address] )     3/23/2023  4:41 PM Robert Ly Add [I10] Primary hypertension       ED Disposition     ED Disposition   Discharge    Condition   Stable    Date/Time   Thu Mar 23, 2023  4:40 PM    Comment   Sonia Lim discharge to home/self care                 Follow-up Information     Follow up With Specialties Details Why Contact Info Additional [address] Internal Medicine Call   [address] [facility] [id number]  [address], [address] [facility]    [address] [facility] Call in 1 day  [address] [facility] [address] [phone] [address] [facility] [place], [address] [place], [place], [address] [facility]          Discharge Medication List as of 3/23/2023  4:56 PM      START taking these medications    Details amLODIPine (NORVASC) 5 mg tablet Take 1 tablet (5 mg total) by mouth daily for 30 doses, Starting Thu 3/23/2023, Until Sat 4/22/2023, Normal      ondansetron (Zofran) 4 mg tablet Take 1 tablet (4 mg total) by mouth every 8 (eight) hours as needed for nausea or vomiting for up to 10 doses ZOFRAN ODT 2 TABLETS DISSOLVE IN MOUTH EVERY 8 HRS AS NEEDED FOR NAUSEA, Starting Thu 3/23/2023, Normal      !! metFORMIN (GLUCOPHAGE) 500 mg tablet Take 1 tablet (500 mg total) by mouth 2 (two) times a day with meals for 60 doses, Starting Thu 3/23/2023, Until Sat 4/22/2023, Normal       !! - Potential duplicate medications found  Please discuss with provider  CONTINUE these medications which have NOT CHANGED    Details   !! metFORMIN (GLUCOPHAGE) 500 mg tablet Starting Wed 8/26/2020, Historical Med       !! - Potential duplicate medications found  Please discuss with provider  No discharge procedures on file      PDMP Review       Value Time User    PDMP Reviewed  Yes 9/8/2020  8:41 AM Paula Pastor PA-C          ED Provider  Electronically Signed by           Celia Le MD  03/28/23 7886

## 2023-04-27 ENCOUNTER — OFFICE VISIT (OUTPATIENT)
Dept: INTERNAL MEDICINE CLINIC | Facility: CLINIC | Age: 38
End: 2023-04-27

## 2023-04-27 VITALS
WEIGHT: 315 LBS | BODY MASS INDEX: 39.17 KG/M2 | HEIGHT: 75 IN | OXYGEN SATURATION: 98 % | SYSTOLIC BLOOD PRESSURE: 114 MMHG | HEART RATE: 98 BPM | DIASTOLIC BLOOD PRESSURE: 80 MMHG

## 2023-04-27 DIAGNOSIS — K21.9 GASTROESOPHAGEAL REFLUX DISEASE, UNSPECIFIED WHETHER ESOPHAGITIS PRESENT: ICD-10-CM

## 2023-04-27 DIAGNOSIS — I10 ESSENTIAL HYPERTENSION: ICD-10-CM

## 2023-04-27 DIAGNOSIS — E11.65 TYPE 2 DIABETES MELLITUS WITH HYPERGLYCEMIA, UNSPECIFIED WHETHER LONG TERM INSULIN USE (HCC): Primary | ICD-10-CM

## 2023-04-27 PROBLEM — E11.9 NEW ONSET TYPE 2 DIABETES MELLITUS (HCC): Status: RESOLVED | Noted: 2023-03-26 | Resolved: 2023-04-27

## 2023-04-27 RX ORDER — FAMOTIDINE 20 MG/1
TABLET, FILM COATED ORAL
Qty: 30 TABLET | Refills: 0 | Status: SHIPPED | OUTPATIENT
Start: 2023-04-27

## 2023-04-27 NOTE — ASSESSMENT & PLAN NOTE
-Reflux symptoms have completely resolved after 4 weeks of treatment  -Plan to give the patient a trial off of omeprazole  -I have instructed him to take Pepcid 20 mg daily for 1 week then stop

## 2023-04-27 NOTE — PATIENT INSTRUCTIONS
Check a fasting blood sugar in addition to a reading 2 hours after a meal   Discontinue Prilosec  Please start Pepcid 20 mg daily for 1 week and then stop  Please perform some type of cardiovascular activity for at least 30 minutes 4-5 times a week    [employer] blood pressure machine with cuff (upper arm cuff)

## 2023-04-27 NOTE — PROGRESS NOTES
Name: Edis Singh      : 1985      MRN: [id number]  Encounter Provider: Jon Wu MD  Encounter Date: 2023   [facility] [address]     1  Type 2 diabetes mellitus with hyperglycemia, unspecified whether long term insulin use (HCC)  Assessment & Plan:  -Patient reports his blood sugars have improved averaging(140-150 on glucometer)  -Continue current insulin regimen  -Patient given labs by endocrinology to be drawn prior to his next appointment in July    Lab Results   Component Value Date    HGBA1C 10 6 (H) 2023         2  Essential hypertension  Assessment & Plan:  -Blood pressure well controlled  -Continue amlodipine 5 mg daily      3  Gastroesophageal reflux disease, unspecified whether esophagitis present  Assessment & Plan:  -Reflux symptoms have completely resolved after 4 weeks of treatment  -Plan to give the patient a trial off of omeprazole  -I have instructed him to take Pepcid 20 mg daily for 1 week then stop    Orders:  -     famotidine (PEPCID) 20 mg tablet; Take daily for next week then STOP      BMI Counseling: Body mass index is 39 75 kg/m²  The BMI is above normal  Nutrition recommendations include increasing intake of lean protein  Exercise recommendations include exercising 3-5 times per week  Subjective      HPI  The patient presents today for chronic follow-up  His history is most notable for type 2 diabetes mellitus and GERD  Since his last visit he reports his blood sugars have improved and that on average he is getting readings around 140-150  He denies any episodes of hypoglycemia  Regarding his GERD and epigastric discomfort, he states after 10 days of treatment with omeprazole his symptoms completely resolved  He reports his appetite is back to normal     For hypertension he was started on amlodipine    He is tolerating this dose well and denies any dizziness, chest pain, shortness "of breath or palpitations      ROS as per HPI    Current Outpatient Medications on File Prior to Visit   Medication Sig   • Blood Glucose Monitoring Suppl (OneTouch Verio) w/Device KIT Use 2 (two) times a day   • glucose blood (OneTouch Verio) test strip Use 1 each 2 (two) times a day Use as instructed   • Insulin Glargine Solostar (Basaglar KwikPen) 100 UNIT/ML SOPN Start 15 units at bedtime   • Insulin Pen Needle (BD Pen Needle Princess U/F) 32G X 4 MM MISC Inject once daily   • metFORMIN (GLUCOPHAGE-XR) 500 mg 24 hr tablet Take one tablet with breakfast and 2 tabs with dinner   • ondansetron (Zofran) 4 mg tablet Take 1 tablet (4 mg total) by mouth every 8 (eight) hours as needed for nausea or vomiting for up to 10 doses ZOFRAN ODT 2 TABLETS DISSOLVE IN MOUTH EVERY 8 HRS AS NEEDED FOR NAUSEA   • OneTouch Delica Lancets 76L MISC Use 2 (two) times a day   • [DISCONTINUED] omeprazole (PriLOSEC) 20 mg delayed release capsule Take 1 capsule (20 mg total) by mouth daily   • amLODIPine (NORVASC) 5 mg tablet Take 1 tablet (5 mg total) by mouth daily for 30 doses       Objective     /80   Pulse 98   Ht 6' 3\" (1 905 m)   Wt (!) 144 kg (318 lb)   SpO2 98%   BMI 39 75 kg/m²     BP Readings from Last 3 Encounters:   04/27/23 114/80   04/18/23 140/88   03/27/23 132/78      Physical Exam    General: NAD, Alert and oriented x3   HEENT: NCAT, EOMI, normal conjunctiva  Cardiovascular: RRR, normal S1 and S2, no m/r/g  Pulmonary: Normal respiratory effort, no wheezes, rales or rhonchi  MSK: Normal bulk and tone  Neuro: Non-focal, ambulating without difficulty, non-antalgic gait  Extremities: No lower extremity edema  Skin: Normal skin color, no rashes     Conrado Gill MD  "

## 2023-04-27 NOTE — ASSESSMENT & PLAN NOTE
-Patient reports his blood sugars have improved averaging(140-150 on glucometer)  -Continue current insulin regimen  -Patient given labs by endocrinology to be drawn prior to his next appointment in July    Lab Results   Component Value Date    HGBA1C 10 6 (H) 03/23/2023

## 2023-05-02 ENCOUNTER — TELEPHONE (OUTPATIENT)
Dept: ENDOCRINOLOGY | Facility: CLINIC | Age: 38
End: 2023-05-02

## 2023-05-02 DIAGNOSIS — E11.65 TYPE 2 DIABETES MELLITUS WITH HYPERGLYCEMIA, UNSPECIFIED WHETHER LONG TERM INSULIN USE (HCC): Primary | ICD-10-CM

## 2023-05-02 RX ORDER — INSULIN GLARGINE 100 [IU]/ML
15 INJECTION, SOLUTION SUBCUTANEOUS
Qty: 15 ML | Refills: 0 | Status: SHIPPED | OUTPATIENT
Start: 2023-05-02

## 2023-05-02 NOTE — TELEPHONE ENCOUNTER
Pt l/m stating he was not able to p/u insulin Rx from pharmacy  Called [facility] to find out what's going on  She stated Basaglar requires PA  Insurance prefers Lantus  Is this ok to switch?

## 2023-05-25 PROBLEM — Z11.59 NEED FOR HEPATITIS C SCREENING TEST: Status: RESOLVED | Noted: 2023-03-26 | Resolved: 2023-05-25

## 2023-07-06 ENCOUNTER — OFFICE VISIT (OUTPATIENT)
Dept: INTERNAL MEDICINE CLINIC | Facility: CLINIC | Age: 38
End: 2023-07-06
Payer: MEDICARE

## 2023-07-06 VITALS
WEIGHT: 315 LBS | HEART RATE: 88 BPM | BODY MASS INDEX: 39.17 KG/M2 | DIASTOLIC BLOOD PRESSURE: 88 MMHG | OXYGEN SATURATION: 98 % | HEIGHT: 75 IN | SYSTOLIC BLOOD PRESSURE: 132 MMHG

## 2023-07-06 DIAGNOSIS — L30.9 DERMATITIS: Primary | ICD-10-CM

## 2023-07-06 PROCEDURE — 99213 OFFICE O/P EST LOW 20 MIN: CPT | Performed by: INTERNAL MEDICINE

## 2023-07-06 RX ORDER — TRIAMCINOLONE ACETONIDE 1 MG/G
CREAM TOPICAL 2 TIMES DAILY
Qty: 453.6 G | Refills: 0 | Status: SHIPPED | OUTPATIENT
Start: 2023-07-06 | End: 2023-07-07

## 2023-07-06 RX ORDER — HYDROXYZINE HYDROCHLORIDE 25 MG/1
25 TABLET, FILM COATED ORAL 3 TIMES DAILY PRN
Qty: 30 TABLET | Refills: 0 | Status: SHIPPED | OUTPATIENT
Start: 2023-07-06

## 2023-07-06 NOTE — ASSESSMENT & PLAN NOTE
-Possibly related to drug reaction. Patient notes rash appears red around time he started so we will start insulin. Rash potentially also due to diabetic dermopathy in the setting of poorly controlled diabetes. -Treat with triamcinolone BID x 7 days.   -A script for Atarax has been given to the patient for itching.

## 2023-07-06 NOTE — Clinical Note
Renny Vo,  Thank you for seeing Jacey Reyes in consultation. I saw him today and he's complaining of a rash that started on his abdomen then subsequently spread. The rash is fairly faint and maculopapular. He states it started around the time he went on Solostar. I've given him a jar of Triamcinolone cream to apply twice a day to the affected areas. It looks like he has f/u with Jeimy next week. Would you like him to continue his Solostar for now until he's seen?   Thanks, Conrado

## 2023-07-06 NOTE — PROGRESS NOTES
Name: Bhaskar Jeong      : 1985      MRN: [id number]  Encounter Provider: Giselle Sepulveda MD  Encounter Date: 2023   Encounter department: [facility] [place] [id number]. Dermatitis  Assessment & Plan:  -Possibly related to drug reaction. Patient notes rash appears red around time he started so we will start insulin. Rash potentially also due to diabetic dermopathy in the setting of poorly controlled diabetes. -Treat with triamcinolone BID x 7 days.   -A script for Atarax has been given to the patient for itching. Orders:  -     hydrOXYzine HCL (ATARAX) 25 mg tablet; Take 1 tablet (25 mg total) by mouth 3 (three) times a day as needed for itching  -     triamcinolone (KENALOG) 0.1 % cream; Apply topically 2 (two) times a day for 7 days           Subjective      HPI   Patient presents today as an acute visit complaining of a rash. He reports his rash started approximately 6 weeks ago. The rash initially appeared on his abdomen and then subsequently appeared on his arms and right thigh. He reports the rash is very pruritic. Initially he thought it was due to poison ivy so he used Benadryl and hydrocortisone cream with minimal improvement. He is concerned the rash may be due to a reaction from his insulin.       ROS as per [facility]    Current Outpatient Medications on File Prior to Visit   Medication Sig   • Blood Glucose Monitoring Suppl (OneTouch Verio) w/Device KIT Use 2 (two) times a day   • famotidine (PEPCID) 20 mg tablet Take daily for next week then STOP   • glucose blood (OneTouch Verio) test strip Use 1 each 2 (two) times a day Use as instructed   • Insulin Pen Needle (BD Pen Needle Princess U/F) 32G X 4 MM MISC Inject once daily   • Lantus SoloStar 100 units/mL SOPN Inject 0.15 mL (15 Units total) under the skin daily at bedtime   • metFORMIN (GLUCOPHAGE-XR) 500 mg 24 hr tablet Take one tablet with breakfast and 2 tabs with dinner   • ondansetron (Zofran) 4 mg tablet Take 1 tablet (4 mg total) by mouth every 8 (eight) hours as needed for nausea or vomiting for up to 10 doses ZOFRAN ODT 2 TABLETS DISSOLVE IN MOUTH EVERY 8 HRS AS NEEDED FOR NAUSEA   • OneTouch Delica Lancets 62U MISC Use 2 (two) times a day   • amLODIPine (NORVASC) 5 mg tablet Take 1 tablet (5 mg total) by mouth daily for 30 doses       Objective     /88   Pulse 88   Ht 6' 3" (1.905 m)   Wt (!) 148 kg (326 lb 6.4 oz)   SpO2 98%   BMI 40.80 kg/m²     BP Readings from Last 3 Encounters:   07/06/23 132/88   04/27/23 114/80   04/18/23 140/88        Wt Readings from Last 3 Encounters:   07/06/23 (!) 148 kg (326 lb 6.4 oz)   04/27/23 (!) 144 kg (318 lb)   04/18/23 (!) 142 kg (314 lb)       Physical Exam    General: NAD, Alert and oriented x3   HEENT: NCAT, EOMI, normal conjunctiva  Cardiovascular: RRR, normal S1 and S2, no m/r/g  Pulmonary: Normal respiratory effort, no wheezes, rales or rhonchit  Extremities: No lower extremity edema  Skin: Faintly erythematous maculopapular rash involving abdomen, right lower flank, bilateral forearm, and right thigh    Argelia Raymundo MD

## 2023-07-11 ENCOUNTER — LAB (OUTPATIENT)
Dept: LAB | Facility: AMBULARY SURGERY CENTER | Age: 38
End: 2023-07-11
Payer: MEDICARE

## 2023-07-11 ENCOUNTER — APPOINTMENT (OUTPATIENT)
Dept: LAB | Facility: AMBULARY SURGERY CENTER | Age: 38
End: 2023-07-11
Payer: MEDICARE

## 2023-07-11 DIAGNOSIS — E11.65 TYPE 2 DIABETES MELLITUS WITH HYPERGLYCEMIA, WITHOUT LONG-TERM CURRENT USE OF INSULIN (HCC): ICD-10-CM

## 2023-07-11 LAB
ANION GAP SERPL CALCULATED.3IONS-SCNC: 3 MMOL/L
BUN SERPL-MCNC: 18 MG/DL (ref 5–25)
CALCIUM SERPL-MCNC: 8.9 MG/DL (ref 8.3–10.1)
CHLORIDE SERPL-SCNC: 107 MMOL/L (ref 96–108)
CHOLEST SERPL-MCNC: 213 MG/DL
CO2 SERPL-SCNC: 26 MMOL/L (ref 21–32)
CREAT SERPL-MCNC: 0.83 MG/DL (ref 0.6–1.3)
CREAT UR-MCNC: 110 MG/DL
GFR SERPL CREATININE-BSD FRML MDRD: 112 ML/MIN/1.73SQ M
GLUCOSE P FAST SERPL-MCNC: 245 MG/DL (ref 65–99)
HDLC SERPL-MCNC: 39 MG/DL
LDLC SERPL CALC-MCNC: 122 MG/DL (ref 0–100)
MICROALBUMIN UR-MCNC: 8.4 MG/L (ref 0–20)
MICROALBUMIN/CREAT 24H UR: 8 MG/G CREATININE (ref 0–30)
NONHDLC SERPL-MCNC: 174 MG/DL
POTASSIUM SERPL-SCNC: 3.9 MMOL/L (ref 3.5–5.3)
SODIUM SERPL-SCNC: 136 MMOL/L (ref 135–147)
TRIGL SERPL-MCNC: 261 MG/DL

## 2023-07-11 PROCEDURE — 80061 LIPID PANEL: CPT

## 2023-07-11 PROCEDURE — 36415 COLL VENOUS BLD VENIPUNCTURE: CPT

## 2023-07-11 PROCEDURE — 82570 ASSAY OF URINE CREATININE: CPT

## 2023-07-11 PROCEDURE — 82043 UR ALBUMIN QUANTITATIVE: CPT

## 2023-07-11 PROCEDURE — 80048 BASIC METABOLIC PNL TOTAL CA: CPT

## 2023-07-11 PROCEDURE — 83036 HEMOGLOBIN GLYCOSYLATED A1C: CPT

## 2023-07-12 LAB
EST. AVERAGE GLUCOSE BLD GHB EST-MCNC: 206 MG/DL
HBA1C MFR BLD: 8.8 %

## 2023-07-12 NOTE — RESULT ENCOUNTER NOTE
This patient has upcoming appointment, no urgent lab results, will review with patient at that time.

## 2023-07-13 ENCOUNTER — OFFICE VISIT (OUTPATIENT)
Dept: ENDOCRINOLOGY | Facility: CLINIC | Age: 38
End: 2023-07-13
Payer: MEDICARE

## 2023-07-13 ENCOUNTER — DOCUMENTATION (OUTPATIENT)
Dept: ENDOCRINOLOGY | Facility: CLINIC | Age: 38
End: 2023-07-13

## 2023-07-13 VITALS
OXYGEN SATURATION: 99 % | BODY MASS INDEX: 39.17 KG/M2 | SYSTOLIC BLOOD PRESSURE: 118 MMHG | HEART RATE: 91 BPM | HEIGHT: 75 IN | WEIGHT: 315 LBS | DIASTOLIC BLOOD PRESSURE: 80 MMHG

## 2023-07-13 DIAGNOSIS — E11.65 TYPE 2 DIABETES MELLITUS WITH HYPERGLYCEMIA, WITH LONG-TERM CURRENT USE OF INSULIN (HCC): Primary | ICD-10-CM

## 2023-07-13 DIAGNOSIS — I10 ESSENTIAL HYPERTENSION: ICD-10-CM

## 2023-07-13 DIAGNOSIS — E11.65 TYPE 2 DIABETES MELLITUS WITH HYPERGLYCEMIA, WITH LONG-TERM CURRENT USE OF INSULIN (HCC): ICD-10-CM

## 2023-07-13 DIAGNOSIS — Z79.4 TYPE 2 DIABETES MELLITUS WITH HYPERGLYCEMIA, WITH LONG-TERM CURRENT USE OF INSULIN (HCC): ICD-10-CM

## 2023-07-13 DIAGNOSIS — E78.2 MIXED HYPERLIPIDEMIA: ICD-10-CM

## 2023-07-13 DIAGNOSIS — Z79.4 TYPE 2 DIABETES MELLITUS WITH HYPERGLYCEMIA, WITH LONG-TERM CURRENT USE OF INSULIN (HCC): Primary | ICD-10-CM

## 2023-07-13 PROCEDURE — 99214 OFFICE O/P EST MOD 30 MIN: CPT | Performed by: PHYSICIAN ASSISTANT

## 2023-07-13 RX ORDER — INSULIN GLARGINE 100 [IU]/ML
20 INJECTION, SOLUTION SUBCUTANEOUS
Qty: 15 ML | Refills: 0
Start: 2023-07-13 | End: 2023-07-13

## 2023-07-13 RX ORDER — INSULIN DEGLUDEC INJECTION 100 U/ML
20 INJECTION, SOLUTION SUBCUTANEOUS
Qty: 3 ML | Refills: 0 | Status: SHIPPED | COMMUNITY
Start: 2023-07-13 | End: 2023-07-21 | Stop reason: SDUPTHER

## 2023-07-13 RX ORDER — INSULIN DEGLUDEC INJECTION 100 U/ML
20 INJECTION, SOLUTION SUBCUTANEOUS
Qty: 15 ML | Refills: 1 | Status: SHIPPED | OUTPATIENT
Start: 2023-07-13 | End: 2023-07-13 | Stop reason: SDUPTHER

## 2023-07-13 RX ORDER — DULAGLUTIDE 0.75 MG/.5ML
0.75 INJECTION, SOLUTION SUBCUTANEOUS
Qty: 2 ML | Refills: 1 | Status: SHIPPED | OUTPATIENT
Start: 2023-07-13

## 2023-07-13 NOTE — PROGRESS NOTES
Patient Progress Note      CC: DM      Referring Provider  Aristeo Olivares, [address] [facility],  [address]     History of Present Illness:   Mariposa Goldman is a 40 y.o. male with a history of type 2 diabetes with long term use of insulin. Diabetes course has been stable. He was in the ER in March 2023 for abdominal pain at which time he became aware of his diagnosis of DM. Denies recent severe hypoglycemic or severe hyperglycemic episodes. Denies any issues with his current regimen. Home glucose monitoring: are performed regularly, 3 times a day     Home blood glucose readings:   Before breakfast: 176-217 mg/dl  Before lunch: N/A  Before dinner: 188-288 mg/dl, variable  Bedtime: 302-451 mg/dl     He states with the initial sample insulin he was given he did well, his BG readings were in the low to mid 100s mg/dl. After he started using the Lantus, he developed an itchy rash. He has had this rash for 7-8 weeks and did see his PCP for it. Around that time he was also doing some yard work so he is not sure if the symptoms are due to possible contact dermatitis or the insulin, but the rash has persisted. Current regimen: Lantus 15 units QHS, metformin 500 mg in AM and 1000 mg in PM  compliant most of the time, denies any side effects from medications. Injects in: abdomen. Rotates sites: Yes  Hypoglycemic episodes: No, never  H/o of hypoglycemia causing hospitalization or Intervention such as glucagon injection  or ambulance call :  No  Hypoglycemia symptoms: never less than 70 mg/dl   Treatment of hypoglycemia:  Discussed treatment     Medic alert tag: recommended: Yes    Diabetes education: No  Diet: 2 meals per day, 0-1 snack per day. Timing of meals is predictable. Diabetic diet compliance:  noncompliant some of the time  Activity: Daily activity is predictable: Yes. He tries to walk / stay active 4-5 times a week. [facility]     Ophthamology: March 2023  Podiatry: April 2023    Not on ACE inhibitor/ARB  Not on statin   Thyroid disorders: No  History of pancreatitis: No    Patient Active Problem List   Diagnosis   • Patellar tendon rupture, left, initial encounter   • Acute lateral meniscus tear of left knee   • Patellar tendinosis   • Tobacco use   • S/P arthroscopy of left knee   • Patellofemoral pain syndrome of left knee   • Internal derangement of knee joint, left   • History of meniscectomy of left knee   • Patellofemoral arthritis of left knee   • Primary osteoarthritis of left knee   • Patellofemoral disorder of both knees   • Patella wanda   • Contusion of right knee   • Hepatic steatosis   • Essential hypertension   • Annual physical exam   • Gastroesophageal reflux disease   • Type 2 diabetes mellitus with hyperglycemia ([address])   • Dermatitis      Past Medical History:   Diagnosis Date   • Acute pancreatitis 3/26/2023   • Diabetes mellitus ([address]) 3/26/23   • Hypertension 3/26/23   • Obesity       Past Surgical History:   Procedure Laterality Date   • KNEE SURGERY     • MOUTH SURGERY      tongue   • NO PAST SURGERIES     • [place] ARTHRS KNE SURG W/MENISCECTOMY MED/LAT W/SHVG Left 09/08/2020    Procedure: KNEE ARTHROSCOPY PARTIAL LATERAL MENISCECTOMY;  Surgeon: Kaleb Tabares DO;  Location: [facility];  Service: Orthopedics      Family History   Problem Relation Age of Onset   • Hypertension Mother    • Hypertension Father    • Diabetes Father    • Diabetes type II Father      Social History     Tobacco Use   • Smoking status: Some Days     Packs/day: 0.25     Years: 15.00     Total pack years: 3.75     Types: Cigarettes     Start date: 11/11/2002     Passive exposure: Past   • Smokeless tobacco: Never   Substance Use Topics   • Alcohol use:  Yes     Alcohol/week: 10.0 standard drinks of alcohol     Types: 10 Cans of beer per week     Comment: socially     Allergies   Allergen Reactions   • Lantus [Insulin Glargine] Rash         Current Outpatient Medications:   •  amLODIPine (NORVASC) 5 mg tablet, Take 1 tablet (5 mg total) by mouth daily for 30 doses, Disp: 30 tablet, Rfl: 6  •  Blood Glucose Monitoring Suppl (OneTouch Verio) w/Device KIT, Use 2 (two) times a day, Disp: 1 kit, Rfl: 0  •  dulaglutide (Trulicity) 9.34 UC/2.7ZC injection, Inject 0.5 mL (0.75 mg total) under the skin every 7 days, Disp: 2 mL, Rfl: 1  •  glucose blood (OneTouch Verio) test strip, Use 1 each 2 (two) times a day Use as instructed, Disp: 200 strip, Rfl: 1  •  hydrOXYzine HCL (ATARAX) 25 mg tablet, Take 1 tablet (25 mg total) by mouth 3 (three) times a day as needed for itching, Disp: 30 tablet, Rfl: 0  •  Insulin Pen Needle (BD Pen Needle Princess U/F) 32G X 4 MM MISC, Inject once daily, Disp: 100 each, Rfl: 1  •  metFORMIN (GLUCOPHAGE-XR) 500 mg 24 hr tablet, Take one tablet with breakfast and 2 tabs with dinner, Disp: 270 tablet, Rfl: 1  •  mometasone (ELOCON) 0.1 % cream, Apply topically daily for 7 days, Disp: 120 g, Rfl: 0  •  OneTouch Delica Lancets 53I MISC, Use 2 (two) times a day, Disp: 200 each, Rfl: 1  •  famotidine (PEPCID) 20 mg tablet, Take daily for next week then STOP (Patient not taking: Reported on 7/13/2023), Disp: 30 tablet, Rfl: 0  •  insulin degludec Vanessa Goltz FlexTouch) 100 units/mL injection pen, Inject 20 Units under the skin daily at bedtime, Disp: 3 mL, Rfl: 0  •  ondansetron (Zofran) 4 mg tablet, Take 1 tablet (4 mg total) by mouth every 8 (eight) hours as needed for nausea or vomiting for up to 10 doses ZOFRAN ODT 2 TABLETS DISSOLVE IN MOUTH EVERY 8 HRS AS NEEDED FOR NAUSEA (Patient not taking: Reported on 7/13/2023), Disp: 10 tablet, Rfl: 3  Review of Systems   Constitutional: Negative for activity change, appetite change, fatigue and unexpected weight change. HENT: Negative for trouble swallowing. Eyes: Negative for visual disturbance. Respiratory: Negative for shortness of breath. Cardiovascular: Negative for chest pain and palpitations. Gastrointestinal: Negative for constipation and diarrhea. Endocrine: Negative for polydipsia and polyuria. Musculoskeletal: Negative. Skin: Positive for rash. Neurological: Negative for numbness. Psychiatric/Behavioral: Negative. Physical Exam:  Body mass index is 41.27 kg/m². /80 (BP Location: Left arm, Patient Position: Sitting, Cuff Size: Large)   Pulse 91   Ht 6' 3" (1.905 m)   Wt (!) 150 kg (330 lb 3.2 oz)   SpO2 99%   BMI 41.27 kg/m²    Wt Readings from Last 3 Encounters:   07/13/23 (!) 150 kg (330 lb 3.2 oz)   07/06/23 (!) 148 kg (326 lb 6.4 oz)   04/27/23 (!) 144 kg (318 lb)       Physical Exam  Vitals and nursing note reviewed. Constitutional:       Appearance: He is well-developed. HENT:      Head: Normocephalic. Eyes:      General: No scleral icterus. Pupils: Pupils are equal, round, and reactive to light. Neck:      Thyroid: No thyromegaly. Cardiovascular:      Rate and Rhythm: Normal rate and regular rhythm. Pulses:           Radial pulses are 2+ on the right side and 2+ on the left side. Heart sounds: No murmur heard. Pulmonary:      Effort: Pulmonary effort is normal. No respiratory distress. Breath sounds: Normal breath sounds. No wheezing. Musculoskeletal:      Cervical back: Neck supple. Skin:     General: Skin is warm and dry. Findings: Rash (diffuse erythematous maculopapular rash) present. Neurological:      Mental Status: He is alert. Patient's shoes and socks were not removed.           Labs:   Lab Results   Component Value Date    HGBA1C 8.8 (H) 07/11/2023     Lab Results   Component Value Date    CALCIUM 8.9 07/11/2023    K 3.9 07/11/2023    CO2 26 07/11/2023     07/11/2023    BUN 18 07/11/2023    CREATININE 0.83 07/11/2023     No results found for: "Princess Ling", "Jacqualin Latch"  eGFR   Date Value Ref Range Status   07/11/2023 112 ml/min/1.73sq m Final     No components found for: "MALBCRER"  Lab Results   Component Value Date    HDL 39 (L) 07/11/2023    TRIG 261 (H) 07/11/2023     Lab Results   Component Value Date    ALT 24 03/23/2023    AST 12 (L) 03/23/2023    ALKPHOS 69 03/23/2023     No results found for: "CXT1IQZIYXCG", "TSH", "M8VXTLV", "M9OHQSU", "THYROIDAB"          Plan:    Diagnoses and all orders for this visit:    Type 2 diabetes mellitus with hyperglycemia, with long-term current use of insulin ([address])  HGA1C 8.8%. Improved. Treatment regimen: switch Lantus to [place] due to reports of a rash since using Lantus. Discussed if rash persists despite change in insulin, he should follow-up with PCP/dermatology. Take Tresiba 20 units QHS. Continue metformin. Start Trulicity 7.62 mg weekly. Adi side effects, safety/efficacy. No personal history of pancreatitis or family history of medullary thyroid cancer. Discussed intensive insulin regimen does increase risk for hypoglycemia. Episodes of hypoglycemia can lead to permanent disability and death. Discussed risks/complications associated with uncontrolled diabetes. Advised to adhere to diabetic diet, and recommended staying active/exercising routinely as tolerated. Keep carbohydrates consistent to limit blood glucose fluctuations. Advised to call if blood sugars less than 70 mg/dl or over 300 mg/dl. Check blood glucose 2+ times a day  Discussed symptoms and treatment of hypoglycemia. Referred to diabetes/nutrition education. Recommended routine follow-up with podiatry and ophthalmology. Send log in 2 weeks. Ordered blood work to complete prior to next visit. -     Hemoglobin A1C; Future  -     Basic metabolic panel; Future  -     dulaglutide (Trulicity) 0.01 UI/6.6HY injection; Inject 0.5 mL (0.75 mg total) under the skin every 7 days  -     Discontinue: Lantus SoloStar 100 units/mL SOPN; Inject 0.2 mL (20 Units total) under the skin daily at bedtime  -     Discontinue: insulin degludec [place] FlexTouch) 100 units/mL injection pen;  Inject 20 Units under the skin daily at bedtime    Essential hypertension  Blood pressure adequately controlled, continue current treatment    Mixed hyperlipidemia   and triglycerides 261  Reduce intake of saturated fats  Recommended healthy diet and routine exercise as tolerated  Not on statin therapy  Discussed with the patient diagnosis and treatment and all questions fully answered. He will call me if any problems arise. Counseled patient on diagnostic results, prognosis, risk and benefit of treatment options, instruction for management, importance of treatment compliance, risk factor reduction and impressions.       Jeimy Costa PA-C

## 2023-07-13 NOTE — PATIENT INSTRUCTIONS
Hypoglycemia instructions   [facility]  7/13/2023  [id number]    Low Blood Sugar    Steps to treat low blood sugar. 1. Test blood sugar if you have symptoms of low blood sugar:   Low Blood Sugar Symptoms:  o Sweaty  o Dizzy  o Rapid heartbeat  o Shaky  o Bad mood  o Hungry      2. Treat blood sugar less than 70 with 15 grams of fast-acting carbohydrate:   Examples of 15 grams Fast-Acting Carbohydrate:  o 4 oz juice  o 4 oz regular soda  o 3-4 glucose tablets (chew)  o 3-4 hard candies (chew)          3. Wait 15 minutes and test your blood sugar again     4.  If blood sugar is less than 100, repeat steps 2-3.    5. When your blood sugar is 100 or more, eat a snack if it will be longer than one hour until your next meal. The snack should be 15 grams of carbohydrate and a protein:   Examples of snacks:  o ½ sandwich  o 6 crackers with cheese  o Piece of fruit with cheese or peanut butter  o 6 crackers with peanut butter

## 2023-07-21 DIAGNOSIS — E11.65 TYPE 2 DIABETES MELLITUS WITH HYPERGLYCEMIA, WITH LONG-TERM CURRENT USE OF INSULIN (HCC): ICD-10-CM

## 2023-07-21 DIAGNOSIS — Z79.4 TYPE 2 DIABETES MELLITUS WITH HYPERGLYCEMIA, WITH LONG-TERM CURRENT USE OF INSULIN (HCC): ICD-10-CM

## 2023-07-21 RX ORDER — INSULIN DEGLUDEC INJECTION 100 U/ML
20 INJECTION, SOLUTION SUBCUTANEOUS
Qty: 15 ML | Refills: 1 | Status: SHIPPED | OUTPATIENT
Start: 2023-07-21

## 2023-07-28 DIAGNOSIS — E11.65 TYPE 2 DIABETES MELLITUS WITH HYPERGLYCEMIA, UNSPECIFIED WHETHER LONG TERM INSULIN USE (HCC): ICD-10-CM

## 2023-07-28 DIAGNOSIS — E11.65 HYPERGLYCEMIA DUE TO TYPE 2 DIABETES MELLITUS (HCC): ICD-10-CM

## 2023-07-31 RX ORDER — INSULIN GLARGINE 100 [IU]/ML
15 INJECTION, SOLUTION SUBCUTANEOUS
OUTPATIENT
Start: 2023-07-31

## 2023-07-31 RX ORDER — METFORMIN HYDROCHLORIDE 500 MG/1
TABLET, EXTENDED RELEASE ORAL
Qty: 270 TABLET | Refills: 1 | Status: SHIPPED | OUTPATIENT
Start: 2023-07-31

## 2023-08-01 ENCOUNTER — TELEPHONE (OUTPATIENT)
Dept: ENDOCRINOLOGY | Facility: CLINIC | Age: 38
End: 2023-08-01

## 2023-08-01 NOTE — TELEPHONE ENCOUNTER
Pt called  He is out of tresiba    He was on lantus was taken off because he got hives. We started him on tesiba  The pharmacy is saying they dont have the script that was called in . Can we call the pharmacy and see whats wrong? I didn't see a denial.  They say they dont have the rx. Please let pt know.

## 2023-08-02 ENCOUNTER — TELEPHONE (OUTPATIENT)
Dept: ENDOCRINOLOGY | Facility: CLINIC | Age: 38
End: 2023-08-02

## 2023-08-02 DIAGNOSIS — E11.65 TYPE 2 DIABETES MELLITUS WITH HYPERGLYCEMIA, WITH LONG-TERM CURRENT USE OF INSULIN (HCC): ICD-10-CM

## 2023-08-02 DIAGNOSIS — Z79.4 TYPE 2 DIABETES MELLITUS WITH HYPERGLYCEMIA, WITH LONG-TERM CURRENT USE OF INSULIN (HCC): ICD-10-CM

## 2023-08-02 RX ORDER — INSULIN DEGLUDEC INJECTION 100 U/ML
20 INJECTION, SOLUTION SUBCUTANEOUS
Qty: 15 ML | Refills: 1 | Status: SHIPPED | OUTPATIENT
Start: 2023-08-02

## 2023-08-23 ENCOUNTER — TELEPHONE (OUTPATIENT)
Dept: ENDOCRINOLOGY | Facility: CLINIC | Age: 38
End: 2023-08-23

## 2023-08-23 DIAGNOSIS — Z79.4 TYPE 2 DIABETES MELLITUS WITH HYPERGLYCEMIA, WITH LONG-TERM CURRENT USE OF INSULIN (HCC): Primary | ICD-10-CM

## 2023-08-23 DIAGNOSIS — E11.65 TYPE 2 DIABETES MELLITUS WITH HYPERGLYCEMIA, WITH LONG-TERM CURRENT USE OF INSULIN (HCC): Primary | ICD-10-CM

## 2023-08-23 RX ORDER — INSULIN DETEMIR 100 [IU]/ML
20 INJECTION, SOLUTION SUBCUTANEOUS
Qty: 15 ML | Refills: 1 | Status: SHIPPED | OUTPATIENT
Start: 2023-08-23

## 2023-08-23 NOTE — TELEPHONE ENCOUNTER
Received a prior auth for Tresiba at the [facility] office. When I looked it was done already on 8/2. There is a denial in his chart for the Tresiba from 8/3. They want him to try Levemir or Toujeo. He is ok with trying it if you are. He is almost out of insulin. Please send if it's ok.

## 2023-08-23 NOTE — TELEPHONE ENCOUNTER
I switched him to Levemir.  We can't use Toujeo because that is also insulin glargine and he has had a rash with glargine (Lantus) in the past.

## 2023-09-08 DIAGNOSIS — E11.65 HYPERGLYCEMIA DUE TO TYPE 2 DIABETES MELLITUS (HCC): ICD-10-CM

## 2023-09-08 DIAGNOSIS — E11.65 TYPE 2 DIABETES MELLITUS WITH HYPERGLYCEMIA, WITHOUT LONG-TERM CURRENT USE OF INSULIN (HCC): ICD-10-CM

## 2023-09-08 RX ORDER — LANCETS 33 GAUGE
EACH MISCELLANEOUS
Qty: 200 EACH | Refills: 1 | Status: SHIPPED | OUTPATIENT
Start: 2023-09-08

## 2023-09-10 DIAGNOSIS — E11.65 TYPE 2 DIABETES MELLITUS WITH HYPERGLYCEMIA, WITHOUT LONG-TERM CURRENT USE OF INSULIN (HCC): ICD-10-CM

## 2023-09-11 RX ORDER — PEN NEEDLE, DIABETIC 32GX 5/32"
NEEDLE, DISPOSABLE MISCELLANEOUS
Qty: 100 EACH | Refills: 1 | Status: SHIPPED | OUTPATIENT
Start: 2023-09-11

## 2023-10-02 DIAGNOSIS — I10 PRIMARY HYPERTENSION: ICD-10-CM

## 2023-10-02 RX ORDER — AMLODIPINE BESYLATE 5 MG/1
5 TABLET ORAL DAILY
Qty: 30 TABLET | Refills: 0 | Status: SHIPPED | OUTPATIENT
Start: 2023-10-02 | End: 2023-11-01

## 2023-10-05 DIAGNOSIS — E11.65 TYPE 2 DIABETES MELLITUS WITH HYPERGLYCEMIA, WITHOUT LONG-TERM CURRENT USE OF INSULIN (HCC): ICD-10-CM

## 2023-10-05 DIAGNOSIS — E11.65 HYPERGLYCEMIA DUE TO TYPE 2 DIABETES MELLITUS (HCC): ICD-10-CM

## 2023-10-05 RX ORDER — BLOOD SUGAR DIAGNOSTIC
1 STRIP MISCELLANEOUS 2 TIMES DAILY
Qty: 200 STRIP | Refills: 1 | Status: SHIPPED | OUTPATIENT
Start: 2023-10-05

## 2023-10-29 DIAGNOSIS — I10 PRIMARY HYPERTENSION: ICD-10-CM

## 2023-10-30 RX ORDER — AMLODIPINE BESYLATE 5 MG/1
5 TABLET ORAL DAILY
Qty: 90 TABLET | Refills: 1 | Status: SHIPPED | OUTPATIENT
Start: 2023-10-30 | End: 2024-04-27

## 2023-11-01 DIAGNOSIS — Z79.4 TYPE 2 DIABETES MELLITUS WITH HYPERGLYCEMIA, WITH LONG-TERM CURRENT USE OF INSULIN (HCC): ICD-10-CM

## 2023-11-01 DIAGNOSIS — E11.65 TYPE 2 DIABETES MELLITUS WITH HYPERGLYCEMIA, WITH LONG-TERM CURRENT USE OF INSULIN (HCC): ICD-10-CM

## 2023-11-02 RX ORDER — DULAGLUTIDE 0.75 MG/.5ML
0.75 INJECTION, SOLUTION SUBCUTANEOUS
Qty: 2 ML | Refills: 0 | Status: SHIPPED | OUTPATIENT
Start: 2023-11-02

## 2023-11-06 ENCOUNTER — OFFICE VISIT (OUTPATIENT)
Dept: INTERNAL MEDICINE CLINIC | Facility: CLINIC | Age: 38
End: 2023-11-06
Payer: MEDICARE

## 2023-11-06 VITALS
BODY MASS INDEX: 42.5 KG/M2 | OXYGEN SATURATION: 97 % | HEART RATE: 110 BPM | WEIGHT: 315 LBS | SYSTOLIC BLOOD PRESSURE: 126 MMHG | DIASTOLIC BLOOD PRESSURE: 74 MMHG

## 2023-11-06 DIAGNOSIS — E11.65 TYPE 2 DIABETES MELLITUS WITH HYPERGLYCEMIA, WITH LONG-TERM CURRENT USE OF INSULIN (HCC): Primary | ICD-10-CM

## 2023-11-06 DIAGNOSIS — Z11.59 NEED FOR HEPATITIS C SCREENING TEST: ICD-10-CM

## 2023-11-06 DIAGNOSIS — I10 ESSENTIAL HYPERTENSION: ICD-10-CM

## 2023-11-06 DIAGNOSIS — R53.83 FATIGUE, UNSPECIFIED TYPE: ICD-10-CM

## 2023-11-06 DIAGNOSIS — Z23 ENCOUNTER FOR IMMUNIZATION: ICD-10-CM

## 2023-11-06 DIAGNOSIS — K21.9 GASTROESOPHAGEAL REFLUX DISEASE, UNSPECIFIED WHETHER ESOPHAGITIS PRESENT: ICD-10-CM

## 2023-11-06 DIAGNOSIS — Z79.4 TYPE 2 DIABETES MELLITUS WITH HYPERGLYCEMIA, WITH LONG-TERM CURRENT USE OF INSULIN (HCC): Primary | ICD-10-CM

## 2023-11-06 PROCEDURE — 90471 IMMUNIZATION ADMIN: CPT

## 2023-11-06 PROCEDURE — 99214 OFFICE O/P EST MOD 30 MIN: CPT | Performed by: INTERNAL MEDICINE

## 2023-11-06 PROCEDURE — 90686 IIV4 VACC NO PRSV 0.5 ML IM: CPT

## 2023-11-06 NOTE — ASSESSMENT & PLAN NOTE
-Appreciate endocrinology follow-up  -Tolerating current regimen well  -Patient plans to go for his BMP and hemoglobin A1c in the next 1 to 2 weeks    Lab Results   Component Value Date    HGBA1C 8.8 (H) 07/11/2023

## 2023-11-06 NOTE — PROGRESS NOTES
Name: Leah Denis      : 1985      MRN: [id number]  Encounter Provider: Brittani Villegas MD  Encounter Date: 2023   Enc[facility] department: [facility]    Assessment & Plan     1. Type 2 diabetes mellitus with hyperglycemia, with long-term current use of insulin (HCC)  Assessment & Plan:  -Appreciate endocrinology follow-up  -Tolerating current regimen well  -Patient plans to go for his BMP and hemoglobin A1c in the next 1 to 2 weeks    Lab Results   Component Value Date    HGBA1C 8.8 (H) 2023         2. Essential hypertension  Assessment & Plan:  -Blood pressure well controlled  -Continue current antihypertensive regimen      3. Gastroesophageal reflux disease, unspecified whether esophagitis present  Assessment & Plan:  -Well controlled   -Continue to monitor       4. Fatigue, unspecified type  Assessment & Plan:  -Home sleep study ordered to evaluate for possible sleep apnea    Orders:  -     Home Study; Future    5. Need for hepatitis C screening test  -     Hepatitis C Antibody; Future    6. Encounter for immunization  -     influenza vaccine, quadrivalent, 0.5 mL, preservative-free, for adult and pediatric patients 6 mos+ (AFLURIA, FLUARIX, FLULAVAL, FLUZONE)           Subjective      HPI  Patient presents today for chronic follow-up. His past medical history is notable for type 2 diabetes mellitus. He is currently being followed by endocrinology. He states his Levemir was recently increased to 20 units daily and he was started on Trulicity 2.76 mg. Overall he reports he is tolerating the Trulicity well but that he does experience some occasional bloating. He states on average his blood sugars have been ranging from the 190s to 230s. He denies any episodes of hypoglycemia. His only complaint today is fatigue and tiredness over the past 1 to 2 months. He states his sleep has been poor and that has been averaging 5 hours of sleep nightly.   He denies any snoring or witnessed apneas by family members. All other systems negative except for pertinent findings noted in HPI.        Current Outpatient Medications on File Prior to Visit   Medication Sig   • amLODIPine (NORVASC) 5 mg tablet TAKE 1 TABLET (5 MG TOTAL) BY MOUTH DAILY FOR 30 DOSES   • BD Pen Needle Princess 2nd Gen 32G X 4 MM MISC USE TO INJECT ONCE DAILY   • Blood Glucose Monitoring Suppl (OneTouch Verio) w/Device KIT Use 2 (two) times a day   • dulaglutide (Trulicity) 4.11 RG/3.8UX injection Inject 0.5 mL (0.75 mg total) under the skin every 7 days   • insulin detemir (Levemir FlexTouch) 100 Units/mL injection pen Inject 20 Units under the skin daily at bedtime   • Lancets (OneTouch Delica Plus [id number]) MISC USE 2 (TWO) TIMES A DAY   • metFORMIN (GLUCOPHAGE-XR) 500 mg 24 hr tablet TAKE ONE TABLET WITH BREAKFAST AND 2 TABS WITH DINNER   • OneTouch Verio test strip USE 1 EACH 2 (TWO) TIMES A DAY USE AS INSTRUCTED   • mometasone (ELOCON) 0.1 % cream Apply topically daily for 7 days   • [DISCONTINUED] famotidine (PEPCID) 20 mg tablet Take daily for next week then STOP (Patient not taking: Reported on 11/6/2023)   • [DISCONTINUED] hydrOXYzine HCL (ATARAX) 25 mg tablet Take 1 tablet (25 mg total) by mouth 3 (three) times a day as needed for itching (Patient not taking: Reported on 11/6/2023)   • [DISCONTINUED] ondansetron (Zofran) 4 mg tablet Take 1 tablet (4 mg total) by mouth every 8 (eight) hours as needed for nausea or vomiting for up to 10 doses ZOFRAN ODT 2 TABLETS DISSOLVE IN MOUTH EVERY 8 HRS AS NEEDED FOR NAUSEA (Patient not taking: Reported on 7/13/2023)       Objective     /74   Pulse (!) 110   Wt (!) 154 kg (340 lb)   SpO2 97%   BMI 42.50 kg/m²     BP Readings from Last 3 Encounters:   11/06/23 126/74   07/13/23 118/80   07/06/23 132/88        Wt Readings from Last 3 Encounters:   11/06/23 (!) 154 kg (340 lb)   07/13/23 (!) 150 kg (330 lb 3.2 oz)   07/06/23 (!) 148 kg (326 lb 6.4 oz) Physical Exam    General: NAD, Alert and oriented x3   HEENT: NCAT, EOMI, normal conjunctiva  Cardiovascular: RRR, normal S1 and S2, no m/r/g  Pulmonary: Normal respiratory effort, no wheezes, rales or rhonchi  GI: Soft, nontender, nondistended, normoactive bowel sounds  MSK: Normal bulk and tone  Neuro: Non-focal, ambulating without difficulty, non-antalgic gait  Extremities: No lower extremity edema  Skin: Normal skin color, no rashes     Conrado Wolf MD

## 2023-11-06 NOTE — PROGRESS NOTES
[address] [facility] [place]    NAME: Mack Allison  AGE: 45 y.o. SEX: male  : 1985     DATE: 2023     Assessment and Plan:     Problem List Items Addressed This Visit     Annual physical exam    Type 2 diabetes mellitus with hyperglycemia ([address] [facility])   Other Visit Diagnoses     Need for hepatitis C screening test    -  Primary          Immunizations and preventive care screenings were discussed with patient today. Appropriate education was printed on patient's after visit summary. Counseling:  {Annual Physical; Counselin}         Return in 6 months (on 2024) for Diabetes follow-up. Chief Complaint:     Chief Complaint   Patient presents with   • Follow-up      History of Present Illness:     Adult Annual Physical   Patient here for a comprehensive physical exam. The patient reports {problems:21944}. Diet and Physical Activity  Diet/Nutrition: {annual physical; diet:73907272}. Exercise: {annual physical; exercise:06146919}. Depression Screening  PHQ-2/9 Depression Screening         General Health  Sleep: {annual physical; sleep:56322907}. Hearing: {annual physical; hearin}. Vision: {annual physical; vision:38481019}. Dental: {annual physical; dental:20202204}.  Health  History of STDs? : {yes/no:18560}. Advanced Care Planning  Do you have an advanced directive? {YES/NO:}  Do you have a durable medical power of ?  {YES/NO:}     Review of Systems:     Review of Systems   Past Medical History:     Past Medical History:   Diagnosis Date   • Acute pancreatitis 3/26/2023   • Diabetes mellitus ([address]) 3/26/23   • Hypertension 3/26/23   • Obesity       Past Surgical History:     Past Surgical History:   Procedure Laterality Date   • KNEE SURGERY     • MOUTH SURGERY      tongue   • NO PAST SURGERIES     • TX ARTHRS KNE SURG W/MENISCECTOMY MED/LAT W/SHVG Left 2020 Procedure: KNEE ARTHROSCOPY PARTIAL LATERAL MENISCECTOMY;  Surgeon: Kassi Rios DO;  Location: [facility];  Service: Orthopedics      Social History:     Social History     Socioeconomic History   • Marital status: Single     Spouse name: None   • Number of children: None   • Years of education: None   • Highest education level: None   Occupational History   • None   Tobacco Use   • Smoking status: Some Days     Packs/day: 0.25     Years: 15.00     Total pack years: 3.75     Types: Cigarettes     Start date: 11/11/2002     Passive exposure: Past   • Smokeless tobacco: Never   Vaping Use   • Vaping Use: Never used   Substance and Sexual Activity   • Alcohol use: Not Currently     Comment: 1 time per month 10 beers   • Drug use: Never   • Sexual activity: Yes     Partners: Female     Birth control/protection: Female Sterilization   Other Topics Concern   • None   Social History Narrative   • None     Social Determinants of Health     Financial Resource Strain: Not on file   Food Insecurity: Not on file   Transportation Needs: Not on file   Physical Activity: Not on file   Stress: Not on file   Social Connections: Not on file   Intimate Partner Violence: Not on file   Housing Stability: Not on file      Family History:     Family History   Problem Relation Age of Onset   • Hypertension Mother    • Hypertension Father    • Diabetes Father    • Diabetes type II Father       Current Medications:     Current Outpatient Medications   Medication Sig Dispense Refill   • amLODIPine (NORVASC) 5 mg tablet TAKE 1 TABLET (5 MG TOTAL) BY MOUTH DAILY FOR 30 DOSES 90 tablet 1   • BD Pen Needle Princess 2nd Gen 32G X 4 MM MISC USE TO INJECT ONCE DAILY 100 each 1   • Blood Glucose Monitoring Suppl (OneTouch Verio) w/Device KIT Use 2 (two) times a day 1 kit 0   • dulaglutide (Trulicity) 9.68 CG/0.0VC injection Inject 0.5 mL (0.75 mg total) under the skin every 7 days 2 mL 0   • insulin detemir (Levemir FlexTouch) 100 Units/mL injection pen Inject 20 Units under the skin daily at bedtime 15 mL 1   • Lancets (OneTouch Delica Plus [id number]) MISC USE 2 (TWO) TIMES A  each 1   • metFORMIN (GLUCOPHAGE-XR) 500 mg 24 hr tablet TAKE ONE TABLET WITH BREAKFAST AND 2 TABS WITH DINNER 270 tablet 1   • OneTouch Verio test strip USE 1 EACH 2 (TWO) TIMES A DAY USE AS INSTRUCTED 200 strip 1   • famotidine (PEPCID) 20 mg tablet Take daily for next week then STOP (Patient not taking: Reported on 11/6/2023) 30 tablet 0   • hydrOXYzine HCL (ATARAX) 25 mg tablet Take 1 tablet (25 mg total) by mouth 3 (three) times a day as needed for itching (Patient not taking: Reported on 11/6/2023) 30 tablet 0   • mometasone (ELOCON) 0.1 % cream Apply topically daily for 7 days 120 g 0   • ondansetron (Zofran) 4 mg tablet Take 1 tablet (4 mg total) by mouth every 8 (eight) hours as needed for nausea or vomiting for up to 10 doses ZOFRAN ODT 2 TABLETS DISSOLVE IN MOUTH EVERY 8 HRS AS NEEDED FOR NAUSEA (Patient not taking: Reported on 7/13/2023) 10 tablet 3     No current facility-administered medications for this visit. Allergies:      Allergies   Allergen Reactions   • Lantus [Insulin Glargine] Rash      Physical Exam:     /74   Pulse (!) 110   Wt (!) 154 kg (340 lb)   SpO2 97%   BMI 42.50 kg/m²     Physical Exam     Conrado Almaguer MD   [facility]

## 2023-11-06 NOTE — PATIENT INSTRUCTIONS
Type 2 Diabetes Management for Adults   AMBULATORY CARE:   Type 2 diabetes  is a disease that affects how your body uses glucose (sugar). Either your body cannot make enough insulin, or it cannot use the insulin correctly. It is important to keep diabetes controlled to prevent damage to your heart, blood vessels, and other organs. Management will help you feel well and enjoy your daily activities. Your diabetes care team providers can help you make a plan to fit diabetes care into your schedule. Your plan can change over time to fit your needs and your family's needs. Have someone call your local emergency number (911 in the [address]) if:   You cannot be woken. You have signs of diabetic ketoacidosis:     confusion, fatigue    vomiting    rapid heartbeat    fruity smelling breath    extreme thirst    dry mouth and skin    You have any of the following signs of a heart attack:      Squeezing, pressure, or pain in your chest    You may  also have any of the following:     Discomfort or pain in your back, neck, jaw, stomach, or arm    Shortness of breath    Nausea or vomiting    Lightheadedness or a sudden cold sweat    You have any of the following signs of a stroke:      Numbness or drooping on one side of your face     Weakness in an arm or leg    Confusion or difficulty speaking    Dizziness, a severe headache, or vision loss    Call your doctor or diabetes care team provider if:   You have a sore or wound that will not heal.    You have a change in the amount you urinate. Your blood sugar levels are higher than your target goals. You often have lower blood sugar levels than your target goals. Your skin is red, dry, warm, or swollen. You have trouble coping with diabetes, or you feel anxious or depressed. You have trouble following any part of your care plan, such as your meal plan. You have questions or concerns about your condition or care.     What you need to know about high blood sugar levels:  High blood sugar levels may not cause any symptoms. You may feel more thirsty or urinate more often than usual. Over time, high blood sugar levels can damage your nerves, blood vessels, tissues, and organs. The following can increase your blood sugar levels:  Large meals or large amounts of carbohydrates at one time    Less physical activity    Stress    Illness    A lower dose of diabetes medicine or insulin, or a late dose    What you need to know about low blood sugar levels:  Symptoms include feeling shaky, dizzy, irritable, or confused. You can prevent symptoms by keeping your blood sugar levels from going too low. Treat a low blood sugar level right away:      Drink 4 ounces of juice or have 1 tube of glucose gel. Check your blood sugar level again 10 to 15 minutes later. When the level goes back to normal, eat a meal or snack to prevent another decrease. Keep glucose gel, raisins, or hard candy with you at all times to treat a low blood sugar level. Your blood sugar level can get too low if you take diabetes medicine or insulin and do not eat enough food. If you use insulin, check your blood sugar level before you exercise. If your blood sugar level is below 100 mg/dL, eat 4 crackers or 2 ounces of raisins, or drink 4 ounces of juice. Check your level every 30 minutes if you exercise longer than 1 hour. You may need a snack during or after exercise. What you can do to manage your blood sugar levels:   Check your blood sugar levels as directed and as needed. Several items are available to use to check your levels. You may need to check by testing a drop of blood in a glucose monitor. You may instead be given a continuous glucose monitoring (CGM) device. The device is worn at all times. The CGM checks your blood sugar level every 5 minutes. It sends results to an electronic device such as a smart phone. A CGM can be used with or without an insulin pump.  You and your diabetes care team providers will decide on the best method for you. The goal for blood sugar levels before meals  is between 80 and 130 mg/dL and 2 hours after eating  is lower than 180 mg/dL. Make healthy food choices. Work with a dietitian to create a meal plan that works for you and your schedule. A dietitian can help you learn how to eat the right amount of carbohydrates (sugar and starchy foods) during your meals and snacks. Examples of carbohydrates are breads, cereals, rice, pasta, fruit, low-fat dairy, and sweets. Carbohydrates can raise your blood sugar level if you eat too many at one time. Eat high-fiber foods as directed. Fiber helps improve blood sugar levels. Fiber also lowers your risk for heart disease and other problems diabetes can cause. Examples of high-fiber foods include vegetables, whole-grain bread, and beans such as liang beans. Your dietitian can tell you how much fiber to have each day. Get regular physical activity. Physical activity can help you get to your target blood sugar level goal and manage your weight. Get at least 150 minutes of moderate to vigorous aerobic physical activity each week. Resistance training, such as lifting weights, should be done 3 times each week. Do not miss more than 2 days of physical activity in a row. Do not sit longer than 30 minutes at a time. Your healthcare provider can help you create an activity plan. The plan can include the best activities for you and can help you build your strength and endurance. Maintain a healthy weight. Ask your team what a healthy weight is for you. A healthy weight can help you control diabetes and prevent heart disease. Ask your team to help you create a weight-loss plan, if needed. Even a loss of 3% to 7% of your excess body weight can help make a difference in managing diabetes. Your team will help you set a weight-loss goal, such as 10 to 15 pounds, or 5% of your extra weight. Together you and your team can set manageable weight-loss goals. Take your diabetes medicine or insulin as directed. You may need diabetes medicine, insulin, or both to help control your blood sugar levels. Your healthcare provider will teach you how and when to take your diabetes medicine or insulin. You will also be taught about side effects oral diabetes medicine can cause. Insulin may be injected or given through a pump or pen. You and your providers will decide on the best method for you: An insulin pump  is an implanted device that gives your insulin 24 hours a day. An insulin pump prevents the need for multiple insulin injections in a day. An insulin pen  is a device prefilled with the right amount of insulin. You and your family members will be taught how to draw up and give insulin  if this is the best method for you. Your providers will also teach you how to dispose of needles and syringes. You will learn how much insulin you need  and when to give it. You will be taught when not to give insulin. You will also be taught what to do if your blood sugar level drops too low. This may happen if you take insulin and do not eat the right amount of carbohydrates. More ways to manage type 2 diabetes:   Wear medical alert identification. Wear medical alert jewelry or carry a card that says you have diabetes. Ask your provider where to get these items. Do not smoke. Nicotine and other chemicals in cigarettes and cigars can cause lung and blood vessel damage. It also makes it more difficult to manage your diabetes. Ask your provider for information if you currently smoke and need help to quit. Do not use e-cigarettes or smokeless tobacco in place of cigarettes or to help you quit. They still contain nicotine. Check your feet each day for cuts, scratches, calluses, or other wounds. Look for redness and swelling, and feel for warmth. Wear shoes that fit well.  Check your shoes for rocks or other objects that can hurt your feet. Do not walk barefoot or wear shoes without socks. Wear cotton socks to help keep your feet dry. Ask about vaccines you may need. You have a higher risk for serious illness if you get the flu, pneumonia, COVID-19, or hepatitis. Ask your provider if you should get vaccines to prevent these or other diseases, and when to get the vaccines. Talk to your provider if you become stressed about diabetes care. Sometimes being able to fit diabetes care into your life can cause increased stress. The stress can cause you not to take care of yourself properly. Your provider can help by offering tips about self-care. A mental health provider can listen and offer help with self-care issues. Other types of counseling can help you make nutrition or physical activity changes. Have your A1c checked as directed. Your provider may check your A1c every 3 months, or 2 times each year if your diabetes is controlled. An A1c test shows the average amount of sugar in your blood over the past 2 to 3 months. Your provider will tell you what your A1c level should be. Have screening tests as directed. Your provider may recommend screening for complications of diabetes and other conditions that may develop. Some screenings may begin right away and some may happen within the first 5 years of diagnosis:    Examples of diabetes complications  include kidney problems, high cholesterol, high blood pressure, blood vessel problems, eye problems, and sleep apnea. You may be screened for a low vitamin B level  if you take oral diabetes medicine for a long time. You may be screened for polycystic ovarian syndrome (PCOS)  if you are of childbearing age. Follow up with your doctor or diabetes care team providers as directed: You may need to have blood tests done before your follow-up visit. The test results will show if changes need to be made in your treatment or self-care. Talk to your provider if you cannot afford your medicine. Write down your questions so you remember to ask them during your visits. © Copyright [employer] 2023 Information is for End User's use only and may not be sold, redistributed or otherwise used for commercial purposes. The above information is an  only. It is not intended as medical advice for individual conditions or treatments. Talk to your doctor, nurse or pharmacist before following any medical regimen to see if it is safe and effective for you. Foot Care for People with Diabetes   AMBULATORY CARE:   What you need to know about foot care:  Long-term high blood sugar levels can damage the blood vessels and nerves in your legs and feet. This damage makes it hard to feel pressure, pain, temperature, and touch. You may not be able to feel a cut or sore, or shoes that are too tight. Foot care is needed to prevent serious problems, such as an infection or amputation. Diabetes may cause your toes to become crooked or curved under. These changes may affect the way you walk and can lead to increased pressure on your foot. The pressure can decrease blood flow to your feet. Lack of blood flow increases your risk for a foot ulcer. Call your care team provider if:   Your feet become numb, weak, or hard to move. You have pus draining from a sore on your foot. You have a wound on your foot that gets bigger, deeper, or does not heal.    You see blisters, cuts, scratches, calluses, or sores on your foot. You have a fever, and your feet become red, warm, and swollen. Your toenails become thick, curled, or yellow. You find it hard to check your feet because your vision is poor. You have questions or concerns about your condition or care. How to care for your feet:   Check your feet each day. Look at your whole foot, including the bottom, and between and under your toes. Check for wounds, corns, and calluses.  Use a mirror to see the bottom of your feet. The skin on your feet may be shiny, tight, or darker than normal. Your feet may also be cold and pale. Feel your feet by running your hands along the tops, bottoms, sides, and between your toes. Redness, swelling, and warmth are signs of blood flow problems that can lead to a foot ulcer. Do not try to remove corns or calluses yourself. Do not ignore small problems, such as dry skin or small wounds. These can become life-threatening over time without proper care. Wash your feet each day with soap and warm water. Do not use hot water, because this can injure your foot. Dry your feet gently with a towel after you wash them. Dry between and under your toes. Apply lotion or a moisturizer on your dry feet. Ask your care team provider what lotions are best to use. Do not put lotion or moisturizer between your toes. Moisture between your toes could lead to skin breakdown. Cut your toenails correctly. File or cut your toenails straight across. Use a soft brush to clean around your toenails. If your toenails are very thick, you may need to have a care team provider or specialist cut them. Protect your feet. Do not walk barefoot or wear your shoes without socks. Check your shoes for rocks or other objects that can hurt your feet. Wear cotton socks to help keep your feet dry. Wear socks without toe seams, or wear them with the seams inside out. Change your socks each day. Do not wear socks that are dirty or damp. Wear shoes that fit well. Wear shoes that do not rub against any area of your feet. Your shoes should be ½ to ¾ inch (1 to 2 centimeters) longer than your feet. Your shoes should also have extra space around the widest part of your feet. Walking or athletic shoes with laces or straps that adjust are best. Ask your care team provider for help to choose shoes that fit you best. Ask your provider if you need to wear an insert, orthotic, or bandage on your feet.          Go to your follow-up visits. Your care team provider will do a foot exam at least 1 time each year. You may need a foot exam more often if you have nerve damage, foot deformities, or ulcers. Your provider will check for nerve damage and how well you can feel your feet. Your provider will check your shoes to see if they fit well. Do not smoke. Smoking can damage your blood vessels and put you at increased risk for foot ulcers. Ask your care team provider for information if you currently smoke and need help to quit. E-cigarettes or smokeless tobacco still contain nicotine. Talk to your care team provider before you use these products. Follow up with your diabetes care team provider or foot specialist as directed: You will need to have your feet checked at least 1 time each year. You may need a foot exam more often if you have nerve damage, foot deformities, or ulcers. Write down your questions so you remember to ask them during your visits. © Copyright [facility] 2023 Information is for End User's use only and may not be sold, redistributed or otherwise used for commercial purposes. The above information is an  only. It is not intended as medical advice for individual conditions or treatments. Talk to your doctor, nurse or pharmacist before following any medical regimen to see if it is safe and effective for you. Wellness Visit for Adults   AMBULATORY CARE:   A wellness visit  is when you see your healthcare provider to get screened for health problems. Your healthcare provider will also give you advice on how to stay healthy. Write down your questions so you remember to ask them. Ask your healthcare provider how often you should have a wellness visit. What happens at a wellness visit:  Your healthcare provider will ask about your health, and your family history of health problems. This includes high blood pressure, heart disease, and cancer.  He or she will ask if you have symptoms that concern you, if you smoke, and about your mood. You may also be asked about your intake of medicines, supplements, food, and alcohol. Any of the following may be done: Your weight  will be checked. Your height may also be checked so your body mass index (BMI) can be calculated. Your BMI shows if you are at a healthy weight. Your blood pressure  and heart rate will be checked. Your temperature may also be checked. Blood and urine tests  may be done. Blood tests may be done to check your cholesterol levels. Abnormal cholesterol levels increase your risk for heart disease and stroke. You may also need a blood or urine test to check for diabetes if you are at increased risk. Urine tests may be done to look for signs of an infection or kidney disease. A physical exam  includes checking your heartbeat and lungs with a stethoscope. Your healthcare provider may also check your skin to look for sun damage. Screening tests  may be recommended. A screening test is done to check for diseases that may not cause symptoms. The screening tests you may need depend on your age, gender, family history, and lifestyle habits. For example, colorectal screening may be recommended if you are 48years old or older. Screening tests you need if you are a woman:   A Pap smear  is used to screen for cervical cancer. Pap smears are usually done every 3 to 5 years depending on your age. You may need them more often if you have had abnormal Pap smear test results in the past. Ask your healthcare provider how often you should have a Pap smear. A mammogram  is an x-ray of your breasts to screen for breast cancer. Experts recommend mammograms every 2 years starting at age 48 years. You may need a mammogram at age 52 years or younger if you have an increased risk for breast cancer. Talk to your healthcare provider about when you should start having mammograms and how often you need them.     Vaccines you may need:   Get an influenza vaccine every year. The influenza vaccine protects you from the flu. Several types of viruses cause the flu. The viruses change over time, so new vaccines are made each year. Get a tetanus-diphtheria (Td) booster vaccine  every 10 years. This vaccine protects you against tetanus and diphtheria. Tetanus is a severe infection that may cause painful muscle spasms and lockjaw. Diphtheria is a severe bacterial infection that causes a thick covering in the back of your mouth and throat. Get a human papillomavirus (HPV) vaccine  if you are female and aged 23 to 32 or male 23 to 24 and never received it. This vaccine protects you from HPV infection. HPV is the most common infection spread by sexual contact. HPV may also cause vaginal, penile, and anal cancers. Get a pneumococcal vaccine  if you are aged 72 years or older. The pneumococcal vaccine is an injection given to protect you from pneumococcal disease. Pneumococcal disease is an infection caused by pneumococcal bacteria. The infection may cause pneumonia, meningitis, or an ear infection. Get a shingles vaccine  if you are 60 or older, even if you have had shingles before. The shingles vaccine is an injection to protect you from the varicella-zoster virus. This is the same virus that causes chickenpox. Shingles is a painful rash that develops in people who had chickenpox or have been exposed to the virus. How to eat healthy:  My Plate is a model for planning healthy meals. It shows the types and amounts of foods that should go on your plate. Fruits and vegetables make up about half of your plate, and grains and protein make up the other half. A serving of dairy is included on the side of your plate. The amount of calories and serving sizes you need depends on your age, gender, weight, and height. Examples of healthy foods are listed below:  Eat a variety of vegetables  such as dark green, red, and orange vegetables.  You can also include canned vegetables low in sodium (salt) and frozen vegetables without added butter or sauces. Eat a variety of fresh fruits , canned fruit in 100% juice, frozen fruit, and dried fruit. Include whole grains. At least half of the grains you eat should be whole grains. Examples include whole-wheat bread, wheat pasta, brown rice, and whole-grain cereals such as oatmeal.    Eat a variety of protein foods such as seafood (fish and shellfish), lean meat, and poultry without skin (turkey and chicken). Examples of lean meats include pork leg, shoulder, or tenderloin, and beef round, sirloin, tenderloin, and extra lean ground beef. Other protein foods include eggs and egg substitutes, beans, peas, soy products, nuts, and seeds. Choose low-fat dairy products such as skim or 1% milk or low-fat yogurt, cheese, and cottage cheese. Limit unhealthy fats  such as butter, hard margarine, and shortening. Exercise:  Exercise at least 30 minutes per day on most days of the week. Some examples of exercise include walking, biking, dancing, and swimming. You can also fit in more physical activity by taking the stairs instead of the elevator or parking farther away from stores. Include muscle strengthening activities 2 days each week. Regular exercise provides many health benefits. It helps you manage your weight, and decreases your risk for type 2 diabetes, heart disease, stroke, and high blood pressure. Exercise can also help improve your mood. Ask your healthcare provider about the best exercise plan for you. General health and safety guidelines:   Do not smoke. Nicotine and other chemicals in cigarettes and cigars can cause lung damage. Ask your healthcare provider for information if you currently smoke and need help to quit. E-cigarettes or smokeless tobacco still contain nicotine. Talk to your healthcare provider before you use these products. Limit alcohol.   A drink of alcohol is 12 ounces of beer, 5 ounces of wine, or 1½ ounces of liquor. Lose weight, if needed. Being overweight increases your risk of certain health conditions. These include heart disease, high blood pressure, type 2 diabetes, and certain types of cancer. Protect your skin. Do not sunbathe or use tanning beds. Use sunscreen with a SPF 15 or higher. Apply sunscreen at least 15 minutes before you go outside. Reapply sunscreen every 2 hours. Wear protective clothing, hats, and sunglasses when you are outside. Drive safely. Always wear your seatbelt. Make sure everyone in your car wears a seatbelt. A seatbelt can save your life if you are in an accident. Do not use your cell phone when you are driving. This could distract you and cause an accident. Pull over if you need to make a call or send a text message. Practice safe sex. Use latex condoms if are sexually active and have more than one partner. Your healthcare provider may recommend screening tests for sexually transmitted infections (STIs). Wear helmets, lifejackets, and protective gear. Always wear a helmet when you ride a bike or motorcycle, go skiing, or play sports that could cause a head injury. Wear protective equipment when you play sports. Wear a lifejacket when you are on a boat or doing water sports. © Copyright [facility] 2023 Information is for End User's use only and may not be sold, redistributed or otherwise used for commercial purposes. The above information is an  only. It is not intended as medical advice for individual conditions or treatments. Talk to your doctor, nurse or pharmacist before following any medical regimen to see if it is safe and effective for you.

## 2023-11-06 NOTE — LETTER
November 6, 2023     Patient: Cass De Anda  YOB: 1985  Date of Visit: 11/6/2023      To Whom it May Concern:    Cass De Anda is under my professional care. Danial was seen in my office on 11/6/2023. I am writing a letter of medical necessity on behalf of the patient. His past medical history is most notable for type 2 diabetes mellitus, essential hypertension and GERD. I believe it is medically necessary for him to have a deep clean. Failure to do so given his diabetes could potentially lead to worsening dental decay or infection. If you have any questions or concerns, please don't hesitate to call.          Sincerely,          Guicho Alejo MD

## 2023-11-10 DIAGNOSIS — E11.65 TYPE 2 DIABETES MELLITUS WITH HYPERGLYCEMIA, WITH LONG-TERM CURRENT USE OF INSULIN (HCC): ICD-10-CM

## 2023-11-10 DIAGNOSIS — Z79.4 TYPE 2 DIABETES MELLITUS WITH HYPERGLYCEMIA, WITH LONG-TERM CURRENT USE OF INSULIN (HCC): ICD-10-CM

## 2023-11-10 RX ORDER — INSULIN DETEMIR 100 [IU]/ML
20 INJECTION, SOLUTION SUBCUTANEOUS
Qty: 15 ML | Refills: 1 | Status: SHIPPED | OUTPATIENT
Start: 2023-11-10

## 2023-11-26 DIAGNOSIS — Z79.4 TYPE 2 DIABETES MELLITUS WITH HYPERGLYCEMIA, WITH LONG-TERM CURRENT USE OF INSULIN (HCC): ICD-10-CM

## 2023-11-26 DIAGNOSIS — E11.65 TYPE 2 DIABETES MELLITUS WITH HYPERGLYCEMIA, WITH LONG-TERM CURRENT USE OF INSULIN (HCC): ICD-10-CM

## 2023-11-27 ENCOUNTER — TELEPHONE (OUTPATIENT)
Dept: SLEEP CENTER | Facility: CLINIC | Age: 38
End: 2023-11-27

## 2023-11-27 DIAGNOSIS — R53.82 CHRONIC FATIGUE: Primary | ICD-10-CM

## 2023-11-27 RX ORDER — DULAGLUTIDE 0.75 MG/.5ML
0.75 INJECTION, SOLUTION SUBCUTANEOUS
Qty: 2 ML | Refills: 5 | Status: SHIPPED | OUTPATIENT
Start: 2023-11-27

## 2023-12-07 ENCOUNTER — HOSPITAL ENCOUNTER (EMERGENCY)
Facility: HOSPITAL | Age: 38
Discharge: HOME | End: 2023-12-07
Attending: EMERGENCY MEDICINE
Payer: MEDICARE

## 2023-12-07 ENCOUNTER — APPOINTMENT (EMERGENCY)
Dept: RADIOLOGY | Facility: HOSPITAL | Age: 38
End: 2023-12-07
Payer: MEDICARE

## 2023-12-07 VITALS
TEMPERATURE: 97.6 F | OXYGEN SATURATION: 99 % | SYSTOLIC BLOOD PRESSURE: 153 MMHG | HEART RATE: 95 BPM | RESPIRATION RATE: 16 BRPM | DIASTOLIC BLOOD PRESSURE: 99 MMHG

## 2023-12-07 DIAGNOSIS — M76.31 ILIOTIBIAL BAND SYNDROME AFFECTING LOWER LEG, RIGHT: Primary | ICD-10-CM

## 2023-12-07 PROCEDURE — 99284 EMERGENCY DEPT VISIT MOD MDM: CPT | Performed by: PHYSICIAN ASSISTANT

## 2023-12-07 PROCEDURE — 73552 X-RAY EXAM OF FEMUR 2/>: CPT

## 2023-12-07 PROCEDURE — 99283 EMERGENCY DEPT VISIT LOW MDM: CPT

## 2023-12-07 PROCEDURE — 72170 X-RAY EXAM OF PELVIS: CPT

## 2023-12-07 RX ORDER — NAPROXEN 500 MG/1
500 TABLET ORAL 2 TIMES DAILY WITH MEALS
Qty: 14 TABLET | Refills: 0 | Status: SHIPPED | OUTPATIENT
Start: 2023-12-07 | End: 2023-12-14

## 2023-12-07 RX ORDER — CYCLOBENZAPRINE HCL 5 MG
5 TABLET ORAL 3 TIMES DAILY PRN
Qty: 9 TABLET | Refills: 0 | Status: SHIPPED | OUTPATIENT
Start: 2023-12-07

## 2023-12-07 RX ORDER — NAPROXEN 250 MG/1
500 TABLET ORAL ONCE
Status: COMPLETED | OUTPATIENT
Start: 2023-12-07 | End: 2023-12-07

## 2023-12-07 RX ADMIN — NAPROXEN 500 MG: 250 TABLET ORAL at 11:00

## 2023-12-07 NOTE — Clinical Note
Vlad Costa was seen and treated in our emergency department on 12/7/2023. No restrictions            Diagnosis:     Danial  . He may return on this date: 12/11/2023         If you have any questions or concerns, please don't hesitate to call.       Mary Carmen Brothers PA-C    ______________________________           _______________          _______________  Hospital Representative                              Date                                Time

## 2023-12-07 NOTE — ED PROVIDER NOTES
History  Chief Complaint   Patient presents with    Leg Pain     Reporting pain in lateral right leg, just above knee. Pain feels like "stinging, numbness." Denies any known injury. Started about a week ago. Unrelieved by ibuprofen. History provided by:  Patient  Leg Pain  Location:  Leg  Time since incident:  1 week  Injury: no    Leg location:  R upper leg  Pain details:     Quality:  Aching    Radiates to:  Does not radiate    Severity:  Moderate    Onset quality:  Gradual    Duration:  1 week    Timing:  Constant    Progression:  Waxing and waning  Chronicity:  New  Dislocation: no    Prior injury to area:  No  Relieved by:  None tried  Worsened by:  Nothing  Ineffective treatments:  Rest  Associated symptoms: numbness    Associated symptoms: no back pain, no decreased ROM, no fatigue, no fever, no muscle weakness, no stiffness, no swelling and no tingling    Risk factors: no concern for non-accidental trauma, no frequent fractures, no known bone disorder, no obesity and no recent illness        Prior to Admission Medications   Prescriptions Last Dose Informant Patient Reported? Taking?    BD Pen Needle Princess 2nd Gen 32G X 4 MM MISC   No No   Sig: USE TO INJECT ONCE DAILY   Blood Glucose Monitoring Suppl (OneTouch Verio) w/Device KIT   No No   Sig: Use 2 (two) times a day   Lancets (OneTouch Delica Plus [id number]) MISC   No No   Sig: USE 2 (TWO) TIMES A DAY   OneTouch Verio test strip   No No   Sig: USE 1 EACH 2 (TWO) TIMES A DAY USE AS INSTRUCTED   amLODIPine (NORVASC) 5 mg tablet   No No   Sig: TAKE 1 TABLET (5 MG TOTAL) BY MOUTH DAILY FOR 30 DOSES   dulaglutide (Trulicity) 8.09 HO/7.6EP injection   No No   Sig: INJECT 0.5 ML (0.75 MG TOTAL) UNDER THE SKIN EVERY 7 DAYS   insulin detemir (Levemir FlexPen) 100 Units/mL injection pen   No No   Sig: INJECT 20 UNITS UNDER THE SKIN DAILY AT BEDTIME   metFORMIN (GLUCOPHAGE-XR) 500 mg 24 hr tablet   No No   Sig: TAKE ONE TABLET WITH BREAKFAST AND 2 TABS WITH DINNER   mometasone (ELOCON) 0.1 % cream   No No   Sig: Apply topically daily for 7 days      Facility-Administered Medications: None       Past Medical History:   Diagnosis Date    Acute pancreatitis 3/26/2023    Diabetes mellitus ([address] ) 3/26/23    Hypertension 3/26/23    Obesity        Past Surgical History:   Procedure Laterality Date    KNEE SURGERY      MOUTH SURGERY      tongue    NO PAST SURGERIES      IA ARTHRS KNE SURG W/MENISCECTOMY MED/LAT W/SHVG Left 09/08/2020    Procedure: KNEE ARTHROSCOPY PARTIAL LATERAL MENISCECTOMY;  Surgeon: Emil Obregon DO;  Location: [facility];  Service: Orthopedics       Family History   Problem Relation Age of Onset    Hypertension Mother     Hypertension Father     Diabetes Father     Diabetes type II Father      I have reviewed and agree with the history as documented. E-Cigarette/Vaping    E-Cigarette Use Never User      E-Cigarette/Vaping Substances    Nicotine No     THC No     CBD No     Flavoring No     Other No     Unknown No      Social History     Tobacco Use    Smoking status: Some Days     Packs/day: 0.25     Years: 15.00     Total pack years: 3.75     Types: Cigarettes     Start date: 11/11/2002     Passive exposure: Past    Smokeless tobacco: Never   Vaping Use    Vaping Use: Never used   Substance Use Topics    Alcohol use: Not Currently     Comment: 1 time per month 10 beers    Drug use: Never       Review of Systems   Constitutional:  Positive for activity change. Negative for appetite change, chills, fatigue and fever. Musculoskeletal:  Positive for arthralgias and gait problem. Negative for back pain, joint swelling and stiffness. Skin:  Negative for color change, pallor and rash. Neurological:  Positive for numbness. All other systems reviewed and are negative. Physical Exam  Physical Exam  Vitals and nursing note reviewed. Constitutional:       General: He is not in acute distress. Appearance: Normal appearance. He is obese.  He is not ill-appearing, toxic-appearing or diaphoretic. HENT:      Head: Normocephalic and atraumatic. Right Ear: External ear normal.      Left Ear: External ear normal.   Eyes:      Conjunctiva/sclera: Conjunctivae normal.   Pulmonary:      Effort: Pulmonary effort is normal.   Musculoskeletal:      Comments: Examination of the right thigh-it is atraumatic upon inspection. There is tenderness palpated over the fascia jackeline. There is tenderness upon insertion to the knee. There is increased pain with stretching the fascia jackeline. There is full range of motion of the right hip and right knee in all planes. Collateral ligaments are intact. There is negative Lachman sign. Inspection of the lumbar spine-there is a negative Trendelenburg sign. There is no spasm palpated. Patient does reverse his normal orthotic curve with forward flexion. Skin:     General: Skin is warm. Capillary Refill: Capillary refill takes less than 2 seconds. Neurological:      Mental Status: He is alert and oriented to person, place, and time. Psychiatric:         Mood and Affect: Mood normal.         Behavior: Behavior normal.         Thought Content:  Thought content normal.         Judgment: Judgment normal.         Vital Signs  ED Triage Vitals [12/07/23 0939]   Temperature Pulse Respirations Blood Pressure SpO2   97.6 °F (36.4 °C) 95 16 153/99 99 %      Temp Source Heart Rate Source Patient Position - Orthostatic VS BP Location FiO2 (%)   Oral Monitor Sitting Right arm --      Pain Score       7           Vitals:    12/07/23 0939   BP: 153/99   Pulse: 95   Patient Position - Orthostatic VS: Sitting         Visual Acuity      ED Medications  Medications   naproxen (NAPROSYN) tablet 500 mg (500 mg Oral Given 12/7/23 1100)       Diagnostic Studies  Results Reviewed       None                   XR femur 2 views RIGHT   ED Interpretation by Bairon Manriquez PA-C (12/07 1214)   No fracture      XR pelvis ap only 1 or 2 vw   ED Interpretation by Darius Sanchez PA-C (12/07 1214)   No fracture                 Procedures  Procedures         ED Course                                             Medical Decision Making  Patient presents to the emergency room with complaints of pain  over the lateral aspect of his right thigh extending to the proximal aspect of the knee. He complains of pain with ambulation. He Has occasional numbness over the area as well. He denies any injury. He denies any back pain. He denies any recent change in shoe wear. Physical exam patient was examined. Tenderness palpated over the iliotibial band. Increased pain with stretching the area. He has full range of motion of the right hip and right knee in all planes. There is a negative Lachman sign and collateral ligaments are intact. He had no or calf tenderness. He had no dependent edema. Pulses were intact to the lower extremities over the dorsalis pedis and posterior tibial arteries. Capillary refill was less than 2 seconds. Impression  Acute iliotibial band syndrome right leg    Plan  Patient was given a prescription for Naprosyn 500 mg twice daily as needed for pain with food. He was also given a prescription for Flexeril 5 mg every 8 hours as needed for pain or spasm. He was given orthopedic referral for follow-up. He will call to make an appointment. He was given reasons to return to the emergency room should his symptoms worsen. Amount and/or Complexity of Data Reviewed  Radiology: ordered and independent interpretation performed. Details: X-rays of the pelvis and right femur are negative for any acute process, mild degenerative changes right knee. Independently interpreted by me    Risk  Prescription drug management.              Disposition  Final diagnoses:   Iliotibial band syndrome affecting lower leg, right     Time reflects when diagnosis was documented in both MDM as applicable and the Disposition within this note Time User Action Codes Description Comment    12/7/2023 12:15 PM Primus Section Add [M76.31] Iliotibial band syndrome affecting lower leg, right           ED Disposition       ED Disposition   Discharge    Condition   Stable    Date/Time   Thu Dec 7, 2023 12:15 PM    Comment   Palmira Casper discharge to home/self care.                    Follow-up Information       Follow up With Specialties Details Why Contact Info Additional [address] [facility] Schedule an appointment as soon as possible for a visit in 1 week  [address] [facility] [address] [facility] [address] [facility] [place], [address] [facility] [address], [address] [place], [place], [address]            Discharge Medication List as of 12/7/2023 12:18 PM        START taking these medications    Details   cyclobenzaprine (FLEXERIL) 5 mg tablet Take 1 tablet (5 mg total) by mouth 3 (three) times a day as needed for muscle spasms for up to 9 doses, Starting Thu 12/7/2023, Normal      naproxen (Naprosyn) 500 mg tablet Take 1 tablet (500 mg total) by mouth 2 (two) times a day with meals for 14 doses, Starting Thu 12/7/2023, Until Thu 12/14/2023, Normal           CONTINUE these medications which have NOT CHANGED    Details   amLODIPine (NORVASC) 5 mg tablet TAKE 1 TABLET (5 MG TOTAL) BY MOUTH DAILY FOR 30 DOSES, Starting Mon 10/30/2023, Until Sat 4/27/2024, Normal      BD Pen Needle Princess 2nd Gen 32G X 4 MM MISC USE TO INJECT ONCE DAILY, Normal      Blood Glucose Monitoring Suppl (OneTouch Verio) w/Device KIT Use 2 (two) times a day, Starting Mon 3/27/2023, Normal      dulaglutide (Trulicity) 9.74 CB/8.7ZX injection INJECT 0.5 ML (0.75 MG TOTAL) UNDER THE SKIN EVERY 7 DAYS, Starting Mon 11/27/2023, Normal      insulin detemir (Levemir FlexPen) 100 Units/mL injection pen INJECT 20 UNITS UNDER THE SKIN DAILY AT BEDTIME, Starting Fri 11/10/2023, Normal Lancets (OneTouch Delica Plus [id number]) MISC USE 2 (TWO) TIMES A DAY, Normal      metFORMIN (GLUCOPHAGE-XR) 500 mg 24 hr tablet TAKE ONE TABLET WITH BREAKFAST AND 2 TABS WITH DINNER, Normal      mometasone (ELOCON) 0.1 % cream Apply topically daily for 7 days, Starting Fri 7/7/2023, Until Fri 7/14/2023, Normal      OneTouch Verio test strip USE 1 EACH 2 (TWO) TIMES A DAY USE AS INSTRUCTED, Starting u 10/5/2023, Normal             No discharge procedures on file.     PDMP Review         Value Time User    PDMP Reviewed  Yes 9/8/2020  8:41 AM Padmaja Chacon PA-C            ED Provider  Electronically Signed by             Vic Bynum PA-C  12/07/23 4740

## 2023-12-07 NOTE — Clinical Note
Samantha Partida was seen and treated in our emergency department on 12/7/2023. No restrictions            Diagnosis:     Danial  . He may return on this date: 12/11/2023         If you have any questions or concerns, please don't hesitate to call.       Latonya Maurer PA-C    ______________________________           _______________          _______________  Hospital Representative                              Date                                Time

## 2023-12-08 ENCOUNTER — VBI (OUTPATIENT)
Dept: INTERNAL MEDICINE CLINIC | Facility: CLINIC | Age: 38
End: 2023-12-08

## 2023-12-08 NOTE — TELEPHONE ENCOUNTER
12/08/23 10:14 AM    Patient contacted post ED visit, [facility] department spoke with patient/caregiver and outreach was successful. Thank you.   Beverly Zepeda  [facility]

## 2024-01-11 DIAGNOSIS — Z79.4 TYPE 2 DIABETES MELLITUS WITH HYPERGLYCEMIA, WITH LONG-TERM CURRENT USE OF INSULIN (HCC): ICD-10-CM

## 2024-01-11 DIAGNOSIS — E11.65 TYPE 2 DIABETES MELLITUS WITH HYPERGLYCEMIA, WITH LONG-TERM CURRENT USE OF INSULIN (HCC): ICD-10-CM

## 2024-01-11 DIAGNOSIS — E11.65 HYPERGLYCEMIA DUE TO TYPE 2 DIABETES MELLITUS (HCC): ICD-10-CM

## 2024-01-11 DIAGNOSIS — I10 PRIMARY HYPERTENSION: ICD-10-CM

## 2024-01-11 RX ORDER — AMLODIPINE BESYLATE 5 MG/1
5 TABLET ORAL DAILY
Qty: 90 TABLET | Refills: 1 | Status: SHIPPED | OUTPATIENT
Start: 2024-01-11 | End: 2024-07-09

## 2024-01-12 RX ORDER — DULAGLUTIDE 0.75 MG/.5ML
0.75 INJECTION, SOLUTION SUBCUTANEOUS
Qty: 2 ML | Refills: 0 | Status: SHIPPED | OUTPATIENT
Start: 2024-01-12

## 2024-01-12 RX ORDER — METFORMIN HYDROCHLORIDE 500 MG/1
TABLET, EXTENDED RELEASE ORAL
Qty: 270 TABLET | Refills: 0 | Status: SHIPPED | OUTPATIENT
Start: 2024-01-12

## 2024-04-08 DIAGNOSIS — E11.65 TYPE 2 DIABETES MELLITUS WITH HYPERGLYCEMIA, WITHOUT LONG-TERM CURRENT USE OF INSULIN (HCC): ICD-10-CM

## 2024-04-08 DIAGNOSIS — Z79.4 TYPE 2 DIABETES MELLITUS WITH HYPERGLYCEMIA, WITH LONG-TERM CURRENT USE OF INSULIN (HCC): ICD-10-CM

## 2024-04-08 DIAGNOSIS — E11.65 TYPE 2 DIABETES MELLITUS WITH HYPERGLYCEMIA, WITH LONG-TERM CURRENT USE OF INSULIN (HCC): ICD-10-CM

## 2024-04-08 RX ORDER — PEN NEEDLE, DIABETIC 32GX 5/32"
NEEDLE, DISPOSABLE MISCELLANEOUS
Qty: 100 EACH | Refills: 1 | Status: SHIPPED | OUTPATIENT
Start: 2024-04-08

## 2024-04-08 RX ORDER — INSULIN DETEMIR 100 [IU]/ML
20 INJECTION, SOLUTION SUBCUTANEOUS
Qty: 15 ML | Refills: 5 | Status: SHIPPED | OUTPATIENT
Start: 2024-04-08

## 2024-04-09 DIAGNOSIS — E11.65 HYPERGLYCEMIA DUE TO TYPE 2 DIABETES MELLITUS (HCC): ICD-10-CM

## 2024-04-09 RX ORDER — METFORMIN HYDROCHLORIDE 500 MG/1
TABLET, EXTENDED RELEASE ORAL
Qty: 270 TABLET | Refills: 1 | Status: SHIPPED | OUTPATIENT
Start: 2024-04-09 | End: 2024-04-11

## 2024-04-11 DIAGNOSIS — E11.65 HYPERGLYCEMIA DUE TO TYPE 2 DIABETES MELLITUS (HCC): ICD-10-CM

## 2024-04-11 RX ORDER — METFORMIN HYDROCHLORIDE 500 MG/1
TABLET, EXTENDED RELEASE ORAL
Qty: 270 TABLET | Refills: 1 | Status: SHIPPED | OUTPATIENT
Start: 2024-04-11

## 2024-04-15 DIAGNOSIS — I10 PRIMARY HYPERTENSION: ICD-10-CM

## 2024-04-15 DIAGNOSIS — E11.65 TYPE 2 DIABETES MELLITUS WITH HYPERGLYCEMIA, WITH LONG-TERM CURRENT USE OF INSULIN (HCC): ICD-10-CM

## 2024-04-15 DIAGNOSIS — Z79.4 TYPE 2 DIABETES MELLITUS WITH HYPERGLYCEMIA, WITH LONG-TERM CURRENT USE OF INSULIN (HCC): ICD-10-CM

## 2024-04-15 DIAGNOSIS — E11.65 HYPERGLYCEMIA DUE TO TYPE 2 DIABETES MELLITUS (HCC): ICD-10-CM

## 2024-04-16 RX ORDER — METFORMIN HYDROCHLORIDE 500 MG/1
TABLET, EXTENDED RELEASE ORAL
Qty: 270 TABLET | Refills: 0 | OUTPATIENT
Start: 2024-04-16

## 2024-04-16 RX ORDER — DULAGLUTIDE 0.75 MG/.5ML
0.75 INJECTION, SOLUTION SUBCUTANEOUS
Qty: 2 ML | Refills: 5 | Status: SHIPPED | OUTPATIENT
Start: 2024-04-16

## 2024-04-16 RX ORDER — AMLODIPINE BESYLATE 5 MG/1
5 TABLET ORAL DAILY
Qty: 90 TABLET | Refills: 0 | Status: SHIPPED | OUTPATIENT
Start: 2024-04-16 | End: 2024-10-13

## 2024-04-23 ENCOUNTER — OFFICE VISIT (OUTPATIENT)
Dept: PODIATRY | Facility: CLINIC | Age: 39
End: 2024-04-23
Payer: MEDICARE

## 2024-04-23 VITALS — SYSTOLIC BLOOD PRESSURE: 130 MMHG | DIASTOLIC BLOOD PRESSURE: 89 MMHG | HEART RATE: 87 BPM

## 2024-04-23 DIAGNOSIS — M21.6X9 OTHER ACQUIRED DEFORMITIES OF UNSPECIFIED FOOT: ICD-10-CM

## 2024-04-23 DIAGNOSIS — L85.1 ACQUIRED KERATODERMA: ICD-10-CM

## 2024-04-23 DIAGNOSIS — E11.65 TYPE 2 DIABETES MELLITUS WITH HYPERGLYCEMIA, UNSPECIFIED WHETHER LONG TERM INSULIN USE (HCC): Primary | ICD-10-CM

## 2024-04-23 PROCEDURE — 11056 PARNG/CUTG B9 HYPRKR LES 2-4: CPT | Performed by: PODIATRIST

## 2024-04-23 PROCEDURE — 99213 OFFICE O/P EST LOW 20 MIN: CPT | Performed by: PODIATRIST

## 2024-04-23 NOTE — PROGRESS NOTES
PATIENT:  Danial Dior    1985    ASSESSMENT:     1. Type 2 diabetes mellitus with hyperglycemia, unspecified whether long term insulin use (HCC)        2. Acquired keratoderma        3. Other acquired deformities of unspecified foot              PLAN:  1.  Reviewed medical records.  Patient was counseled on the condition and diagnosis.    2.  Educated disease prevention and risks related to diabetes.    3.  Educated proper daily foot care and exam.  Instructed proper skin care / protection and footwear.  Instructed to identify any signs of infection and related foot problem.    4.  The recent blood work was reviewed / discussed and the last HbA1c was 8.8.  Discussed proper blood glucose control with diet and exercise.    5. He has calluses due to foot deformity.  Lesions were trimmed.  Discussed surgical option for Tailor's bunion and he would think about his option.  Continue 40% Urea cream.  Instructed home exercise to address ankle equinus.    6. He has low risk diabetic foot and will return in 1 year for diabetic foot exam.      Imaging: I have personally reviewed pertinent films in PACS  Labs, pathology, and Other Studies: I have personally reviewed pertinent reports.        Procedure: All hyperkeratotic skin lesion(s) were sharply pared with a scalpel with no bleeding or evidence of ulceration.  Patient tolerated procedure(s) well without complications.      Subjective:          HPI  The patient presents for diabetic foot evaluation.  BS has been better.  The patient denied any history of diabetic foot ulcer or related foot infection.  No significant numbness.  He has mild tingling sensation in his feet on occasion.  Denied weakness or significant functional deficit.  He complained of pain associated with calluses in his feet.      The following portions of the patient's history were reviewed and updated as appropriate: allergies, current medications, past family history, past medical history,  past social history, past surgical history and problem list.  All pertinent labs and images were reviewed.    Past Medical History  Past Medical History:   Diagnosis Date    Acute pancreatitis 3/26/2023    Diabetes mellitus (HCC) 3/26/23    Hypertension 3/26/23    Obesity        Past Surgical History  Past Surgical History:   Procedure Laterality Date    KNEE SURGERY      MOUTH SURGERY      tongue    NO PAST SURGERIES      CO ARTHRS KNE SURG W/MENISCECTOMY MED/LAT W/SHVG Left 09/08/2020    Procedure: KNEE ARTHROSCOPY PARTIAL LATERAL MENISCECTOMY;  Surgeon: Lasha Magaña DO;  Location[facility];  Service: Orthopedics        Allergies:  Lantus [insulin glargine]    Medications:  Current Outpatient Medications   Medication Sig Dispense Refill    amLODIPine (NORVASC) 5 mg tablet Take 1 tablet (5 mg total) by mouth daily for 180 doses 90 tablet 0    BD Pen Needle Princess 2nd Gen 32G X 4 MM MISC USE TO INJECT ONCE DAILY 100 each 1    Blood Glucose Monitoring Suppl (OneTouch Verio) w/Device KIT Use 2 (two) times a day 1 kit 0    cyclobenzaprine (FLEXERIL) 5 mg tablet Take 1 tablet (5 mg total) by mouth 3 (three) times a day as needed for muscle spasms for up to 9 doses 9 tablet 0    dulaglutide (Trulicity) 0.75 MG/0.5ML injection Inject 0.5 mL (0.75 mg total) under the skin every 7 days 2 mL 5    insulin detemir (Levemir FlexPen) 100 Units/mL injection pen INJECT 20 UNITS UNDER THE SKIN DAILY AT BEDTIME 15 mL 5    Lancets (OneTouch Delica Plus [id number]) MISC USE 2 (TWO) TIMES A  each 1    metFORMIN (GLUCOPHAGE-XR) 500 mg 24 hr tablet TAKE ONE TABLET WITH BREAKFAST AND 2 TABS WITH DINNER 270 tablet 1    mometasone (ELOCON) 0.1 % cream Apply topically daily for 7 days 120 g 0    naproxen (Naprosyn) 500 mg tablet Take 1 tablet (500 mg total) by mouth 2 (two) times a day with meals for 14 doses 14 tablet 0    OneTouch Verio test strip USE 1 EACH 2 (TWO) TIMES A DAY USE AS INSTRUCTED 200 strip 1     No current  facility-administered medications for this visit.       Social History:  Social History     Socioeconomic History    Marital status: Single     Spouse name: None    Number of children: None    Years of education: None    Highest education level: None   Occupational History    None   Tobacco Use    Smoking status: Some Days     Current packs/day: 0.25     Average packs/day: 0.3 packs/day for 21.4 years (5.4 ttl pk-yrs)     Types: Cigarettes     Start date: 11/11/2002     Passive exposure: Past    Smokeless tobacco: Never   Vaping Use    Vaping status: Never Used   Substance and Sexual Activity    Alcohol use: Not Currently     Comment: 1 time per month 10 beers    Drug use: Never    Sexual activity: Yes     Partners: Female     Birth control/protection: Female Sterilization   Other Topics Concern    None   Social History Narrative    None     Social Determinants of Health     Financial Resource Strain: Not on file   Food Insecurity: Not on file   Transportation Needs: Not on file   Physical Activity: Not on file   Stress: Not on file   Social Connections: Not on file   Intimate Partner Violence: Not on file   Housing Stability: Not on file        Review of Systems   Constitutional:  Negative for chills and fever.   Respiratory:  Negative for cough and shortness of breath.    Cardiovascular:  Negative for chest pain and leg swelling.   Gastrointestinal:  Negative for nausea and vomiting.   Musculoskeletal:  Negative for gait problem.   Skin:  Negative for wound.   Neurological:  Negative for weakness and numbness.   Hematological: Negative.    Psychiatric/Behavioral:  Negative for confusion.          Objective:      /89 (BP Location: Right arm, Patient Position: Sitting, Cuff Size: Large)   Pulse 87          Physical Exam  Vitals reviewed.   Constitutional:       General: He is not in acute distress.     Appearance: He is obese. He is not toxic-appearing.   Cardiovascular:      Rate and Rhythm: Normal rate and  regular rhythm.      Pulses: Normal pulses. no weak pulses.           Dorsalis pedis pulses are 2+ on the right side and 2+ on the left side.        Posterior tibial pulses are 2+ on the right side and 2+ on the left side.   Pulmonary:      Effort: Pulmonary effort is normal. No respiratory distress.   Musculoskeletal:         General: Deformity present. No swelling, tenderness or signs of injury.      Right lower leg: No edema.      Left lower leg: No edema.      Right foot: No Charcot foot or foot drop.      Left foot: No Charcot foot or foot drop.      Comments: Mild hammertoe noted.  Tight achilles / calf with ankle equinus.  Tailor's bunion bilaterally with prominent 5th met head.     Feet:      Right foot:      Protective Sensation: 10 sites tested.  10 sites sensed.      Skin integrity: Callus present. No ulcer, skin breakdown or erythema.      Left foot:      Protective Sensation: 10 sites tested.  10 sites sensed.      Skin integrity: Callus present. No ulcer, skin breakdown or erythema.   Skin:     General: Skin is warm.      Capillary Refill: Capillary refill takes less than 2 seconds.      Coloration: Skin is not cyanotic or mottled.      Findings: No abscess, ecchymosis, erythema, rash or wound.      Nails: There is no clubbing.      Comments: Hyperkeratotic skin lesion on bilateral 5th met head plantarlaterally.   Neurological:      General: No focal deficit present.      Mental Status: He is alert and oriented to person, place, and time.      Cranial Nerves: No cranial nerve deficit.      Sensory: No sensory deficit.      Motor: No weakness.      Coordination: Coordination normal.      Gait: Gait normal.      Deep Tendon Reflexes: Reflexes normal.   Psychiatric:         Mood and Affect: Mood normal.         Behavior: Behavior normal.         Thought Content: Thought content normal.         Judgment: Judgment normal.           Diabetic Foot Exam    Patient's shoes and socks removed.    Right Foot/Ankle    Right Foot Inspection  Skin Exam: skin intact, callus and callus. No erythema, no maceration, no pre-ulcer and no ulcer.     Toe Exam: right toe deformity. No swelling, no tenderness and erythema    Sensory   Vibration: intact  Proprioception: intact  Monofilament testing: intact    Vascular  Capillary refills: < 3 seconds  The right DP pulse is 2+. The right PT pulse is 2+.     Left Foot/Ankle  Left Foot Inspection  Skin Exam: skin intact and callus. No erythema, no maceration, no pre-ulcer and no ulcer.     Toe Exam: left toe deformity. No swelling, no tenderness and no erythema.     Sensory   Vibration: intact  Proprioception: intact  Monofilament testing: intact    Vascular  Capillary refills: < 3 seconds  The left DP pulse is 2+. The left PT pulse is 2+.     Assign Risk Category  Deformity present  No loss of protective sensation  No weak pulses  Risk: 0

## 2024-05-13 ENCOUNTER — OFFICE VISIT (OUTPATIENT)
Dept: INTERNAL MEDICINE CLINIC | Facility: CLINIC | Age: 39
End: 2024-05-13
Payer: MEDICARE

## 2024-05-13 VITALS
OXYGEN SATURATION: 96 % | HEIGHT: 75 IN | BODY MASS INDEX: 39.17 KG/M2 | DIASTOLIC BLOOD PRESSURE: 70 MMHG | SYSTOLIC BLOOD PRESSURE: 114 MMHG | WEIGHT: 315 LBS | HEART RATE: 97 BPM

## 2024-05-13 DIAGNOSIS — I10 ESSENTIAL HYPERTENSION: ICD-10-CM

## 2024-05-13 DIAGNOSIS — R07.89 CHEST DISCOMFORT: ICD-10-CM

## 2024-05-13 DIAGNOSIS — E11.65 TYPE 2 DIABETES MELLITUS WITH HYPERGLYCEMIA, WITH LONG-TERM CURRENT USE OF INSULIN (HCC): ICD-10-CM

## 2024-05-13 DIAGNOSIS — Z79.4 TYPE 2 DIABETES MELLITUS WITH HYPERGLYCEMIA, WITH LONG-TERM CURRENT USE OF INSULIN (HCC): ICD-10-CM

## 2024-05-13 DIAGNOSIS — Z00.00 ANNUAL PHYSICAL EXAM: Primary | ICD-10-CM

## 2024-05-13 DIAGNOSIS — K21.9 GASTROESOPHAGEAL REFLUX DISEASE, UNSPECIFIED WHETHER ESOPHAGITIS PRESENT: ICD-10-CM

## 2024-05-13 DIAGNOSIS — R10.13 EPIGASTRIC PAIN: ICD-10-CM

## 2024-05-13 DIAGNOSIS — M54.41 ACUTE BILATERAL LOW BACK PAIN WITH RIGHT-SIDED SCIATICA: ICD-10-CM

## 2024-05-13 LAB — SL AMB POCT HEMOGLOBIN AIC: 9.7 (ref ?–6.5)

## 2024-05-13 PROCEDURE — 99395 PREV VISIT EST AGE 18-39: CPT | Performed by: INTERNAL MEDICINE

## 2024-05-13 PROCEDURE — 83036 HEMOGLOBIN GLYCOSYLATED A1C: CPT | Performed by: INTERNAL MEDICINE

## 2024-05-13 PROCEDURE — 99214 OFFICE O/P EST MOD 30 MIN: CPT | Performed by: INTERNAL MEDICINE

## 2024-05-13 PROCEDURE — 93000 ELECTROCARDIOGRAM COMPLETE: CPT | Performed by: INTERNAL MEDICINE

## 2024-05-13 RX ORDER — OMEPRAZOLE 20 MG/1
20 CAPSULE, DELAYED RELEASE ORAL 2 TIMES DAILY
Qty: 60 CAPSULE | Refills: 0 | Status: SHIPPED | OUTPATIENT
Start: 2024-05-13

## 2024-05-13 NOTE — ASSESSMENT & PLAN NOTE
-Symptoms possibly secondary to nerve root impingement  -A referral has been made to physical therapy  -If no improvement after 6 weeks consider advanced imaging in the form of an MRI

## 2024-05-13 NOTE — PROGRESS NOTES
ADULT ANNUAL PHYSICAL  [facility] - [facility]    NAME: Danial Dior  AGE: 38 y.o. SEX: male  : 1985     DATE: 2024     Assessment and Plan:     Problem List Items Addressed This Visit     Essential hypertension     -Blood pressure well controlled  -Continue current antihypertensive regimen           Gastroesophageal reflux disease     -Instructed patient to stay on Omeprazole BID for a total of 4-6 weeks   -If no improvement patient will be referred to GI for further evaluation            Relevant Medications    omeprazole (PriLOSEC) 20 mg delayed release capsule    Type 2 diabetes mellitus with hyperglycemia (HCC)     -A1c poorly controlled  -He reports he has an upcoming appointment with his endocrinologist within the next week  -I have increased his Trulicity dose 1.5 mg weekly    Lab Results   Component Value Date    HGBA1C 9.7 (A) 2024          Relevant Medications    dulaglutide (Trulicity) 1.5 MG/0.5ML injection    Other Relevant Orders    POCT hemoglobin A1c (Completed)    Comprehensive metabolic panel    Epigastric pain    Relevant Medications    omeprazole (PriLOSEC) 20 mg delayed release capsule    Other Relevant Orders    Lipase    Chest discomfort     -Patient has multiple cardiac risk factors  -Obtained in office EKG which revealed normal sinus rhythm  -An exercise stress test has been ordered         Relevant Orders    Stress test only, exercise    POCT ECG (Completed)    Acute bilateral low back pain with right-sided sciatica     -Symptoms possibly secondary to nerve root impingement  -A referral has been made to physical therapy  -If no improvement after 6 weeks consider advanced imaging in the form of an MRI         Relevant Orders    Ambulatory Referral to Physical Therapy   Other Visit Diagnoses     Annual physical exam    -  Primary    Relevant Orders    Comprehensive metabolic panel            Immunizations and preventive  care screenings were discussed with patient today. Appropriate education was printed on patient's after visit summary.         Return in about 3 months (around 8/13/2024).     Chief Complaint:     Chief Complaint   Patient presents with   • Physical Exam     Hospital visit  ( due to leg pain/ numbness on right leg)      History of Present Illness:     Adult Annual Physical   Patient here for a comprehensive physical exam.  His past medical history is notable for type 2 diabetes mellitus and GERD.    Regarding his type 2 diabetes mellitus he was last seen by endocrinology in July 2023.  He reports he has been consistent with taking his diabetic meds.  His last A1c was 8.8.    Today he complains of intermittent chest tightness/heaviness over the past 2 months.  He denies any chest pain with exertion.  He reports the discomfort often occurs at rest and lasts for 5 to 10 minutes before resolving.    He also complains of right leg pain with numbness and tingling.  His symptoms started 6 months ago.  He denies any inciting event.  He was initially evaluated in the ED after a week of symptoms on 12/7/2023.  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      Depression Screening  PHQ-2/9 Depression Screening    Little interest or pleasure in doing things: 1 - several days  Feeling down, depressed, or hopeless: 1 - several days          Review of Systems:     Review of Systems  All other systems negative except for pertinent findings noted in HPI.      Past Medical History:     Past Medical History:   Diagnosis Date   • Acute pancreatitis 3/26/2023   • Diabetes mellitus (HCC) 3/26/23   • Hypertension 3/26/23   • Obesity       Past Surgical History:     Past Surgical History:   Procedure Laterality Date   • KNEE SURGERY     • MOUTH SURGERY      tongue   • NO PAST SURGERIES     • TN ARTHRS KNE SURG W/MENISCECTOMY MED/LAT W/SHVG Left 09/08/2020    Procedure: KNEE ARTHROSCOPY PARTIAL LATERAL MENISCECTOMY;  Surgeon: Lasha  DO Archana;  Location: [facility];  Service: Orthopedics      Social History:     Social History     Socioeconomic History   • Marital status: Single     Spouse name: None   • Number of children: None   • Years of education: None   • Highest education level: None   Occupational History   • None   Tobacco Use   • Smoking status: Former     Current packs/day: 0.00     Average packs/day: 0.3 packs/day for 21.4 years (5.4 ttl pk-yrs)     Types: Cigarettes     Start date: 2002     Quit date: 2024     Years since quittin.0     Passive exposure: Past   • Smokeless tobacco: Never   Vaping Use   • Vaping status: Never Used   Substance and Sexual Activity   • Alcohol use: Yes     Alcohol/week: 10.0 standard drinks of alcohol     Types: 10 Cans of beer per week     Comment: 1 time per month 10 beers   • Drug use: Never   • Sexual activity: Yes     Partners: Female     Birth control/protection: Female Sterilization   Other Topics Concern   • None   Social History Narrative   • None     Social Determinants of Health     Financial Resource Strain: Not on file   Food Insecurity: Not on file   Transportation Needs: Not on file   Physical Activity: Not on file   Stress: Not on file   Social Connections: Not on file   Intimate Partner Violence: Not on file   Housing Stability: Not on file      Family History:     Family History   Problem Relation Age of Onset   • Hypertension Mother    • Hypertension Father    • Diabetes Father    • Diabetes type II Father       Current Medications:     Current Outpatient Medications   Medication Sig Dispense Refill   • amLODIPine (NORVASC) 5 mg tablet Take 1 tablet (5 mg total) by mouth daily for 180 doses 90 tablet 0   • BD Pen Needle Princess 2nd Gen 32G X 4 MM MISC USE TO INJECT ONCE DAILY 100 each 1   • Blood Glucose Monitoring Suppl (OneTouch Verio) w/Device KIT Use 2 (two) times a day 1 kit 0   • dulaglutide (Trulicity) 1.5 MG/0.5ML injection Inject 0.5 mL (1.5 mg total) under the  "skin every 7 days 6 mL 1   • insulin detemir (Levemir FlexPen) 100 Units/mL injection pen INJECT 20 UNITS UNDER THE SKIN DAILY AT BEDTIME 15 mL 5   • Lancets (OneTouch Delica Plus [id number]) MISC USE 2 (TWO) TIMES A  each 1   • metFORMIN (GLUCOPHAGE-XR) 500 mg 24 hr tablet TAKE ONE TABLET WITH BREAKFAST AND 2 TABS WITH DINNER 270 tablet 1   • omeprazole (PriLOSEC) 20 mg delayed release capsule Take 1 capsule (20 mg total) by mouth 2 (two) times a day 60 capsule 0   • OneTouch Verio test strip USE 1 EACH 2 (TWO) TIMES A DAY USE AS INSTRUCTED 200 strip 1   • mometasone (ELOCON) 0.1 % cream Apply topically daily for 7 days 120 g 0     No current facility-administered medications for this visit.      Allergies:     Allergies   Allergen Reactions   • Lantus [Insulin Glargine] Rash      Physical Exam:     /70   Pulse 97   Ht 6' 3\" (1.905 m)   Wt (!) 152 kg (335 lb)   SpO2 96%   BMI 41.87 kg/m²     Physical Exam   General: NAD   HEENT: NCAT, EOMI, normal conjunctiva  Cardiovascular: RRR, normal S1 and S2, no m/r/g  Pulmonary: Normal respiratory effort, no wheezes, rales or rhonchi  GI: Soft, epigastric pain with palpation, nondistended, normoactive bowel sounds  Musculoskeletal: Normal bulk and tone, tenderness to palpation over lumbar spine and right quadratus lumborum, 5 out of 5 strength in lower extremity muscle groups  Neuro: 1+ patellar and Achilles tendon DTRs bilaterally, sensation to light touch grossly intact throughout  Extremities: No lower extremity edema  Skin: Normal skin color, no rashes   Psychiatric: Normal mood and affect    Conrado Stephen MD   [facility]"

## 2024-05-13 NOTE — ASSESSMENT & PLAN NOTE
-Instructed patient to stay on Omeprazole BID for a total of 4-6 weeks   -If no improvement patient will be referred to GI for further evaluation

## 2024-05-13 NOTE — ASSESSMENT & PLAN NOTE
-Patient has multiple cardiac risk factors  -Obtained in office EKG which revealed normal sinus rhythm  -An exercise stress test has been ordered

## 2024-05-13 NOTE — PATIENT INSTRUCTIONS
-A referral has been made for physical therapy  -Given your complaints of chest discomfort a stress test has been ordered  -Keep your scheduled follow-up with endocrinology  -Blood work has been ordered  -Follow-up in 3 months    Wellness Visit for Adults   AMBULATORY CARE:   A wellness visit  is when you see your healthcare provider to get screened for health problems. Your healthcare provider will also give you advice on how to stay healthy. Write down your questions so you remember to ask them. Ask your healthcare provider how often you should have a wellness visit.  What happens at a wellness visit:  Your healthcare provider will ask about your health, and your family history of health problems. This includes high blood pressure, heart disease, and cancer. He or she will ask if you have symptoms that concern you, if you smoke, and about your mood. You may also be asked about your intake of medicines, supplements, food, and alcohol. Any of the following may be done:  Your weight  will be checked. Your height may also be checked so your body mass index (BMI) can be calculated. Your BMI shows if you are at a healthy weight.    Your blood pressure  and heart rate will be checked. Your temperature may also be checked.    Blood and urine tests  may be done. Blood tests may be done to check your cholesterol levels. Abnormal cholesterol levels increase your risk for heart disease and stroke. You may also need a blood or urine test to check for diabetes if you are at increased risk. Urine tests may be done to look for signs of an infection or kidney disease.    A physical exam  includes checking your heartbeat and lungs with a stethoscope. Your healthcare provider may also check your skin to look for sun damage.    Screening tests  may be recommended. A screening test is done to check for diseases that may not cause symptoms. The screening tests you may need depend on your age, gender, family history, and lifestyle  habits. For example, colorectal screening may be recommended if you are 50 years old or older.    Screening tests you need if you are a woman:   A Pap smear  is used to screen for cervical cancer. Pap smears are usually done every 3 to 5 years depending on your age. You may need them more often if you have had abnormal Pap smear test results in the past. Ask your healthcare provider how often you should have a Pap smear.    A mammogram  is an x-ray of your breasts to screen for breast cancer. Experts recommend mammograms every 2 years starting at age 50 years. You may need a mammogram at age 49 years or younger if you have an increased risk for breast cancer. Talk to your healthcare provider about when you should start having mammograms and how often you need them.    Vaccines you may need:   Get an influenza vaccine  every year. The influenza vaccine protects you from the flu. Several types of viruses cause the flu. The viruses change over time, so new vaccines are made each year.    Get a tetanus-diphtheria (Td) booster vaccine  every 10 years. This vaccine protects you against tetanus and diphtheria. Tetanus is a severe infection that may cause painful muscle spasms and lockjaw. Diphtheria is a severe bacterial infection that causes a thick covering in the back of your mouth and throat.    Get a human papillomavirus (HPV) vaccine  if you are female and aged 19 to 26 or male 19 to 21 and never received it. This vaccine protects you from HPV infection. HPV is the most common infection spread by sexual contact. HPV may also cause vaginal, penile, and anal cancers.    Get a pneumococcal vaccine  if you are aged 65 years or older. The pneumococcal vaccine is an injection given to protect you from pneumococcal disease. Pneumococcal disease is an infection caused by pneumococcal bacteria. The infection may cause pneumonia, meningitis, or an ear infection.    Get a shingles vaccine  if you are 60 or older, even if you  have had shingles before. The shingles vaccine is an injection to protect you from the varicella-zoster virus. This is the same virus that causes chickenpox. Shingles is a painful rash that develops in people who had chickenpox or have been exposed to the virus.    How to eat healthy:  My Plate is a model for planning healthy meals. It shows the types and amounts of foods that should go on your plate. Fruits and vegetables make up about half of your plate, and grains and protein make up the other half. A serving of dairy is included on the side of your plate. The amount of calories and serving sizes you need depends on your age, gender, weight, and height. Examples of healthy foods are listed below:  Eat a variety of vegetables  such as dark green, red, and orange vegetables. You can also include canned vegetables low in sodium (salt) and frozen vegetables without added butter or sauces.    Eat a variety of fresh fruits , canned fruit in 100% juice, frozen fruit, and dried fruit.    Include whole grains.  At least half of the grains you eat should be whole grains. Examples include whole-wheat bread, wheat pasta, brown rice, and whole-grain cereals such as oatmeal.    Eat a variety of protein foods such as seafood (fish and shellfish), lean meat, and poultry without skin (turkey and chicken). Examples of lean meats include pork leg, shoulder, or tenderloin, and beef round, sirloin, tenderloin, and extra lean ground beef. Other protein foods include eggs and egg substitutes, beans, peas, soy products, nuts, and seeds.    Choose low-fat dairy products such as skim or 1% milk or low-fat yogurt, cheese, and cottage cheese.    Limit unhealthy fats  such as butter, hard margarine, and shortening.       Exercise:  Exercise at least 30 minutes per day on most days of the week. Some examples of exercise include walking, biking, dancing, and swimming. You can also fit in more physical activity by taking the stairs instead of  the elevator or parking farther away from stores. Include muscle strengthening activities 2 days each week. Regular exercise provides many health benefits. It helps you manage your weight, and decreases your risk for type 2 diabetes, heart disease, stroke, and high blood pressure. Exercise can also help improve your mood. Ask your healthcare provider about the best exercise plan for you.       General health and safety guidelines:   Do not smoke.  Nicotine and other chemicals in cigarettes and cigars can cause lung damage. Ask your healthcare provider for information if you currently smoke and need help to quit. E-cigarettes or smokeless tobacco still contain nicotine. Talk to your healthcare provider before you use these products.    Limit alcohol.  A drink of alcohol is 12 ounces of beer, 5 ounces of wine, or 1½ ounces of liquor.    Lose weight, if needed.  Being overweight increases your risk of certain health conditions. These include heart disease, high blood pressure, type 2 diabetes, and certain types of cancer.    Protect your skin.  Do not sunbathe or use tanning beds. Use sunscreen with a SPF 15 or higher. Apply sunscreen at least 15 minutes before you go outside. Reapply sunscreen every 2 hours. Wear protective clothing, hats, and sunglasses when you are outside.    Drive safely.  Always wear your seatbelt. Make sure everyone in your car wears a seatbelt. A seatbelt can save your life if you are in an accident. Do not use your cell phone when you are driving. This could distract you and cause an accident. Pull over if you need to make a call or send a text message.    Practice safe sex.  Use latex condoms if are sexually active and have more than one partner. Your healthcare provider may recommend screening tests for sexually transmitted infections (STIs).    Wear helmets, lifejackets, and protective gear.  Always wear a helmet when you ride a bike or motorcycle, go skiing, or play sports that could cause  a head injury. Wear protective equipment when you play sports. Wear a lifejacket when you are on a boat or doing water sports.    © Copyright [employer] 2023 Information is for End User's use only and may not be sold, redistributed or otherwise used for commercial purposes.  The above information is an  only. It is not intended as medical advice for individual conditions or treatments. Talk to your doctor, nurse or pharmacist before following any medical regimen to see if it is safe and effective for you.

## 2024-05-13 NOTE — ASSESSMENT & PLAN NOTE
-A1c poorly controlled  -He reports he has an upcoming appointment with his endocrinologist within the next week  -I have increased his Trulicity dose 1.5 mg weekly    Lab Results   Component Value Date    HGBA1C 9.7 (A) 05/13/2024

## 2024-05-15 ENCOUNTER — OFFICE VISIT (OUTPATIENT)
Dept: ENDOCRINOLOGY | Facility: CLINIC | Age: 39
End: 2024-05-15
Payer: MEDICARE

## 2024-05-15 VITALS
HEIGHT: 75 IN | SYSTOLIC BLOOD PRESSURE: 118 MMHG | WEIGHT: 315 LBS | OXYGEN SATURATION: 94 % | DIASTOLIC BLOOD PRESSURE: 80 MMHG | HEART RATE: 106 BPM | BODY MASS INDEX: 39.17 KG/M2

## 2024-05-15 DIAGNOSIS — E78.2 MIXED HYPERLIPIDEMIA: ICD-10-CM

## 2024-05-15 DIAGNOSIS — E11.65 TYPE 2 DIABETES MELLITUS WITH HYPERGLYCEMIA, WITH LONG-TERM CURRENT USE OF INSULIN (HCC): Primary | ICD-10-CM

## 2024-05-15 DIAGNOSIS — Z79.4 TYPE 2 DIABETES MELLITUS WITH HYPERGLYCEMIA, WITH LONG-TERM CURRENT USE OF INSULIN (HCC): Primary | ICD-10-CM

## 2024-05-15 DIAGNOSIS — I10 ESSENTIAL HYPERTENSION: ICD-10-CM

## 2024-05-15 PROCEDURE — 99214 OFFICE O/P EST MOD 30 MIN: CPT | Performed by: PHYSICIAN ASSISTANT

## 2024-05-15 RX ORDER — INSULIN GLARGINE 300 U/ML
20 INJECTION, SOLUTION SUBCUTANEOUS
Qty: 3 ML | Refills: 1 | Status: SHIPPED | OUTPATIENT
Start: 2024-05-15

## 2024-05-15 RX ORDER — METFORMIN HYDROCHLORIDE 500 MG/1
TABLET, EXTENDED RELEASE ORAL
Qty: 360 TABLET | Refills: 1 | Status: SHIPPED | OUTPATIENT
Start: 2024-05-15

## 2024-05-15 NOTE — PROGRESS NOTES
Patient Progress Note      CC: DM      Referring Provider  Conrado Stephen Md  [address] [id number]     History of Present Illness:   Danial Dior is a 38 y.o. male with a history of type 2 diabetes with long term use of insulin. Diabetes course has been stable.  He was in the ER in March 2023 for abdominal pain at which time he became aware of his diagnosis of DM.  Denies any issues with his current regimen. Home glucose monitoring: are performed regularly, 2 times a day      No log or meter today  Home blood glucose readings: states readings range from 200-300s mg/dl     Current regimen: Levemir 20 units QHS, metformin 500 mg in AM and 1000 mg in PM, Trulicity 1.5 mg weekly (currently taking 0.75 mg weekly but PCP just increased to 1.5 mg which he still needs to )  Had a rash with Lantus  compliant most of the time, denies any side effects from medications.  Injects in: abdomen. Rotates sites: Yes  Hypoglycemic episodes: No, never  H/o of hypoglycemia causing hospitalization or Intervention such as glucagon injection  or ambulance call :  No  Hypoglycemia symptoms: never less than 70 mg/dl   Treatment of hypoglycemia:  Discussed treatment      Medic alert tag: recommended: Yes     Diabetes education: No  Diet: 2-3 meals per day, 1-2 snacks per day. Timing of meals is predictable.  Diabetic diet compliance:  noncompliant some of the time  Activity: Daily activity is predictable: Yes. He tries to stay active but could improve with exercise.      Ophthamology: March 2023  Podiatry: April 2024     Not on ACE inhibitor/ARB  Not on statin   Thyroid disorders: No  History of pancreatitis: No    Patient Active Problem List   Diagnosis    Patellar tendon rupture, left, initial encounter    Acute lateral meniscus tear of left knee    Patellar tendinosis    Tobacco use    S/P arthroscopy of left knee    Patellofemoral pain syndrome of left knee    Internal derangement of knee  joint, left    History of meniscectomy of left knee    Patellofemoral arthritis of left knee    Primary osteoarthritis of left knee    Patellofemoral disorder of both knees    Patella wanda    Contusion of right knee    Hepatic steatosis    Essential hypertension    Gastroesophageal reflux disease    Type 2 diabetes mellitus with hyperglycemia (HCC)    Dermatitis    Fatigue    Epigastric pain    Chest discomfort    Acute bilateral low back pain with right-sided sciatica    Mixed hyperlipidemia      Past Medical History:   Diagnosis Date    Acute pancreatitis 3/26/2023    Diabetes mellitus (HCC) 3/26/23    Hypertension 3/26/23    Obesity       Past Surgical History:   Procedure Laterality Date    KNEE SURGERY      MOUTH SURGERY      tongue    NO PAST SURGERIES      [place] [facility] KNE SURG W/MENISCECTOMY MED/LAT W/SHVG Left 2020    Procedure: KNEE ARTHROSCOPY PARTIAL LATERAL MENISCECTOMY;  Surgeon: Lasha Magaña DO;  Location: [facility];  Service: Orthopedics      Family History   Problem Relation Age of Onset    Hypertension Mother     Hypertension Father     Diabetes Father     Diabetes type II Father      Social History     Tobacco Use    Smoking status: Former     Current packs/day: 0.00     Average packs/day: 0.3 packs/day for 21.4 years (5.4 ttl pk-yrs)     Types: Cigarettes     Start date: 2002     Quit date: 2024     Years since quittin.0     Passive exposure: Past    Smokeless tobacco: Never   Substance Use Topics    Alcohol use: Yes     Alcohol/week: 10.0 standard drinks of alcohol     Types: 10 Cans of beer per week     Comment: 1 time per month 10 beers     Allergies   Allergen Reactions    Lantus [Insulin Glargine] Rash         Current Outpatient Medications:     amLODIPine (NORVASC) 5 mg tablet, Take 1 tablet (5 mg total) by mouth daily for 180 doses, Disp: 90 tablet, Rfl: 0    BD Pen Needle Princess 2nd Gen 32G X 4 MM MISC, USE TO INJECT ONCE DAILY, Disp: 100 each, Rfl: 1    Blood Glucose  "Monitoring Suppl (OneTouch Verio) w/Device KIT, Use 2 (two) times a day, Disp: 1 kit, Rfl: 0    dulaglutide (Trulicity) 1.5 MG/0.5ML injection, Inject 0.5 mL (1.5 mg total) under the skin every 7 days, Disp: 6 mL, Rfl: 1    insulin detemir (Levemir FlexPen) 100 Units/mL injection pen, INJECT 20 UNITS UNDER THE SKIN DAILY AT BEDTIME, Disp: 15 mL, Rfl: 5    Lancets (OneTouch Delica Plus Ruxana62G) MISC, USE 2 (TWO) TIMES A DAY, Disp: 200 each, Rfl: 1    metFORMIN (GLUCOPHAGE-XR) 500 mg 24 hr tablet, TAKE ONE TABLET WITH BREAKFAST AND 2 TABS WITH DINNER, Disp: 270 tablet, Rfl: 1    omeprazole (PriLOSEC) 20 mg delayed release capsule, Take 1 capsule (20 mg total) by mouth 2 (two) times a day, Disp: 60 capsule, Rfl: 0    OneTouch Verio test strip, USE 1 EACH 2 (TWO) TIMES A DAY USE AS INSTRUCTED, Disp: 200 strip, Rfl: 1    mometasone (ELOCON) 0.1 % cream, Apply topically daily for 7 days, Disp: 120 g, Rfl: 0  Review of Systems   Constitutional:  Negative for activity change, appetite change, fatigue and unexpected weight change.   HENT:  Negative for trouble swallowing.    Eyes:  Negative for visual disturbance.   Respiratory:  Negative for shortness of breath.    Cardiovascular:  Positive for chest pain (PCP ordered stress test). Negative for palpitations.   Gastrointestinal:  Negative for constipation and diarrhea.   Endocrine: Negative for polydipsia and polyuria.   Musculoskeletal:  Positive for back pain (intermittent recently).   Skin:  Negative for wound.   Neurological:  Positive for numbness (LE due to back issue).   Psychiatric/Behavioral: Negative.         Physical Exam:  Body mass index is 41.25 kg/m².  /80   Pulse (!) 106   Ht 6' 3\" (1.905 m)   Wt (!) 150 kg (330 lb)   SpO2 94%   BMI 41.25 kg/m²    Wt Readings from Last 3 Encounters:   05/15/24 (!) 150 kg (330 lb)   05/13/24 (!) 152 kg (335 lb)   11/06/23 (!) 154 kg (340 lb)       Physical Exam  Vitals and nursing note reviewed.   Constitutional:  " "     Appearance: He is well-developed.   HENT:      Head: Normocephalic.   Eyes:      General: No scleral icterus.     Extraocular Movements: EOM normal.      Pupils: Pupils are equal, round, and reactive to light.   Neck:      Thyroid: No thyromegaly.   Cardiovascular:      Rate and Rhythm: Normal rate and regular rhythm.      Pulses:           Radial pulses are 2+ on the right side and 2+ on the left side.      Heart sounds: No murmur heard.  Pulmonary:      Effort: Pulmonary effort is normal. No respiratory distress.      Breath sounds: Normal breath sounds. No wheezing.   Musculoskeletal:      Cervical back: Neck supple.   Skin:     General: Skin is warm and dry.   Neurological:      Mental Status: He is alert.   Psychiatric:         Mood and Affect: Mood and affect normal.     Patient's shoes and socks were not removed.        Labs:   Lab Results   Component Value Date    HGBA1C 9.7 (A) 05/13/2024     Lab Results   Component Value Date    CALCIUM 8.9 07/11/2023    K 3.9 07/11/2023    CO2 26 07/11/2023     07/11/2023    BUN 18 07/11/2023    CREATININE 0.83 07/11/2023     No results found for: \"MICROALBUR\", \"FBAW38NHG\"  eGFR   Date Value Ref Range Status   07/11/2023 112 ml/min/1.73sq m Final     No components found for: \"MALBCRER\"  Lab Results   Component Value Date    HDL 39 (L) 07/11/2023    TRIG 261 (H) 07/11/2023     Lab Results   Component Value Date    ALT 24 03/23/2023    AST 12 (L) 03/23/2023    ALKPHOS 69 03/23/2023     No results found for: \"FLZ7VVAZRWDG\", \"TSH\", \"I5WBQLT\", \"S0HCNAQ\", \"THYROIDAB\"        Plan:    Diagnoses and all orders for this visit:    Type 2 diabetes mellitus with hyperglycemia, with long-term current use of insulin ([facility])  HGA1C 9.7%.   Treatment regimen: Switch Levemir to Toujeo. Take Toujeo 20 units QHS. Increase metformin to 1000 mg BID. Start Jardiance 25 mg daily. Discussed SE, safety/ efficacy. Hydrate well. Continue Trulicity 1.5 mg weekly. Discussed trying Mounjaro in " place of Trulicity; he will consider it and let us know.   Discussed intensive insulin regimen does increase risk for hypoglycemia. Episodes of hypoglycemia can lead to permanent disability and death.  Discussed risks/complications associated with uncontrolled diabetes.    Advised to adhere to diabetic diet, and recommended staying active/exercising routinely as tolerated.  Keep carbohydrates consistent to limit blood glucose fluctuations.  Advised to call if blood sugars less than 70 mg/dl or over 300 mg/dl.   Check blood glucose 3+ times a day  Discussed symptoms and treatment of hypoglycemia.   Referred to diabetes/nutrition education.   Recommended routine follow-up with podiatry and ophthalmology.   Send log in 2 weeks.    Ordered blood work to complete prior to next visit.  -     Basic metabolic panel; Future  -     Albumin / creatinine urine ratio; Future    Essential hypertension  Blood pressure well controlled, continue current treatment     Mixed hyperlipidemia  Reduce intake of saturated fats  Recommended healthy diet and routine exercise as tolerated  Not on statin therapy  If levels remain elevated, will need to consider statin therapy  -     Lipid panel; Future                 Discussed with the patient diagnosis and treatment and all questions fully answered. He will call me if any problems arise.    Counseled patient on diagnostic results, prognosis, risk and benefit of treatment options, instruction for management, importance of treatment compliance, risk factor reduction and impressions.      Jeimy Costa PA-C

## 2024-05-15 NOTE — PATIENT INSTRUCTIONS
Hypoglycemia instructions   Danialisacc Dior  5/15/2024  [id number]    Low Blood Sugar    Steps to treat low blood sugar.    1. Test blood sugar if you have symptoms of low blood sugar:   Low Blood Sugar Symptoms:  o Sweaty  o Dizzy  o Rapid heartbeat  o Shaky  o Bad mood  o Hungry      2. Treat blood sugar less than 70 with 15 grams of fast-acting carbohydrate:   Examples of 15 grams Fast-Acting Carbohydrate:  o 4 oz juice  o 4 oz regular soda  o 3-4 glucose tablets (chew)  o 3-4 hard candies (chew)          3.  Wait 15 minutes and test your blood sugar again     4. If blood sugar is less than 100, repeat steps 2-3.    5. When your blood sugar is 100 or more, eat a snack if it will be longer than one hour until your next meal. The snack should be 15 grams of carbohydrate and a protein:   Examples of snacks:  o ½ sandwich  o 6 crackers with cheese  o Piece of fruit with cheese or peanut butter  o 6 crackers with peanut butter

## 2024-05-29 ENCOUNTER — HOSPITAL ENCOUNTER (OUTPATIENT)
Dept: NON INVASIVE DIAGNOSTICS | Facility: CLINIC | Age: 39
Discharge: HOME | End: 2024-05-29
Payer: MEDICARE

## 2024-05-29 VITALS
HEIGHT: 75 IN | OXYGEN SATURATION: 98 % | BODY MASS INDEX: 39.17 KG/M2 | SYSTOLIC BLOOD PRESSURE: 128 MMHG | WEIGHT: 315 LBS | DIASTOLIC BLOOD PRESSURE: 82 MMHG | HEART RATE: 93 BPM

## 2024-05-29 DIAGNOSIS — R07.89 CHEST DISCOMFORT: ICD-10-CM

## 2024-05-29 LAB
MAX HR PERCENT: 93 %
MAX HR: 171 BPM
RATE PRESSURE PRODUCT: NORMAL
SL CV STRESS RECOVERY BP: NORMAL MMHG
SL CV STRESS RECOVERY HR: 113 BPM
SL CV STRESS RECOVERY O2 SAT: 98 %
SL CV STRESS STAGE REACHED: 2
STRESS ANGINA INDEX: 0
STRESS BASELINE BP: NORMAL MMHG
STRESS BASELINE HR: 93 BPM
STRESS O2 SAT REST: 98 %
STRESS PEAK HR: 168 BPM
STRESS POST ESTIMATED WORKLOAD: 7 METS
STRESS POST EXERCISE DUR MIN: 6 MIN
STRESS POST EXERCISE DUR SEC: 0 SEC
STRESS POST O2 SAT PEAK: 98 %
STRESS POST PEAK BP: 171 MMHG

## 2024-05-29 PROCEDURE — 93016 CV STRESS TEST SUPVJ ONLY: CPT | Performed by: INTERNAL MEDICINE

## 2024-05-29 PROCEDURE — 93017 CV STRESS TEST TRACING ONLY: CPT

## 2024-05-29 PROCEDURE — 93018 CV STRESS TEST I&R ONLY: CPT | Performed by: INTERNAL MEDICINE

## 2024-05-30 LAB
CHEST PAIN STATEMENT: NORMAL
MAX DIASTOLIC BP: 72 MMHG
MAX PREDICTED HEART RATE: 182 BPM
PROTOCOL NAME: NORMAL
STRESS POST EXERCISE DUR MIN: 6 MIN
STRESS POST EXERCISE DUR SEC: 0 SEC
STRESS POST PEAK HR: 171 BPM
STRESS POST PEAK SYSTOLIC BP: 168 MMHG
TARGET HR FORMULA: NORMAL
TEST INDICATION: NORMAL

## 2024-06-06 DIAGNOSIS — K21.9 GASTROESOPHAGEAL REFLUX DISEASE, UNSPECIFIED WHETHER ESOPHAGITIS PRESENT: ICD-10-CM

## 2024-06-06 DIAGNOSIS — R10.13 EPIGASTRIC PAIN: ICD-10-CM

## 2024-06-06 RX ORDER — OMEPRAZOLE 20 MG/1
20 CAPSULE, DELAYED RELEASE ORAL 2 TIMES DAILY
Qty: 180 CAPSULE | Refills: 1 | Status: SHIPPED | OUTPATIENT
Start: 2024-06-06

## 2024-06-27 DIAGNOSIS — Z79.4 TYPE 2 DIABETES MELLITUS WITH HYPERGLYCEMIA, WITH LONG-TERM CURRENT USE OF INSULIN (HCC): ICD-10-CM

## 2024-06-27 DIAGNOSIS — E11.65 TYPE 2 DIABETES MELLITUS WITH HYPERGLYCEMIA, WITH LONG-TERM CURRENT USE OF INSULIN (HCC): ICD-10-CM

## 2024-06-27 RX ORDER — METFORMIN HYDROCHLORIDE 500 MG/1
TABLET, EXTENDED RELEASE ORAL
Qty: 360 TABLET | Refills: 1 | Status: SHIPPED | OUTPATIENT
Start: 2024-06-27

## 2024-07-15 DIAGNOSIS — I10 PRIMARY HYPERTENSION: ICD-10-CM

## 2024-07-15 RX ORDER — AMLODIPINE BESYLATE 5 MG/1
5 TABLET ORAL DAILY
Qty: 100 TABLET | Refills: 1 | Status: SHIPPED | OUTPATIENT
Start: 2024-07-15 | End: 2025-01-31

## 2024-07-28 DIAGNOSIS — Z79.4 TYPE 2 DIABETES MELLITUS WITH HYPERGLYCEMIA, WITH LONG-TERM CURRENT USE OF INSULIN (HCC): ICD-10-CM

## 2024-07-28 DIAGNOSIS — E11.65 TYPE 2 DIABETES MELLITUS WITH HYPERGLYCEMIA, WITH LONG-TERM CURRENT USE OF INSULIN (HCC): ICD-10-CM

## 2024-08-14 ENCOUNTER — LAB (OUTPATIENT)
Dept: LAB | Facility: AMBULARY SURGERY CENTER | Age: 39
End: 2024-08-14
Payer: MEDICARE

## 2024-08-14 DIAGNOSIS — E11.65 TYPE 2 DIABETES MELLITUS WITH HYPERGLYCEMIA, WITH LONG-TERM CURRENT USE OF INSULIN (HCC): ICD-10-CM

## 2024-08-14 DIAGNOSIS — Z79.4 TYPE 2 DIABETES MELLITUS WITH HYPERGLYCEMIA, WITH LONG-TERM CURRENT USE OF INSULIN (HCC): ICD-10-CM

## 2024-08-14 DIAGNOSIS — E78.2 MIXED HYPERLIPIDEMIA: ICD-10-CM

## 2024-08-14 LAB
ANION GAP SERPL CALCULATED.3IONS-SCNC: 10 MMOL/L (ref 4–13)
BUN SERPL-MCNC: 18 MG/DL (ref 5–25)
CALCIUM SERPL-MCNC: 8.9 MG/DL (ref 8.4–10.2)
CHLORIDE SERPL-SCNC: 105 MMOL/L (ref 96–108)
CHOLEST SERPL-MCNC: 173 MG/DL
CO2 SERPL-SCNC: 24 MMOL/L (ref 21–32)
CREAT SERPL-MCNC: 0.77 MG/DL (ref 0.6–1.3)
CREAT UR-MCNC: 127.3 MG/DL
GFR SERPL CREATININE-BSD FRML MDRD: 115 ML/MIN/1.73SQ M
GLUCOSE P FAST SERPL-MCNC: 121 MG/DL (ref 65–99)
HDLC SERPL-MCNC: 36 MG/DL
LDLC SERPL CALC-MCNC: 85 MG/DL (ref 0–100)
MICROALBUMIN UR-MCNC: <7 MG/L
NONHDLC SERPL-MCNC: 137 MG/DL
POTASSIUM SERPL-SCNC: 3.8 MMOL/L (ref 3.5–5.3)
SODIUM SERPL-SCNC: 139 MMOL/L (ref 135–147)
TRIGL SERPL-MCNC: 260 MG/DL

## 2024-08-14 PROCEDURE — 82570 ASSAY OF URINE CREATININE: CPT

## 2024-08-14 PROCEDURE — 80048 BASIC METABOLIC PNL TOTAL CA: CPT

## 2024-08-14 PROCEDURE — 82043 UR ALBUMIN QUANTITATIVE: CPT

## 2024-08-14 PROCEDURE — 80061 LIPID PANEL: CPT

## 2024-08-14 PROCEDURE — 36415 COLL VENOUS BLD VENIPUNCTURE: CPT

## 2024-08-15 ENCOUNTER — OFFICE VISIT (OUTPATIENT)
Dept: INTERNAL MEDICINE CLINIC | Facility: CLINIC | Age: 39
End: 2024-08-15
Payer: MEDICARE

## 2024-08-15 VITALS
BODY MASS INDEX: 41.47 KG/M2 | SYSTOLIC BLOOD PRESSURE: 118 MMHG | DIASTOLIC BLOOD PRESSURE: 68 MMHG | OXYGEN SATURATION: 98 % | WEIGHT: 315 LBS | HEART RATE: 106 BPM

## 2024-08-15 DIAGNOSIS — E11.65 TYPE 2 DIABETES MELLITUS WITH HYPERGLYCEMIA, WITH LONG-TERM CURRENT USE OF INSULIN (HCC): Primary | ICD-10-CM

## 2024-08-15 DIAGNOSIS — K21.9 GASTROESOPHAGEAL REFLUX DISEASE, UNSPECIFIED WHETHER ESOPHAGITIS PRESENT: ICD-10-CM

## 2024-08-15 DIAGNOSIS — I10 ESSENTIAL HYPERTENSION: ICD-10-CM

## 2024-08-15 DIAGNOSIS — Z79.4 TYPE 2 DIABETES MELLITUS WITH HYPERGLYCEMIA, WITH LONG-TERM CURRENT USE OF INSULIN (HCC): Primary | ICD-10-CM

## 2024-08-15 LAB — SL AMB POCT HEMOGLOBIN AIC: 6.2 (ref ?–6.5)

## 2024-08-15 PROCEDURE — 99214 OFFICE O/P EST MOD 30 MIN: CPT | Performed by: INTERNAL MEDICINE

## 2024-08-15 PROCEDURE — 83036 HEMOGLOBIN GLYCOSYLATED A1C: CPT | Performed by: INTERNAL MEDICINE

## 2024-08-15 NOTE — PROGRESS NOTES
Name: Danial Dior      : 1985      MRN: [id number]  Encounter Provider: Conrado Stephen MD  Encounter Date: 8/15/2024   Encounter department: [facility]    Assessment & Plan     1. Type 2 diabetes mellitus with hyperglycemia, with long-term current use of insulin ([facility])  Assessment & Plan:  -Well controlled   -Continue current regimen   -Appreciate Endocrinology follow-up     Lab Results   Component Value Date    HGBA1C 6.2 08/15/2024     Orders:  -     POCT hemoglobin A1c  2. Gastroesophageal reflux disease, unspecified whether esophagitis present  Assessment & Plan:  -Well controlled   -Instructed patient to decrease Prilosec to once daily dosing   Orders:  -     omeprazole (PriLOSEC) 20 mg delayed release capsule; Take 1 capsule (20 mg total) by mouth daily  3. Essential hypertension  Assessment & Plan:  -Blood pressure well controlled  -Continue current antihypertensive regimen           Subjective      HPI  Patient presents today for chronic follow-up.  His past medical history is most notable for type 2 diabetes mellitus, hypertension and GERD.  Overall the patient reports he is doing well.  He reports chest discomfort that he was previously experiencing has completely resolved.  He underwent a cardiac stress test in May which was negative for ischemia.    He states since increasing his Trulicity dose to 1.5 mg, increasing his metformin and starting Jardiance that his blood sugars have significantly improved.  He reports over the past few weeks when checking postprandial readings they have been ranging from  he denies any episodes of hypoglycemia..      All other systems negative except for pertinent findings noted in HPI.       Current Outpatient Medications on File Prior to Visit   Medication Sig   • amLODIPine (NORVASC) 5 mg tablet Take 1 tablet (5 mg total) by mouth daily for 200 doses   • BD Pen Needle Princess 2nd Gen 32G X 4 MM MISC USE TO INJECT ONCE DAILY    • Blood Glucose Monitoring Suppl (OneTouch Verio) w/Device KIT Use 2 (two) times a day   • dulaglutide (Trulicity) 1.5 MG/0.5ML injection Inject 0.5 mL (1.5 mg total) under the skin every 7 days   • Empagliflozin (Jardiance) 25 MG TABS Take 1 tablet (25 mg total) by mouth every morning   • insulin glargine (Toujeo SoloStar) 300 units/mL CONCENTRATED U-300 injection pen (1-unit dial) Inject 20 Units under the skin daily at bedtime   • Lancets (OneTouch Delica Plus Acywgt39T) MISC USE 2 (TWO) TIMES A DAY   • metFORMIN (GLUCOPHAGE-XR) 500 mg 24 hr tablet Take 2 tablets twice daily with meals   • OneTouch Verio test strip USE 1 EACH 2 (TWO) TIMES A DAY USE AS INSTRUCTED   • [DISCONTINUED] omeprazole (PriLOSEC) 20 mg delayed release capsule TAKE 1 CAPSULE BY MOUTH TWICE A DAY   • mometasone (ELOCON) 0.1 % cream Apply topically daily for 7 days       Objective     /68 (BP Location: Left arm, Patient Position: Sitting, Cuff Size: Large)   Pulse (!) 106   Wt (!) 151 kg (331 lb 12.8 oz)   SpO2 98%   BMI 41.47 kg/m²     BP Readings from Last 3 Encounters:   08/15/24 118/68   05/29/24 128/82   05/15/24 118/80        Wt Readings from Last 3 Encounters:   08/15/24 (!) 151 kg (331 lb 12.8 oz)   05/29/24 (!) 150 kg (330 lb)   05/15/24 (!) 150 kg (330 lb)       Physical Exam    General: NAD  HEENT: NCAT, EOMI, normal conjunctiva  Cardiovascular: RRR, normal S1 and S2, no m/r/g  Pulmonary: Normal respiratory effort, no wheezes, rales or rhonchi  Extremities: No lower extremity edema  Skin: Normal skin color, no rashes     Conrado Stephen MD

## 2024-08-15 NOTE — PATIENT INSTRUCTIONS
"Patient Education     Type 2 diabetes   The Basics   Written by the doctors and editors at [facility]   What is type 2 diabetes? -- This is a disorder that disrupts the way the body uses sugar. It is sometimes called type 2 diabetes mellitus.  All of the cells in the body need sugar to work normally. Sugar gets into the cells with the help of a hormone called insulin. Insulin is made by the pancreas, an organ in the belly. If there is not enough insulin, or if cells in the body don't respond normally to insulin, sugar builds up in the blood. That is what happens to people with diabetes.  There are 2 different types of diabetes:   In type 1 diabetes, the pancreas makes little or no insulin.   In type 2 diabetes, the pancreas still makes some insulin, but the cells in the body stop responding normally. Eventually, the pancreas cannot make enough insulin to keep up.  Having excess body weight or obesity increases a person's risk of developing type 2 diabetes. But people without excess body weight can get diabetes, too.  What are the symptoms of type 2 diabetes? -- Type 2 diabetes usually causes no symptoms. When symptoms do happen, they include:   Needing to urinate often   Intense thirst   Blurry vision  Can diabetes lead to other health problems? -- Yes. Type 2 diabetes might not make you feel sick. But if it is not managed, it can lead to serious problems over time, such as:   Heart attacks   Strokes   Kidney disease   Vision problems (or even blindness)   Pain or loss of feeling in the hands and feet   Needing to have fingers, toes, or other body parts removed (amputated)  How do I know if I have type 2 diabetes? -- Your doctor or nurse can do a blood test. There are 2 tests that can be used for this. Both involve measuring the amount of sugar in your blood, called your \"blood sugar\" or \"blood glucose\":   One of the tests measures your blood sugar at the time the blood sample is taken. This test is done in the " "morning. You can't eat or drink anything except water for at least 8 hours before the test.   The other test shows what your average blood sugar has been for the past 2 to 3 months. This blood test is called \"hemoglobin A1C\" or just \"A1C.\" It can be checked at any time of the day, even if you have recently eaten.  How is type 2 diabetes treated? -- The goals of treatment are to manage your blood sugar and lower the risk of future problems that can happen in people with diabetes.  Treatment might include:   Lifestyle changes - This is an important part of managing diabetes. It includes eating healthy foods and getting plenty of physical activity.   Medicines - There are a few medicines that help lower blood sugar. Some people need to take pills that help the body make more insulin or that help insulin do its job. Others need insulin shots.  Depending on what medicines you take, you might need to check your blood sugar regularly at home. But not everyone with type 2 diabetes needs to do this. Your doctor or nurse will tell you if you should be checking your blood sugar, and when and how to do this.  Sometimes, people with type 2 diabetes also need medicines to help prevent problems caused by the disease. For instance, medicines used to lower blood pressure can reduce the chances of a heart attack or stroke.   General medical care - It's also important to take care of other areas of your health. This includes watching your blood pressure and cholesterol levels. You should also get certain vaccines, such as vaccines to protect against the flu and coronavirus disease 2019 (\"COVID-19\"). Some people also need a vaccine to prevent pneumonia.  Can type 2 diabetes be prevented? -- Yes. To lower your chances of getting type 2 diabetes, the most important thing you can do is eat a healthy diet and get plenty of physical activity. This can help you lose weight if you are overweight. But eating well and being active are also good " for your overall health. Even gentle activity, like walking, has benefits.  If you smoke, quitting can also lower your risk of type 2 diabetes. Quitting smoking can be difficult, but your doctor or nurse can help.  All topics are updated as new evidence becomes available and our peer review process is complete.  This topic retrieved from [employer] on: Apr 24, 2024.  Topic 66523 Version 23.0  Release: 32.3.2 - C32.113  © 2024 UpToDate, Inc. and/or its affiliates. All rights reserved.  Consumer Information Use and Disclaimer   Disclaimer: This generalized information is a limited summary of diagnosis, treatment, and/or medication information. It is not meant to be comprehensive and should be used as a tool to help the user understand and/or assess potential diagnostic and treatment options. It does NOT include all information about conditions, treatments, medications, side effects, or risks that may apply to a specific patient. It is not intended to be medical advice or a substitute for the medical advice, diagnosis, or treatment of a health care provider based on the health care provider's examination and assessment of a patient's specific and unique circumstances. Patients must speak with a health care provider for complete information about their health, medical questions, and treatment options, including any risks or benefits regarding use of medications. This information does not endorse any treatments or medications as safe, effective, or approved for treating a specific patient. UpToDate, Inc. and its affiliates disclaim any warranty or liability relating to this information or the use thereof.The use of this information is governed by the Terms of Use, available at https://www.wolterskluwer.com/en/know/clinical-effectiveness-terms. 2024© UpToDate, Inc. and its affiliates and/or licensors. All rights reserved.  Copyright   © 2024 UpToDate, Inc. and/or its affiliates. All rights reserved.

## 2024-08-15 NOTE — ASSESSMENT & PLAN NOTE
-Well controlled   -Continue current regimen   -Appreciate Endocrinology follow-up     Lab Results   Component Value Date    HGBA1C 6.2 08/15/2024

## 2024-08-26 DIAGNOSIS — E11.65 TYPE 2 DIABETES MELLITUS WITH HYPERGLYCEMIA, WITH LONG-TERM CURRENT USE OF INSULIN (HCC): ICD-10-CM

## 2024-08-26 DIAGNOSIS — Z79.4 TYPE 2 DIABETES MELLITUS WITH HYPERGLYCEMIA, WITH LONG-TERM CURRENT USE OF INSULIN (HCC): ICD-10-CM

## 2024-09-05 ENCOUNTER — VBI (OUTPATIENT)
Dept: ADMINISTRATIVE | Facility: OTHER | Age: 39
End: 2024-09-05

## 2024-09-05 NOTE — TELEPHONE ENCOUNTER
09/05/24 3:53 PM     Chart reviewed for DM BP; nothing is submitted to the patient's insurance at this time.     Deidra Winn MA   [facility]

## 2024-09-08 DIAGNOSIS — E11.65 TYPE 2 DIABETES MELLITUS WITH HYPERGLYCEMIA, WITH LONG-TERM CURRENT USE OF INSULIN (HCC): ICD-10-CM

## 2024-09-08 DIAGNOSIS — Z79.4 TYPE 2 DIABETES MELLITUS WITH HYPERGLYCEMIA, WITH LONG-TERM CURRENT USE OF INSULIN (HCC): ICD-10-CM

## 2024-09-09 RX ORDER — INSULIN GLARGINE 300 U/ML
20 INJECTION, SOLUTION SUBCUTANEOUS
Qty: 3 ML | Refills: 3 | Status: SHIPPED | OUTPATIENT
Start: 2024-09-09

## 2024-09-26 DIAGNOSIS — E11.65 TYPE 2 DIABETES MELLITUS WITH HYPERGLYCEMIA, WITHOUT LONG-TERM CURRENT USE OF INSULIN (HCC): ICD-10-CM

## 2024-09-26 DIAGNOSIS — E11.65 TYPE 2 DIABETES MELLITUS WITH HYPERGLYCEMIA, WITH LONG-TERM CURRENT USE OF INSULIN (HCC): ICD-10-CM

## 2024-09-26 DIAGNOSIS — Z79.4 TYPE 2 DIABETES MELLITUS WITH HYPERGLYCEMIA, WITH LONG-TERM CURRENT USE OF INSULIN (HCC): ICD-10-CM

## 2024-09-26 RX ORDER — PEN NEEDLE, DIABETIC 32GX 5/32"
NEEDLE, DISPOSABLE MISCELLANEOUS
Qty: 100 EACH | Refills: 1 | Status: SHIPPED | OUTPATIENT
Start: 2024-09-26

## 2024-10-01 ENCOUNTER — OFFICE VISIT (OUTPATIENT)
Dept: PODIATRY | Facility: CLINIC | Age: 39
End: 2024-10-01
Payer: MEDICARE

## 2024-10-01 VITALS
WEIGHT: 315 LBS | SYSTOLIC BLOOD PRESSURE: 139 MMHG | HEART RATE: 93 BPM | BODY MASS INDEX: 39.17 KG/M2 | HEIGHT: 75 IN | DIASTOLIC BLOOD PRESSURE: 89 MMHG

## 2024-10-01 DIAGNOSIS — E11.65 TYPE 2 DIABETES MELLITUS WITH HYPERGLYCEMIA, UNSPECIFIED WHETHER LONG TERM INSULIN USE (HCC): Primary | ICD-10-CM

## 2024-10-01 DIAGNOSIS — L60.0 INGROWING NAIL: ICD-10-CM

## 2024-10-01 DIAGNOSIS — M77.42 METATARSALGIA OF BOTH FEET: ICD-10-CM

## 2024-10-01 DIAGNOSIS — L85.1 ACQUIRED KERATODERMA: ICD-10-CM

## 2024-10-01 DIAGNOSIS — M77.41 METATARSALGIA OF BOTH FEET: ICD-10-CM

## 2024-10-01 PROCEDURE — 99213 OFFICE O/P EST LOW 20 MIN: CPT | Performed by: PODIATRIST

## 2024-10-01 NOTE — PROGRESS NOTES
PATIENT:  Danial Dior    1985    ASSESSMENT:     1. Type 2 diabetes mellitus with hyperglycemia, unspecified whether long term insulin use (HCC)        2. Acquired keratoderma        3. Metatarsalgia of both feet  XR foot 3+ vw right    XR foot 3+ vw left      4. Ingrowing nail                PLAN:  1.  Reviewed medical records.  Patient was counseled on the condition and diagnosis.    2.  Lesions were trimmed. Instructed skin care.  Instructed home exercise to address ankle equinus.    3. He has chronic ingrown nail and schedule for permanent nail procedure.  4. He has abnormal sensation on the ball of both feet without pain.  Will check X-ray.          Imaging: I have personally reviewed pertinent films in PACS  Labs, pathology, and Other Studies: I have personally reviewed pertinent reports.      Subjective:          HPI  The patient presents for pain associated with calluses in his feet.  He also has chronic ingrown nail in great toes and seeks permanent nail procedure.  He also has some abnormal sensation on the ball of both feet without pain.  No swelling or injury. BS is under control.      The following portions of the patient's history were reviewed and updated as appropriate: allergies, current medications, past family history, past medical history, past social history, past surgical history and problem list.  All pertinent labs and images were reviewed.    Past Medical History  Past Medical History:   Diagnosis Date    Acute pancreatitis 3/26/2023    Diabetes mellitus (HCC) 3/26/23    Hypertension 3/26/23    Obesity        Past Surgical History  Past Surgical History:   Procedure Laterality Date    KNEE SURGERY      MOUTH SURGERY      tongue    NO PAST SURGERIES      PA ARTHRS KNE SURG W/MENISCECTOMY MED/LAT W/SHVG Left 09/08/2020    Procedure: KNEE ARTHROSCOPY PARTIAL LATERAL MENISCECTOMY;  Surgeon: Lasha Magaña DO;  Location: [facility];  Service: Orthopedics        Allergies:  Lantus  [insulin glargine]    Medications:  Current Outpatient Medications   Medication Sig Dispense Refill    amLODIPine (NORVASC) 5 mg tablet Take 1 tablet (5 mg total) by mouth daily for 200 doses 100 tablet 1    BD Pen Needle Princess 2nd Gen 32G X 4 MM MISC USE TO INJECT ONCE DAILY 100 each 1    Blood Glucose Monitoring Suppl (OneTouch Verio) w/Device KIT Use 2 (two) times a day 1 kit 0    dulaglutide (Trulicity) 1.5 MG/0.5ML injection Inject 0.5 mL (1.5 mg total) under the skin every 7 days 6 mL 0    Empagliflozin (Jardiance) 25 MG TABS Take 1 tablet (25 mg total) by mouth every morning 30 tablet 5    insulin glargine (Toujeo SoloStar) 300 units/mL CONCENTRATED U-300 injection pen (1-unit dial) Inject 20 Units under the skin daily at bedtime 3 mL 3    Lancets (OneTouch Delica Plus Atenea32C) MISC USE 2 (TWO) TIMES A  each 1    metFORMIN (GLUCOPHAGE-XR) 500 mg 24 hr tablet Take 2 tablets twice daily with meals 360 tablet 1    omeprazole (PriLOSEC) 20 mg delayed release capsule Take 1 capsule (20 mg total) by mouth daily 180 capsule 1    OneTouch Verio test strip USE 1 EACH 2 (TWO) TIMES A DAY USE AS INSTRUCTED 200 strip 1    mometasone (ELOCON) 0.1 % cream Apply topically daily for 7 days 120 g 0     No current facility-administered medications for this visit.       Social History:  Social History     Socioeconomic History    Marital status: Single     Spouse name: None    Number of children: None    Years of education: None    Highest education level: None   Occupational History    None   Tobacco Use    Smoking status: Former     Current packs/day: 0.00     Average packs/day: 0.3 packs/day for 21.4 years (5.4 ttl pk-yrs)     Types: Cigarettes     Start date: 2002     Quit date: 2024     Years since quittin.4     Passive exposure: Past    Smokeless tobacco: Never   Vaping Use    Vaping status: Never Used   Substance and Sexual Activity    Alcohol use: Yes     Alcohol/week: 10.0 standard drinks of  "alcohol     Types: 10 Cans of beer per week     Comment: 1 time per month 10 beers    Drug use: Never    Sexual activity: Yes     Partners: Female     Birth control/protection: Female Sterilization   Other Topics Concern    None   Social History Narrative    None     Social Determinants of Health     Financial Resource Strain: Not on file   Food Insecurity: Not on file   Transportation Needs: Not on file   Physical Activity: Not on file   Stress: Not on file   Social Connections: Not on file   Intimate Partner Violence: Not on file   Housing Stability: Not on file        Review of Systems   Constitutional:  Negative for chills and fever.   Respiratory:  Negative for cough and shortness of breath.    Cardiovascular:  Negative for chest pain and leg swelling.   Gastrointestinal:  Negative for nausea and vomiting.   Musculoskeletal:  Negative for gait problem.   Skin:  Negative for wound.   Neurological:  Negative for weakness and numbness.   Hematological: Negative.    Psychiatric/Behavioral:  Negative for confusion.          Objective:      /89   Pulse 93   Ht 6' 3\" (1.905 m)   Wt (!) 152 kg (335 lb 3.2 oz)   BMI 41.90 kg/m²          Physical Exam  Vitals reviewed.   Constitutional:       General: He is not in acute distress.     Appearance: He is obese. He is not toxic-appearing.   Cardiovascular:      Rate and Rhythm: Normal rate and regular rhythm.      Pulses: Normal pulses.           Dorsalis pedis pulses are 2+ on the right side and 2+ on the left side.        Posterior tibial pulses are 2+ on the right side and 2+ on the left side.   Pulmonary:      Effort: Pulmonary effort is normal. No respiratory distress.   Musculoskeletal:         General: Deformity present. No swelling, tenderness or signs of injury.      Right lower leg: No edema.      Left lower leg: No edema.      Right foot: No Charcot foot or foot drop.      Left foot: No Charcot foot or foot drop.      Comments: Mild hammertoe noted.  " Tight achilles / calf with ankle equinus.  Tailor's bunion bilaterally with prominent 5th met head.  No pain on submets and interspace.     Feet:      Right foot:      Skin integrity: Callus present. No ulcer, skin breakdown or erythema.      Left foot:      Skin integrity: Callus present. No ulcer, skin breakdown or erythema.   Skin:     General: Skin is warm.      Capillary Refill: Capillary refill takes less than 2 seconds.      Coloration: Skin is not cyanotic or mottled.      Findings: No abscess, ecchymosis, erythema, rash or wound.      Nails: There is no clubbing.      Comments: Hyperkeratotic skin lesion on bilateral 5th met head plantarlaterally.  Incurvated medial nail border bilateral great toes.     Neurological:      General: No focal deficit present.      Mental Status: He is alert and oriented to person, place, and time.      Cranial Nerves: No cranial nerve deficit.      Sensory: No sensory deficit.      Motor: No weakness.      Coordination: Coordination normal.      Gait: Gait normal.      Deep Tendon Reflexes: Reflexes normal.   Psychiatric:         Mood and Affect: Mood normal.         Behavior: Behavior normal.         Thought Content: Thought content normal.         Judgment: Judgment normal.

## 2024-11-14 DIAGNOSIS — Z79.4 TYPE 2 DIABETES MELLITUS WITH HYPERGLYCEMIA, WITH LONG-TERM CURRENT USE OF INSULIN (HCC): ICD-10-CM

## 2024-11-14 DIAGNOSIS — E11.65 TYPE 2 DIABETES MELLITUS WITH HYPERGLYCEMIA, WITH LONG-TERM CURRENT USE OF INSULIN (HCC): ICD-10-CM

## 2024-11-14 RX ORDER — EMPAGLIFLOZIN 25 MG/1
25 TABLET, FILM COATED ORAL EVERY MORNING
Qty: 30 TABLET | Refills: 5 | Status: SHIPPED | OUTPATIENT
Start: 2024-11-14

## 2024-12-03 ENCOUNTER — OFFICE VISIT (OUTPATIENT)
Dept: PODIATRY | Facility: CLINIC | Age: 39
End: 2024-12-03
Payer: MEDICARE

## 2024-12-03 VITALS
DIASTOLIC BLOOD PRESSURE: 87 MMHG | SYSTOLIC BLOOD PRESSURE: 128 MMHG | HEART RATE: 108 BPM | BODY MASS INDEX: 39.17 KG/M2 | WEIGHT: 315 LBS | HEIGHT: 75 IN

## 2024-12-03 DIAGNOSIS — M79.674 PAIN OF RIGHT GREAT TOE: ICD-10-CM

## 2024-12-03 DIAGNOSIS — M79.675 PAIN OF LEFT GREAT TOE: ICD-10-CM

## 2024-12-03 DIAGNOSIS — L60.0 INGROWING NAIL: Primary | ICD-10-CM

## 2024-12-03 PROCEDURE — 11750 EXCISION NAIL&NAIL MATRIX: CPT | Performed by: PODIATRIST

## 2024-12-03 NOTE — PROGRESS NOTES
"Discussed options with the patient.  The patient wished to proceed with P&A on both great toes..  See procedure.      Home care instructions were given to the patient including decreased activity, ice and home pain medication as needed for 3 days. Patient will also perform daily dressing changes until the surgical site is fully healed.      Nail removal    Date/Time: 12/3/2024 10:00 AM    Performed by: Jose Eduardo Garsia DPM  Authorized by: Jose Eduardo Garsia DPM    Patient location:  [facility] Protocol:  Consent: Verbal consent obtained.  Risks and benefits: risks, benefits and alternatives were discussed  Consent given by: patient  Time out: Immediately prior to procedure a \"time out\" was called to verify the correct patient, procedure, equipment, support staff and site/side marked as required.  Timeout called at: 12/3/2024 11:28 AM.  Patient understanding: patient states understanding of the procedure being performed  Site marked: the operative site was marked  Patient identity confirmed: verbally with patient    Location:     Foot:  R big toe  Pre-procedure details:     Skin preparation:  Betadine    Preparation: Patient was prepped and draped in the usual sterile fashion    Anesthesia (see MAR for exact dosages):     Anesthesia method:  Local infiltration    Local anesthetic:  Lidocaine 1% w/o epi  Nail Removal:     Nail removed:  Partial    Nail side:  Medial  Ingrown nail:     Nail matrix removed or ablated:  Partial (P&A 30 X 4)  Post-procedure details:     Dressing:  4x4 sterile gauze, antibiotic ointment and gauze roll    Patient tolerance of procedure:  Tolerated well, no immediate complications  Nail removal    Date/Time: 12/3/2024 10:00 AM    Performed by: Jose Eduardo Garsia DPM  Authorized by: Jose Eduardo Garsia DPM    Patient location:  [facility] Protocol:  Consent: Verbal consent obtained.  Risks and benefits: risks, benefits and alternatives were discussed  Consent given by: patient  Time out: Immediately prior to " "procedure a \"time out\" was called to verify the correct patient, procedure, equipment, support staff and site/side marked as required.  Timeout called at: 12/3/2024 11:28 AM.  Patient understanding: patient states understanding of the procedure being performed  Site marked: the operative site was marked  Patient identity confirmed: verbally with patient    Location:     Foot:  L big toe  Pre-procedure details:     Skin preparation:  Betadine    Preparation: Patient was prepped and draped in the usual sterile fashion    Anesthesia (see MAR for exact dosages):     Anesthesia method:  Local infiltration    Local anesthetic:  Lidocaine 1% w/o epi  Nail Removal:     Nail removed:  Partial    Nail side:  Medial  Ingrown nail:     Nail matrix removed or ablated:  Partial (P&A 30 X 4)  Post-procedure details:     Dressing:  4x4 sterile gauze, antibiotic ointment and gauze roll    Patient tolerance of procedure:  Tolerated well, no immediate complications      "

## 2024-12-07 DIAGNOSIS — E11.65 TYPE 2 DIABETES MELLITUS WITH HYPERGLYCEMIA, WITHOUT LONG-TERM CURRENT USE OF INSULIN (HCC): ICD-10-CM

## 2024-12-09 RX ORDER — PEN NEEDLE, DIABETIC 32GX 5/32"
NEEDLE, DISPOSABLE MISCELLANEOUS AS NEEDED
Qty: 100 EACH | Refills: 3 | Status: SHIPPED | OUTPATIENT
Start: 2024-12-09

## 2024-12-16 DIAGNOSIS — K21.9 GASTROESOPHAGEAL REFLUX DISEASE, UNSPECIFIED WHETHER ESOPHAGITIS PRESENT: ICD-10-CM

## 2024-12-24 ENCOUNTER — OFFICE VISIT (OUTPATIENT)
Dept: PODIATRY | Facility: CLINIC | Age: 39
End: 2024-12-24
Payer: MEDICARE

## 2024-12-24 VITALS — HEIGHT: 75 IN | BODY MASS INDEX: 39.17 KG/M2 | WEIGHT: 315 LBS

## 2024-12-24 DIAGNOSIS — L60.0 INGROWING NAIL: Primary | ICD-10-CM

## 2024-12-24 DIAGNOSIS — L85.1 ACQUIRED KERATODERMA: ICD-10-CM

## 2024-12-24 PROCEDURE — 99212 OFFICE O/P EST SF 10 MIN: CPT | Performed by: PODIATRIST

## 2024-12-24 NOTE — PROGRESS NOTES
"        PATIENT:  Danial Dior      1985    ASSESSMENT     1. Ingrowing nail        2. Acquired keratoderma               PLAN  Patient is doing well.  Continue local care until wound is fully healed.  Instructed proper nail care and prevention of ingrown nail.  HPK trimmed.  Instructed proper skin care and footwear.  Pt to return for DM foot exam as scheduled.    HISTORY OF PRESENT ILLNESS  Patient presents for follow-up.  Pain is minimal.  Wound looks healing well.      REVIEW OF SYSTEMS  GENERAL: No fever or chills.    HEART: No chest pain, or palpitation  RESPIRATORY:  No SOB or cough  GI: No Nausea, vomit or diarrhea    PHYSICAL EXAMINATION    Ht 6' 3\" (1.905 m)   Wt (!) 152 kg (335 lb)   BMI 41.87 kg/m²     GENERAL  The patient appears in NAD / non-toxic. Afebrile. VSS    VASCULAR EXAM  Pedal pulses and vascular status are intact.  No cyanosis.    DERMATOLOGIC EXAM  Wounds almost healed bilateral great toe.  No signs of infection.  HPK bilateral submet 5.      NEUROLOGIC EXAM  AAO X 3.  No focal neurologic deficit.  Neurologic status is intact BLE.    MUSCULOSKELETAL EXAM  ROM intact.  No fluctuation or crepitus.  No injury.  "

## 2025-01-02 DIAGNOSIS — Z79.4 TYPE 2 DIABETES MELLITUS WITH HYPERGLYCEMIA, WITH LONG-TERM CURRENT USE OF INSULIN (HCC): ICD-10-CM

## 2025-01-02 DIAGNOSIS — E11.65 TYPE 2 DIABETES MELLITUS WITH HYPERGLYCEMIA, WITH LONG-TERM CURRENT USE OF INSULIN (HCC): ICD-10-CM

## 2025-01-03 ENCOUNTER — TELEPHONE (OUTPATIENT)
Age: 40
End: 2025-01-03

## 2025-01-03 NOTE — TELEPHONE ENCOUNTER
PA for Trulicity 1.5mg SUBMITTED to [employer]    via    [x]CMM-KEY: [id number]  []Surescripts-Case ID #   []Availity-Auth ID # NDC #   []Faxed to plan   []Other website   []Phone call Case ID #     [x]PA sent as URGENT    All office notes, labs and other pertaining documents and studies sent. Clinical questions answered. Awaiting determination from insurance company.     Turnaround time for your insurance to make a decision on your Prior Authorization can take 7-21 business days.

## 2025-01-13 DIAGNOSIS — Z79.4 TYPE 2 DIABETES MELLITUS WITH HYPERGLYCEMIA, WITH LONG-TERM CURRENT USE OF INSULIN (HCC): ICD-10-CM

## 2025-01-13 DIAGNOSIS — E11.65 TYPE 2 DIABETES MELLITUS WITH HYPERGLYCEMIA, WITH LONG-TERM CURRENT USE OF INSULIN (HCC): ICD-10-CM

## 2025-01-15 DIAGNOSIS — I10 PRIMARY HYPERTENSION: ICD-10-CM

## 2025-01-15 RX ORDER — AMLODIPINE BESYLATE 5 MG/1
5 TABLET ORAL DAILY
Qty: 100 TABLET | Refills: 1 | Status: SHIPPED | OUTPATIENT
Start: 2025-01-15 | End: 2025-08-03

## 2025-01-31 ENCOUNTER — APPOINTMENT (OUTPATIENT)
Dept: LAB | Facility: AMBULARY SURGERY CENTER | Age: 40
End: 2025-01-31
Payer: MEDICARE

## 2025-01-31 ENCOUNTER — OFFICE VISIT (OUTPATIENT)
Dept: ENDOCRINOLOGY | Facility: CLINIC | Age: 40
End: 2025-01-31
Payer: MEDICARE

## 2025-01-31 VITALS
SYSTOLIC BLOOD PRESSURE: 118 MMHG | OXYGEN SATURATION: 98 % | HEIGHT: 75 IN | BODY MASS INDEX: 39.17 KG/M2 | HEART RATE: 100 BPM | WEIGHT: 315 LBS | DIASTOLIC BLOOD PRESSURE: 80 MMHG

## 2025-01-31 DIAGNOSIS — E11.65 TYPE 2 DIABETES MELLITUS WITH HYPERGLYCEMIA, WITH LONG-TERM CURRENT USE OF INSULIN (HCC): ICD-10-CM

## 2025-01-31 DIAGNOSIS — Z79.4 TYPE 2 DIABETES MELLITUS WITH HYPERGLYCEMIA, WITH LONG-TERM CURRENT USE OF INSULIN (HCC): ICD-10-CM

## 2025-01-31 DIAGNOSIS — Z00.00 ANNUAL PHYSICAL EXAM: ICD-10-CM

## 2025-01-31 DIAGNOSIS — E11.65 TYPE 2 DIABETES MELLITUS WITH HYPERGLYCEMIA, UNSPECIFIED WHETHER LONG TERM INSULIN USE (HCC): Primary | ICD-10-CM

## 2025-01-31 DIAGNOSIS — E78.2 MIXED HYPERLIPIDEMIA: ICD-10-CM

## 2025-01-31 DIAGNOSIS — R10.13 EPIGASTRIC PAIN: ICD-10-CM

## 2025-01-31 DIAGNOSIS — I10 ESSENTIAL HYPERTENSION: ICD-10-CM

## 2025-01-31 LAB
ALBUMIN SERPL BCG-MCNC: 4.3 G/DL (ref 3.5–5)
ALP SERPL-CCNC: 57 U/L (ref 34–104)
ALT SERPL W P-5'-P-CCNC: 36 U/L (ref 7–52)
ANION GAP SERPL CALCULATED.3IONS-SCNC: 9 MMOL/L (ref 4–13)
AST SERPL W P-5'-P-CCNC: 20 U/L (ref 13–39)
BILIRUB SERPL-MCNC: 0.35 MG/DL (ref 0.2–1)
BUN SERPL-MCNC: 16 MG/DL (ref 5–25)
CALCIUM SERPL-MCNC: 9 MG/DL (ref 8.4–10.2)
CHLORIDE SERPL-SCNC: 102 MMOL/L (ref 96–108)
CO2 SERPL-SCNC: 25 MMOL/L (ref 21–32)
CREAT SERPL-MCNC: 0.77 MG/DL (ref 0.6–1.3)
GFR SERPL CREATININE-BSD FRML MDRD: 114 ML/MIN/1.73SQ M
GLUCOSE P FAST SERPL-MCNC: 181 MG/DL (ref 65–99)
LIPASE SERPL-CCNC: 10 U/L (ref 11–82)
POTASSIUM SERPL-SCNC: 4 MMOL/L (ref 3.5–5.3)
PROT SERPL-MCNC: 7.1 G/DL (ref 6.4–8.4)
SL AMB POCT HEMOGLOBIN AIC: 6.7 (ref ?–6.5)
SODIUM SERPL-SCNC: 136 MMOL/L (ref 135–147)

## 2025-01-31 PROCEDURE — 99214 OFFICE O/P EST MOD 30 MIN: CPT | Performed by: INTERNAL MEDICINE

## 2025-01-31 PROCEDURE — 80053 COMPREHEN METABOLIC PANEL: CPT

## 2025-01-31 PROCEDURE — 36415 COLL VENOUS BLD VENIPUNCTURE: CPT

## 2025-01-31 PROCEDURE — 83036 HEMOGLOBIN GLYCOSYLATED A1C: CPT | Performed by: INTERNAL MEDICINE

## 2025-01-31 PROCEDURE — 83690 ASSAY OF LIPASE: CPT

## 2025-01-31 RX ORDER — INSULIN GLARGINE 300 U/ML
18 INJECTION, SOLUTION SUBCUTANEOUS
Start: 2025-01-31

## 2025-01-31 RX ORDER — DULAGLUTIDE 3 MG/.5ML
INJECTION, SOLUTION SUBCUTANEOUS
Qty: 6 ML | Refills: 1 | Status: SHIPPED | OUTPATIENT
Start: 2025-01-31

## 2025-01-31 NOTE — PATIENT INSTRUCTIONS
Take omega-3 fatty acid, 1000 mg daily.  Please get the blood work done as ordered now and again in 6 months.  Follow-up for your appointment in 6 months.

## 2025-01-31 NOTE — PROGRESS NOTES
Danial Dior 39 y.o. male MRN: [id number]    Encounter: [id number]      Assessment & Plan     Assessment:  This is a 39 y.o.-year-old male with diabetes with hyperglycemia.    Plan:  Diagnoses and all orders for this visit:    Type 2 diabetes mellitus with hyperglycemia, unspecified whether long term insulin use (HCC)  Lab Results   Component Value Date    HGBA1C 6.7 (A) 01/31/2025   His A1c is 6.7%, close to the goal.  Goal for A1c is 6.5%.  Will increase Trulicity to 3.0 mg once a week.  Reduce Lantus to 18 units at bedtime.  Continue Jardiance 25 mg daily.  Continue metformin 1000 mg twice a day.  Reinforced lifestyle modifications, importance of weight loss.  Educated about hypoglycemia symptoms and treatment  Follow-up in 6 months    -     POCT hemoglobin A1c  -     Ambulatory referral to Podiatry; Future  -     Ambulatory Referral to Ophthalmology; Future  -     Dulaglutide (Trulicity) 3 MG/0.5ML SOAJ; Inject 3 mg once a week  -     Basic metabolic panel; Future  -     Comprehensive metabolic panel; Future  -     Albumin / creatinine urine ratio; Future  -     Hemoglobin A1C; Future    Essential hypertension  Blood pressure well-controlled, continue current management as per PCP  Mixed hyperlipidemia  Educated to start omega-3 fatty acid 1000 mg daily for hypertriglyceridemia  Also he would benefit from statins, will defer to PCP  Type 2 diabetes mellitus with hyperglycemia, with long-term current use of insulin (HCC)  -     insulin glargine (Toujeo SoloStar) 300 units/mL CONCENTRATED U-300 injection pen (1-unit dial); Inject 18 Units under the skin daily at bedtime         CC: Diabetes    History of Present Illness     HPI:    Danial Dior is 39-year-old male with medical history of type 2 diabetes, obesity, hypertension and hyperlipidemia is here for follow-up    Diabetes course has been stable.  He follows with podiatry and ophthalmology regularly  Eye exam is done in PCP office on yearly  basis    Current regimen includes Trulicity 1.5 mg once a week, Jardiance 25 mg daily, glargine 20 units daily    He checks blood sugars twice daily and his blood sugars usually range in 100 to 180 mg per DL range.  He denies hypoglycemic episodes        Lab Results   Component Value Date    HGBA1C 6.7 (A) 01/31/2025     Wt Readings from Last 3 Encounters:   01/31/25 (!) 156 kg (345 lb)   12/24/24 (!) 152 kg (335 lb)   12/03/24 (!) 152 kg (335 lb)           Review of Systems   Constitutional:  Negative for activity change, diaphoresis, fatigue, fever and unexpected weight change.   HENT: Negative.     Eyes:  Negative for visual disturbance.   Respiratory:  Negative for cough, chest tightness and shortness of breath.    Cardiovascular:  Negative for chest pain, palpitations and leg swelling.   Gastrointestinal:  Negative for abdominal pain, blood in stool, constipation, diarrhea, nausea and vomiting.   Endocrine: Negative for cold intolerance, heat intolerance, polydipsia, polyphagia and polyuria.   Genitourinary:  Negative for dysuria, enuresis, frequency and urgency.   Musculoskeletal:  Negative for arthralgias and myalgias.   Skin:  Negative for pallor, rash and wound.   Allergic/Immunologic: Negative.    Neurological:  Negative for dizziness, tremors, weakness and numbness.   Hematological: Negative.    Psychiatric/Behavioral: Negative.         Historical Information   Past Medical History:   Diagnosis Date    Acute pancreatitis 3/26/2023    Diabetes mellitus (HCC) 3/26/23    Hypertension 3/26/23    Obesity      Past Surgical History:   Procedure Laterality Date    KNEE SURGERY      MOUTH SURGERY      tongue    NO PAST SURGERIES      PA ARTHRS KNE SURG W/MENISCECTOMY MED/LAT W/SHVG Left 09/08/2020    Procedure: KNEE ARTHROSCOPY PARTIAL LATERAL MENISCECTOMY;  Surgeon: Lasha Magaña DO;  Location: [facility];  Service: Orthopedics     Social History   Social History     Substance and Sexual Activity   Alcohol Use  Yes    Alcohol/week: 10.0 standard drinks of alcohol    Types: 10 Cans of beer per week    Comment: 1 time per month 10 beers     Social History     Substance and Sexual Activity   Drug Use Never     Social History     Tobacco Use   Smoking Status Former    Current packs/day: 0.00    Average packs/day: 0.3 packs/day for 21.4 years (5.4 ttl pk-yrs)    Types: Cigarettes    Start date: 2002    Quit date: 2024    Years since quittin.7    Passive exposure: Past   Smokeless Tobacco Never     Family History:   Family History   Problem Relation Age of Onset    Hypertension Mother     Hypertension Father     Diabetes Father     Diabetes type II Father        Meds/Allergies   Current Outpatient Medications   Medication Sig Dispense Refill    amLODIPine (NORVASC) 5 mg tablet Take 1 tablet (5 mg total) by mouth daily for 200 doses 100 tablet 1    Blood Glucose Monitoring Suppl (OneTouch Verio) w/Device KIT Use 2 (two) times a day 1 kit 0    Dulaglutide (Trulicity) 3 MG/0.5ML SOAJ Inject 3 mg once a week 6 mL 1    Empagliflozin (Jardiance) 25 MG TABS Take 1 tablet (25 mg total) by mouth every morning 30 tablet 5    insulin glargine (Toujeo SoloStar) 300 units/mL CONCENTRATED U-300 injection pen (1-unit dial) Inject 18 Units under the skin daily at bedtime      Insulin Pen Needle (BD Pen Needle Princess 2nd Gen) 32G X 4 MM MISC Use if needed (test blood sugar) 100 each 3    Lancets (OneTouch Delica Plus [id number]) MISC USE 2 (TWO) TIMES A  each 1    metFORMIN (GLUCOPHAGE-XR) 500 mg 24 hr tablet Take 2 tablets twice daily with meals 360 tablet 1    omeprazole (PriLOSEC) 20 mg delayed release capsule Take 1 capsule (20 mg total) by mouth daily 180 capsule 1    OneTouch Verio test strip USE 1 EACH 2 (TWO) TIMES A DAY USE AS INSTRUCTED 200 strip 1    mometasone (ELOCON) 0.1 % cream Apply topically daily for 7 days 120 g 0     No current facility-administered medications for this visit.     Allergies   Allergen  "Reactions    Lantus [Insulin Glargine] Rash       Objective   Vitals: Blood pressure 118/80, pulse 100, height 6' 3\" (1.905 m), weight (!) 156 kg (345 lb), SpO2 98%.    Physical Exam  Vitals reviewed.   Constitutional:       General: He is not in acute distress.     Appearance: Normal appearance. He is not ill-appearing.   HENT:      Head: Normocephalic and atraumatic.      Nose: Nose normal.   Eyes:      Extraocular Movements: Extraocular movements intact.      Conjunctiva/sclera: Conjunctivae normal.   Pulmonary:      Effort: No respiratory distress.   Musculoskeletal:      Cervical back: Normal range of motion.   Neurological:      General: No focal deficit present.      Mental Status: He is alert and oriented to person, place, and time.   Psychiatric:         Mood and Affect: Mood normal.         Behavior: Behavior normal.         The history was obtained from the review of the chart, patient.    Lab Results:   Lab Results   Component Value Date/Time    Hemoglobin A1C 6.7 (A) 01/31/2025 08:29 AM    Hemoglobin A1C 6.2 08/15/2024 09:04 AM    Hemoglobin A1C 9.7 (A) 05/13/2024 10:12 AM    BUN 18 08/14/2024 09:02 AM    Potassium 3.8 08/14/2024 09:02 AM    Chloride 105 08/14/2024 09:02 AM    CO2 24 08/14/2024 09:02 AM    Creatinine 0.77 08/14/2024 09:02 AM    HDL, Direct 36 (L) 08/14/2024 09:02 AM    Triglycerides 260 (H) 08/14/2024 09:02 AM           Imaging Studies: Results Review Statement: No pertinent imaging studies reviewed.    Portions of the record may have been created with voice recognition software. Occasional wrong word or \"sound a like\" substitutions may have occurred due to the inherent limitations of voice recognition software. Read the chart carefully and recognize, using context, where substitutions have occurred.    "

## 2025-02-07 ENCOUNTER — RESULTS FOLLOW-UP (OUTPATIENT)
Dept: INTERNAL MEDICINE CLINIC | Facility: CLINIC | Age: 40
End: 2025-02-07

## 2025-02-17 ENCOUNTER — OFFICE VISIT (OUTPATIENT)
Dept: INTERNAL MEDICINE CLINIC | Facility: CLINIC | Age: 40
End: 2025-02-17
Payer: MEDICARE

## 2025-02-17 VITALS
HEART RATE: 117 BPM | WEIGHT: 315 LBS | BODY MASS INDEX: 43.22 KG/M2 | OXYGEN SATURATION: 98 % | SYSTOLIC BLOOD PRESSURE: 132 MMHG | DIASTOLIC BLOOD PRESSURE: 76 MMHG

## 2025-02-17 DIAGNOSIS — R39.15 URINARY URGENCY: ICD-10-CM

## 2025-02-17 DIAGNOSIS — E78.1 HYPERTRIGLYCERIDEMIA: ICD-10-CM

## 2025-02-17 DIAGNOSIS — I10 ESSENTIAL HYPERTENSION: ICD-10-CM

## 2025-02-17 DIAGNOSIS — Z79.4 TYPE 2 DIABETES MELLITUS WITH HYPERGLYCEMIA, WITH LONG-TERM CURRENT USE OF INSULIN (HCC): Primary | ICD-10-CM

## 2025-02-17 DIAGNOSIS — E11.65 TYPE 2 DIABETES MELLITUS WITH HYPERGLYCEMIA, WITH LONG-TERM CURRENT USE OF INSULIN (HCC): Primary | ICD-10-CM

## 2025-02-17 DIAGNOSIS — R35.0 URINARY FREQUENCY: ICD-10-CM

## 2025-02-17 DIAGNOSIS — K21.9 GASTROESOPHAGEAL REFLUX DISEASE, UNSPECIFIED WHETHER ESOPHAGITIS PRESENT: ICD-10-CM

## 2025-02-17 PROCEDURE — 99214 OFFICE O/P EST MOD 30 MIN: CPT | Performed by: INTERNAL MEDICINE

## 2025-02-17 NOTE — ASSESSMENT & PLAN NOTE
-Well controlled.  -Appreciate endocrinology follow-up.  Continue current insulin regimen.  Lab Results   Component Value Date    HGBA1C 6.7 (A) 01/31/2025     Orders:  •  IRIS Diabetic eye exam  •  TSH, 3rd generation with Free T4 reflex; Future

## 2025-02-17 NOTE — ASSESSMENT & PLAN NOTE
-Possibly due to OAB. May benefit from pelvic floor therapy. A referral has been sent to Urology for further eval. check urinalysis.  Orders:  •  Urinalysis with microscopic; Future  •  Ambulatory Referral to Urology; Future

## 2025-02-17 NOTE — PROGRESS NOTES
Name: Danial Dior      : 1985      MRN: [id number]  Encounter Provider: Conrado Stephen MD  Encounter Date: 2025   Encounter department: [facility]  :  Assessment & Plan  Type 2 diabetes mellitus with hyperglycemia, with long-term current use of insulin (HCC)  -Well controlled.  -Appreciate endocrinology follow-up.  Continue current insulin regimen.  Lab Results   Component Value Date    HGBA1C 6.7 (A) 2025     Orders:  •  IRIS Diabetic eye exam  •  TSH, 3rd generation with Free T4 reflex; Future    Essential hypertension  -Blood pressure well controlled  -Continue current antihypertensive regimen       Gastroesophageal reflux disease, unspecified whether esophagitis present  -Well controlled   -Continue current dose of Omeprazole        Hypertriglyceridemia  -Instructed patient to start fish oil 2000 mg daily       Urinary urgency  -Possibly due to OAB. May benefit from pelvic floor therapy. A referral has been sent to Urology for further eval. check urinalysis.  Orders:  •  Urinalysis with microscopic; Future  •  Ambulatory Referral to Urology; Future    Urinary frequency    Orders:  •  Urinalysis with microscopic; Future  •  Ambulatory Referral to Urology; Future         History of Present Illness   HPI  Patient presents today for chronic follow-up.  His past medical history is notable for type 2 diabetes mellitus, essential hypertension and GERD.  His main complaint today is that he has been experiencing urinary urgency as well as frequency over the past 2 months.  He denies any pain/burning with urination or nocturia.  He states at times when he is laughing or coughing it feels as though he is going to urinate on himself.  He denies any incontinence.      Review of Systems  All other systems negative except for pertinent findings noted in HPI.       Objective   /76 (BP Location: Left arm, Patient Position: Sitting, Cuff Size: Large)   Pulse (!) 117    Wt (!) 157 kg (345 lb 12.8 oz)   SpO2 98%   BMI 43.22 kg/m²      Physical Exam  General: NAD  HEENT: NCAT, EOMI, normal conjunctiva  Cardiovascular: RRR, normal S1 and S2, no m/r/g  Pulmonary: Normal respiratory effort, no wheezes, rales or rhonchi  Extremities: No lower extremity edema  Skin: Normal skin color, no rashes   Psychiatric: Normal mood and affect

## 2025-02-21 ENCOUNTER — TELEPHONE (OUTPATIENT)
Age: 40
End: 2025-02-21

## 2025-02-21 NOTE — TELEPHONE ENCOUNTER
New Patient    Appointment Scheduling  What office location does the patient prefer?: [place]  What is the reason for the patient's appointment?: Urinary urgency, Urinary frequency  Have patient records been requested?:  If No, are the records showing in Epic: Records in epic     Appointment Details  Date:3/12/25  Time:9:40 am      Location:[place]     Provider: Nelly     Does the appointment need further review? (Reason)      HISTORY  Is the patient having active symptoms? If so, describe symptoms: Urinary urgency, Urinary frequency for a couple of months now   Has the patient had any previous Urologist(s)?:  Was the patient seen in the ED?:  Has the patient had any outside testing done?: In chart   Does patient have Imaging/Lab Results:In chart- updated urine testing is ordered   Does the patient have a personal history of any cancer?: No     INSURANCE   Have you confirmed Patient's insurance? Yes to all three   Is the insurance accepted?    Is the insurance active?

## 2025-03-04 DIAGNOSIS — K21.9 GASTROESOPHAGEAL REFLUX DISEASE, UNSPECIFIED WHETHER ESOPHAGITIS PRESENT: ICD-10-CM

## 2025-03-06 RX ORDER — OMEPRAZOLE 20 MG/1
20 CAPSULE, DELAYED RELEASE ORAL DAILY
Qty: 180 CAPSULE | Refills: 1 | Status: SHIPPED | OUTPATIENT
Start: 2025-03-06

## 2025-03-07 ENCOUNTER — APPOINTMENT (OUTPATIENT)
Dept: LAB | Facility: AMBULARY SURGERY CENTER | Age: 40
End: 2025-03-07
Payer: MEDICARE

## 2025-03-07 ENCOUNTER — RESULTS FOLLOW-UP (OUTPATIENT)
Dept: INTERNAL MEDICINE CLINIC | Facility: CLINIC | Age: 40
End: 2025-03-07

## 2025-03-07 DIAGNOSIS — Z79.4 TYPE 2 DIABETES MELLITUS WITH HYPERGLYCEMIA, WITH LONG-TERM CURRENT USE OF INSULIN (HCC): ICD-10-CM

## 2025-03-07 DIAGNOSIS — E11.65 TYPE 2 DIABETES MELLITUS WITH HYPERGLYCEMIA, WITH LONG-TERM CURRENT USE OF INSULIN (HCC): ICD-10-CM

## 2025-03-07 DIAGNOSIS — R35.0 URINARY FREQUENCY: ICD-10-CM

## 2025-03-07 DIAGNOSIS — R39.15 URINARY URGENCY: ICD-10-CM

## 2025-03-07 LAB
BACTERIA UR QL AUTO: ABNORMAL /HPF
BILIRUB UR QL STRIP: NEGATIVE
CLARITY UR: CLEAR
COLOR UR: ABNORMAL
GLUCOSE UR STRIP-MCNC: ABNORMAL MG/DL
HGB UR QL STRIP.AUTO: NEGATIVE
KETONES UR STRIP-MCNC: NEGATIVE MG/DL
LEUKOCYTE ESTERASE UR QL STRIP: ABNORMAL
NITRITE UR QL STRIP: NEGATIVE
NON-SQ EPI CELLS URNS QL MICRO: ABNORMAL /HPF
PH UR STRIP.AUTO: 6 [PH]
PROT UR STRIP-MCNC: NEGATIVE MG/DL
RBC #/AREA URNS AUTO: ABNORMAL /HPF
SP GR UR STRIP.AUTO: 1.03 (ref 1–1.03)
TSH SERPL DL<=0.05 MIU/L-ACNC: 3.41 UIU/ML (ref 0.45–4.5)
UROBILINOGEN UR STRIP-ACNC: <2 MG/DL
WBC #/AREA URNS AUTO: ABNORMAL /HPF

## 2025-03-07 PROCEDURE — 81001 URINALYSIS AUTO W/SCOPE: CPT

## 2025-03-07 PROCEDURE — 84443 ASSAY THYROID STIM HORMONE: CPT

## 2025-03-07 PROCEDURE — 36415 COLL VENOUS BLD VENIPUNCTURE: CPT

## 2025-03-12 ENCOUNTER — CONSULT (OUTPATIENT)
Dept: UROLOGY | Facility: CLINIC | Age: 40
End: 2025-03-12
Payer: MEDICARE

## 2025-03-12 VITALS
OXYGEN SATURATION: 99 % | SYSTOLIC BLOOD PRESSURE: 128 MMHG | WEIGHT: 315 LBS | HEART RATE: 103 BPM | BODY MASS INDEX: 39.17 KG/M2 | DIASTOLIC BLOOD PRESSURE: 82 MMHG | HEIGHT: 75 IN

## 2025-03-12 DIAGNOSIS — R35.0 URINARY FREQUENCY: ICD-10-CM

## 2025-03-12 DIAGNOSIS — R39.15 URINARY URGENCY: ICD-10-CM

## 2025-03-12 LAB — POST-VOID RESIDUAL VOLUME, ML POC: 30 ML

## 2025-03-12 PROCEDURE — 51798 US URINE CAPACITY MEASURE: CPT

## 2025-03-12 PROCEDURE — 99203 OFFICE O/P NEW LOW 30 MIN: CPT

## 2025-03-12 NOTE — PATIENT INSTRUCTIONS
Program for Pelvic Floor Relaxation   Rufino Martinez MD,PhD        The pelvic floor is a group of muscles that sits at the very bottom of your urinary and digestive tracts.  Although these muscles must stay toned to prevent urinary incontinence (or leakage), they must also relax in order for us to urinate comfortably.   If you are receiving this handout, your urology provider believes that your pelvic floor could use some help.           This part of your body is a common source of urinary difficulty including:     Weak urinary stream, needing to push or strain to empty your bladder     Urinary frequency or urgency     Pain or discomfort during or after urination     You have probably been holding these muscles “tight” for a long time.  When this happens, your brain accepts this as normal.  The first step is to become aware of the tense muscles.  Then you can begin your practice to relax these muscles.       Although the other organs in your urinary tract can be addressed with medications or surgery (such as the bladder, or prostate in men), pelvic floor relaxation requires a different plan.  This is all about the mind-body connection!  In addition, the pelvic floor can become excessively tight with stress.       Relaxing your pelvic floor involves 3 steps: (1) training your body to identify these muscles, (2) relaxation exercises, and (3) mindfulness practices for stress reduction.     Practice for 10-30 minutes daily (or twice daily if needed).  The more you practice, the better you will feel.  As you become more aware of the muscles and how tense they are, relaxation will start to become automatic.  Be patient and keep trying until you are able to relax easily and quickly.     Please visit my website for a collection of carefully selected videos and other resources to guide your pelvic floor relaxation practice!       [email or web address]      You can  also access this website using this QR code:          Relaxation Techniques:     Find the pelvic floor muscles. Learn to make them contract and relax. First, look at the diagram of the pelvic floor muscles. The muscles sit like a hammock inside your pelvis. In the front, they start at the pubic bone, surround the vagina or penis and rectum, and attach in the back to your coccyx (tailbone). Picture the muscles in your mind. Imagine the muscles relaxing, leroy, and relaxing. Imagine how they would feel without pain or problems. Picture the muscles sagging down now, with your vagina or rectum (as a [place]) getting larger, and your sitting bones (the bones in your pelvis) .      Learn diaphragmatic breathing. This is a type of “deep breathing” to practice during all your pelvic relaxation exercises. First, take a slow, gentle breath in through your nose, and allow your belly and ribs to flare out to the sides. “Open” your pelvic floor with your inhale breath. Exhale slowly and gently through your mouth, allowing your belly to fall. Let the air out of your upper lungs, relax your ribs, belly and pelvic floor.     Try yoga!  Please visit my website above for an excellent youTube video on specific yoga exercises for pelvic floor relaxation.  Even if you have tried yoga before, this video describes easy/beginner yoga poses that specifically improve pelvic floor relaxation.     Incorporate mindfulness practices such as meditation.  Sit in a quiet room in a comfortable position.  Try a guided meditation on youtube, or the apps Calm or Headspace.  I recommend “body scan meditations” as particularly effective in aiding pelvic floor relaxation.      Relax your pelvic floor prior to urinating.  Once you have learned to target these muscles and relax them, make a habit of relaxing them before you urinate.  Being intentional to relax these muscles will allow you to urinate more comfortably.     Pay attention to  other areas of your body for signs of tension. Tension in other parts of your body may be making the tension in the pelvic floor muscles worse. Check various body parts (head, neck, shoulders, eyes, cheeks, jaw, arms, hands, legs, feet, ribs, belly, and buttocks) for tension. Now, gently release any area that feels tense. Allow yourself to entirely relax.       I hope you find this handout helpful!    Please consider taking a brief survey to help me learn how helpful these materials have been for you.               Ovidio Martinez MD,PhD   [facility]   [phone]

## 2025-03-12 NOTE — ASSESSMENT & PLAN NOTE
-We discussed that urinary symptoms likely exacerbated with history of diabetes, as well as significant consumption of daily Crystal light.  We also discussed that Jardiance can cause an increased frequency in small percentage of patients.  Can also increase risk of UTI, however, patient did have a negative urinalysis for infection when completed by PCP on 3-7-25 and was having symptoms at that time.  Overall, recommended continuing his current diabetes medications, as good glycemic control is important in management of urinary symptoms.  He did mention that lately his sugars have been running higher at home, between 150-220.  We discussed foods and drinks that are irritative to the bladder can cause increased urinary symptoms.  Patient has seen significant improvement in urinary symptoms since stopping consumption of Crystal light about 10 days ago.  -I did offer to place a referral to pelvic floor physical therapy for stress urinary incontinence.  However, patient defers at this time and states this is because he feels his symptoms have improved.  I did provide him with a written handout for pelvic floor exercises to complete at home written by our urologist, Dr. Martinez.  -Patient advised to follow-up with her service on an as-needed basis.  All questions addressed.  Please do not hesitate to reach out with further questions or concerns.  Orders:    Ambulatory Referral to Urology    POCT Measure PVR

## 2025-03-12 NOTE — PROGRESS NOTES
Name: Danial Dior      : 1985      MRN: [id number]  Encounter Provider: CAT Burch  Encounter Date: 3/12/2025   Encounter department: [facility]  :  Assessment & Plan  Urinary urgency    Orders:    Ambulatory Referral to Urology    POCT Measure PVR    Urinary frequency  -We discussed that urinary symptoms likely exacerbated with history of diabetes, as well as significant consumption of daily Crystal light.  We also discussed that Jardiance can cause an increased frequency in small percentage of patients.  Can also increase risk of UTI, however, patient did have a negative urinalysis for infection when completed by PCP on 3-7-25 and was having symptoms at that time.  Overall, recommended continuing his current diabetes medications, as good glycemic control is important in management of urinary symptoms.  He did mention that lately his sugars have been running higher at home, between 150-220.  We discussed foods and drinks that are irritative to the bladder can cause increased urinary symptoms.  Patient has seen significant improvement in urinary symptoms since stopping consumption of Crystal light about 10 days ago.  -I did offer to place a referral to pelvic floor physical therapy for stress urinary incontinence.  However, patient defers at this time and states this is because he feels his symptoms have improved.  I did provide him with a written handout for pelvic floor exercises to complete at home written by our urologist, Dr. Martinez.  -Patient advised to follow-up with her service on an as-needed basis.  All questions addressed.  Please do not hesitate to reach out with further questions or concerns.  Orders:    Ambulatory Referral to Urology    POCT Measure PVR        History of Present Illness   Danial Dior is a 39 y.o. male who presents for evaluation of lower urinary tract symptoms.  Patient has past medical history of type 2 diabetes, hypertension,  hepatic steatosis, GERD, acute bilateral low back pain with right-sided sciatica, tobacco abuse, hyperlipidemia.    Patient denies previous urologic history or history of  manipulation.  Denies family history of  malignancy.  Denies history of recurrent UTIs and kidney stones.      Patient's PVR is 30 mL today.  Unable to provide urine sample today.  Urinalysis negative for infection 3-7-25.    A1c 6.7 on 1-21-25.  Previously as high as 10.6 in March 2023.  Patient is on Jardiance, Trulicity, metformin, insulin glargine for management of type 2 diabetes.  He states lately his sugars have been slightly elevated, between 150-220 on a daily basis.    CT abdomen pelvis with contrast completed in August 2020 showing unremarkable kidneys.  Unremarkable urinary bladder.    Patient states gradually stream has been a little weaker over the last year, throughout the day will feel like sometimes has increased frequency (can go 3x in an hour). Patient also states sometimes he will feel some pressure in his penis that can last up to a few times as well as pressure in his bladder, but no pain with urination. He states this hasn't happened in the last 10 days, but every time he coughed, sneezed, or laugh he would almost urinate on himself. He states a few times this has happened a few times.  He denies dysuria, flank pain, gross hematuria.      He states after he ejaculates, he will also continue to have some leakage of ejaculate for up to an hour.  We discussed that this is normal.  Patient denies pain with ejaculation, penile discharge, rash, concern for STDs.      He states he drinks a lot of water. He states he has already consumed 3 bottles of water this morning. States since stopping crystal light (drank for 5-6 years) hasn't had urge with coughing or sneezing. He states he proably would drink 32-64 fl oz of crystal light per day. Drinks 1-2 diet sodas throughout the day, but primarily drinks water.     Patient states  overall, since stopping Crystal light 10 days ago (not liking taste anymore) he has noticed that his urinary urgency dissipated, as well as stress urinary incontinence.  We did discuss, however, pelvic for physical therapy is beneficial for stress urinary incontinence.  Patient defers referral at this time.  I did however provide him with a handout made by Dr. Martinez for pelvic floor exercises to complete at home.        AUA SYMPTOM SCORE      Flowsheet Row Most Recent Value   AUA SYMPTOM SCORE    How often have you had a sensation of not emptying your bladder completely after you finished urinating? 4 (P)     How often have you had to urinate again less than two hours after you finished urinating? 4 (P)     How often have you found you stopped and started again several times when you urinate? 3 (P)     How often have you found it difficult to postpone urination? 1 (P)     How often have you had a weak urinary stream? 2 (P)     How often have you had to push or strain to begin urination? 2 (P)     How many times did you most typically get up to urinate from the time you went to bed at night until the time you got up in the morning? 1 (P)     Quality of Life: If you were to spend the rest of your life with your urinary condition just the way it is now, how would you feel about that? 4 (P)     AUA SYMPTOM SCORE 17 (P)            Review of Systems   Constitutional:  Negative for activity change, chills, fatigue and fever.   HENT:  Negative for congestion, rhinorrhea and sore throat.    Eyes:  Negative for photophobia, redness and visual disturbance.   Respiratory:  Negative for cough, shortness of breath and wheezing.    Cardiovascular:  Negative for chest pain, palpitations and leg swelling.   Gastrointestinal:  Negative for abdominal pain, constipation, diarrhea, nausea and vomiting.   Genitourinary:  Positive for frequency and urgency. Negative for difficulty urinating, dysuria, flank pain and hematuria.         "Stress urinary incontinence (resolved)   Neurological:  Negative for weakness, light-headedness and headaches.          Objective   /82 (BP Location: Left arm, Patient Position: Sitting, Cuff Size: Large)   Pulse 103   Ht 6' 3\" (1.905 m)   Wt (!) 155 kg (341 lb 12.8 oz)   SpO2 99%   BMI 42.72 kg/m²     Physical Exam  Vitals and nursing note reviewed.   Constitutional:       General: He is not in acute distress.     Appearance: He is well-developed.   HENT:      Head: Normocephalic and atraumatic.   Eyes:      Conjunctiva/sclera: Conjunctivae normal.   Cardiovascular:      Rate and Rhythm: Normal rate and regular rhythm.      Heart sounds: No murmur heard.  Pulmonary:      Effort: Pulmonary effort is normal. No respiratory distress.      Breath sounds: Normal breath sounds.   Abdominal:      Palpations: Abdomen is soft.      Tenderness: There is no abdominal tenderness.   Musculoskeletal:         General: No swelling.      Cervical back: Neck supple.   Skin:     General: Skin is warm and dry.      Capillary Refill: Capillary refill takes less than 2 seconds.   Neurological:      Mental Status: He is alert.   Psychiatric:         Mood and Affect: Mood normal.          Results   No results found for: \"PSA\"  Lab Results   Component Value Date    CALCIUM 9.0 01/31/2025    K 4.0 01/31/2025    CO2 25 01/31/2025     01/31/2025    BUN 16 01/31/2025    CREATININE 0.77 01/31/2025     Lab Results   Component Value Date    WBC 10.33 (H) 03/23/2023    HGB 16.9 03/23/2023    HCT 47.7 03/23/2023    MCV 86 03/23/2023     03/23/2023       Office Urine Dip  Recent Results (from the past hour)   POCT Measure PVR    Collection Time: 03/12/25  9:26 AM   Result Value Ref Range    POST-VOID RESIDUAL VOLUME, ML POC 30 mL         "

## 2025-03-19 DIAGNOSIS — E11.65 TYPE 2 DIABETES MELLITUS WITH HYPERGLYCEMIA, WITH LONG-TERM CURRENT USE OF INSULIN (HCC): ICD-10-CM

## 2025-03-19 DIAGNOSIS — Z79.4 TYPE 2 DIABETES MELLITUS WITH HYPERGLYCEMIA, WITH LONG-TERM CURRENT USE OF INSULIN (HCC): ICD-10-CM

## 2025-03-19 RX ORDER — METFORMIN HYDROCHLORIDE 500 MG/1
1000 TABLET, EXTENDED RELEASE ORAL 2 TIMES DAILY WITH MEALS
Qty: 360 TABLET | Refills: 1 | Status: SHIPPED | OUTPATIENT
Start: 2025-03-19

## 2025-04-01 ENCOUNTER — NURSE TRIAGE (OUTPATIENT)
Age: 40
End: 2025-04-01

## 2025-04-01 DIAGNOSIS — K21.9 GASTROESOPHAGEAL REFLUX DISEASE, UNSPECIFIED WHETHER ESOPHAGITIS PRESENT: ICD-10-CM

## 2025-04-01 DIAGNOSIS — Z79.4 TYPE 2 DIABETES MELLITUS WITH HYPERGLYCEMIA, WITH LONG-TERM CURRENT USE OF INSULIN (HCC): ICD-10-CM

## 2025-04-01 DIAGNOSIS — E11.65 TYPE 2 DIABETES MELLITUS WITH HYPERGLYCEMIA, WITH LONG-TERM CURRENT USE OF INSULIN (HCC): ICD-10-CM

## 2025-04-01 DIAGNOSIS — E11.65 TYPE 2 DIABETES MELLITUS WITH HYPERGLYCEMIA, UNSPECIFIED WHETHER LONG TERM INSULIN USE (HCC): ICD-10-CM

## 2025-04-01 RX ORDER — DULAGLUTIDE 3 MG/.5ML
INJECTION, SOLUTION SUBCUTANEOUS
Qty: 6 ML | Refills: 3 | Status: SHIPPED | OUTPATIENT
Start: 2025-04-01

## 2025-04-01 NOTE — TELEPHONE ENCOUNTER
"FOLLOW UP: call back tomorrow    REASON FOR CONVERSATION: Hypoglycemia    SYMPTOMS: none    OTHER: changed diet, sugars running lower past 3 days. Held medications.    DISPOSITION: 24 Hour Provider Response      Patient called stating he made changes to his diet, and has noticed his sugars the past few days have been running in the 80-90's. He didn't take his Toujeo last night as he was low, and he held his metformin and jardiance for breakfast. I advised to hold for dinner if he is still low, and same thing with tonight to hold his insulin if he is still in the 80-90. Advised to have a snack before bed if he is running low to maintain his sugars through the night. He is asking for the provider to review and make further adjustments with his medication. Medication on file matches what he is taking. Please advise.     Answer Assessment - Initial Assessment Questions  1. SYMPTOMS: \"What symptoms are you concerned about?\"      No symptoms  2. ONSET:  \"When did the symptoms start?\"      Past few days  3. BLOOD GLUCOSE: \"What is your blood glucose level?\"       Running in the 80's and 90's  4. USUAL RANGE: \"What is your blood glucose level usually?\" (e.g., usual fasting morning value, usual evening value)      -  5. TYPE 1 or 2:  \"Do you know what type of diabetes you have?\"  (e.g., Type 1, Type 2, Gestational; doesn't know)       Type 2  6. INSULIN: \"Do you take insulin?\" \"What type of insulin(s) do you use? What is the mode of delivery? (syringe, pen; injection or pump) \"When did you last give yourself an insulin dose?\" (i.e., time or hours/minutes ago) \"How much did you give?\" (i.e., how many units)      Yes, but didn't take it last night  7. DIABETES PILLS: \"Do you take any pills for your diabetes?\" If Yes, ask: \"What is the name of the medicine(s) that you take for high blood sugar?\"      Yes, but held them both today   8. OTHER SYMPTOMS: \"Do you have any symptoms?\" (e.g., fever, frequent urination, difficulty " "breathing, vomiting)      denies  9. LOW BLOOD GLUCOSE TREATMENT: \"What have you done so far to treat the low blood glucose level?\"      Ate a snack   10. FOOD: \"When did you last eat or drink?\"        -  11. ALONE: \"Are you alone right now or is someone with you?\"         -    Protocols used: Diabetes - Low Blood Sugar-Adult-OH    "

## 2025-04-02 RX ORDER — METFORMIN HYDROCHLORIDE 500 MG/1
500 TABLET, EXTENDED RELEASE ORAL 2 TIMES DAILY WITH MEALS
Start: 2025-04-02

## 2025-04-02 RX ORDER — OMEPRAZOLE 20 MG/1
20 CAPSULE, DELAYED RELEASE ORAL DAILY
Qty: 180 CAPSULE | Refills: 1 | Status: SHIPPED | OUTPATIENT
Start: 2025-04-02

## 2025-04-02 NOTE — TELEPHONE ENCOUNTER
Spoke with pt he expressed understanding he does state he has not took insulin in 4 days and his sugar has been trending under 100 this morning his sugar was 90 and last night it was 85  but I did tell him about the med changes and to keep us updated

## 2025-04-02 NOTE — TELEPHONE ENCOUNTER
Please inform patient to stop Toujeo, and Jardiance  He should continue Trulicity 3 mg once a week, metformin  mg , 1 tablet twice a day    He should continue to check blood sugar twice daily before breakfast and before dinner, goal for blood sugar is 80 to 180 mg per DL.  If blood sugars are elevated then he should send us the log

## 2025-04-22 ENCOUNTER — OFFICE VISIT (OUTPATIENT)
Dept: PODIATRY | Facility: CLINIC | Age: 40
End: 2025-04-22
Payer: MEDICARE

## 2025-04-22 VITALS — HEIGHT: 75 IN | WEIGHT: 315 LBS | BODY MASS INDEX: 39.17 KG/M2

## 2025-04-22 DIAGNOSIS — M21.6X9 OTHER ACQUIRED DEFORMITIES OF UNSPECIFIED FOOT: ICD-10-CM

## 2025-04-22 DIAGNOSIS — E11.65 TYPE 2 DIABETES MELLITUS WITH HYPERGLYCEMIA, UNSPECIFIED WHETHER LONG TERM INSULIN USE (HCC): Primary | ICD-10-CM

## 2025-04-22 PROCEDURE — 99213 OFFICE O/P EST LOW 20 MIN: CPT | Performed by: PODIATRIST

## 2025-04-22 NOTE — PROGRESS NOTES
PATIENT:  Danial Dior    1985    ASSESSMENT:     1. Type 2 diabetes mellitus with hyperglycemia, unspecified whether long term insulin use (HCC)        2. Other acquired deformities of unspecified foot                PLAN:  1.  Reviewed medical records.  Patient was counseled on the condition and diagnosis.    2.  Educated disease prevention and risks related to diabetes.    3.  Educated proper daily foot care and exam.  Instructed proper skin care / protection and footwear.    4.  The recent blood work was reviewed / discussed and the last HbA1c was 6.7.  Discussed proper blood glucose control with diet and exercise.    5. He has calluses due to foot deformity.  Lesions were trimmed.  Discussed surgical option for Tailor's bunion depending on the progress.  Continue 40% Urea cream and home care.    6. He has low risk diabetic foot and will return in 1 year for diabetic foot exam.      Imaging: I have personally reviewed pertinent films in PACS  Labs, pathology, and Other Studies: I have personally reviewed pertinent reports.        Subjective:          HPI  The patient presents for diabetic foot evaluation.  BS is under control.  No history of diabetic foot ulcer or related foot infection.  No significant numbness.  Denied weakness or significant functional deficit.  No acute pedal disorders.      The following portions of the patient's history were reviewed and updated as appropriate: allergies, current medications, past family history, past medical history, past social history, past surgical history and problem list.  All pertinent labs and images were reviewed.    Past Medical History  Past Medical History:   Diagnosis Date    Acute pancreatitis 03/26/2023    Callus     Diabetes mellitus (HCC) 03/26/2023    Hypertension 03/26/2023    Obesity     Plantar fasciitis        Past Surgical History  Past Surgical History:   Procedure Laterality Date    KNEE SURGERY      MOUTH SURGERY      tongue    NO  PAST SURGERIES      WI ARTHRS KNE SURG W/MENISCECTOMY MED/LAT W/SHVG Left 2020    Procedure: KNEE ARTHROSCOPY PARTIAL LATERAL MENISCECTOMY;  Surgeon: Lasha Magaña DO;  Location: [facility];  Service: Orthopedics        Allergies:  Lantus [insulin glargine]    Medications:  Current Outpatient Medications   Medication Sig Dispense Refill    amLODIPine (NORVASC) 5 mg tablet Take 1 tablet (5 mg total) by mouth daily for 200 doses 100 tablet 1    Blood Glucose Monitoring Suppl (OneTouch Verio) w/Device KIT Use 2 (two) times a day 1 kit 0    Dulaglutide (Trulicity) 3 MG/0.5ML SOAJ Inject 3 mg once a week 6 mL 3    Insulin Pen Needle (BD Pen Needle Princess 2nd Gen) 32G X 4 MM MISC Use if needed (test blood sugar) 100 each 3    Lancets (OneTouch Delica Plus [id number]) MISC USE 2 (TWO) TIMES A  each 1    Omega-3 Fatty Acids (FISH OIL PO) Take 1,200 mg by mouth every morning      omeprazole (PriLOSEC) 20 mg delayed release capsule Take 1 capsule (20 mg total) by mouth daily 180 capsule 1    OneTouch Verio test strip USE 1 EACH 2 (TWO) TIMES A DAY USE AS INSTRUCTED 200 strip 1    metFORMIN (GLUCOPHAGE-XR) 500 mg 24 hr tablet Take 1 tablet (500 mg total) by mouth 2 (two) times a day with meals (Patient not taking: Reported on 2025)      mometasone (ELOCON) 0.1 % cream Apply topically daily for 7 days 120 g 0     No current facility-administered medications for this visit.       Social History:  Social History     Socioeconomic History    Marital status: Single     Spouse name: None    Number of children: None    Years of education: None    Highest education level: None   Occupational History    None   Tobacco Use    Smoking status: Some Days     Current packs/day: 0.00     Average packs/day: 0.3 packs/day for 21.4 years (5.4 ttl pk-yrs)     Types: Cigarettes     Start date: 2002     Last attempt to quit: 2024     Years since quittin.0     Passive exposure: Past    Smokeless tobacco: Never   Vaping  "Use    Vaping status: Never Used   Substance and Sexual Activity    Alcohol use: Yes     Alcohol/week: 10.0 standard drinks of alcohol     Types: 10 Cans of beer per week     Comment: 1 time per month 10 beers    Drug use: Never    Sexual activity: Yes     Partners: Female     Birth control/protection: Female Sterilization   Other Topics Concern    None   Social History Narrative    None     Social Drivers of Health     Financial Resource Strain: Not on file   Food Insecurity: Not on file   Transportation Needs: Not on file   Physical Activity: Not on file   Stress: Not on file   Social Connections: Not on file   Intimate Partner Violence: Not on file   Housing Stability: Not on file        Review of Systems   Constitutional:  Negative for chills and fever.   Respiratory:  Negative for cough and shortness of breath.    Cardiovascular:  Negative for chest pain and leg swelling.   Gastrointestinal:  Negative for nausea and vomiting.   Musculoskeletal:  Negative for gait problem.   Skin:  Negative for wound.   Neurological:  Negative for weakness and numbness.   Hematological: Negative.    Psychiatric/Behavioral:  Negative for confusion.          Objective:      Ht 6' 3\" (1.905 m)   Wt (!) 151 kg (332 lb)   BMI 41.50 kg/m²          Physical Exam  Vitals reviewed.   Constitutional:       General: He is not in acute distress.     Appearance: He is obese. He is not toxic-appearing.   Cardiovascular:      Rate and Rhythm: Normal rate and regular rhythm.      Pulses: Normal pulses. no weak pulses.           Dorsalis pedis pulses are 2+ on the right side and 2+ on the left side.        Posterior tibial pulses are 2+ on the right side and 2+ on the left side.   Pulmonary:      Effort: Pulmonary effort is normal. No respiratory distress.   Musculoskeletal:         General: Deformity present. No swelling, tenderness or signs of injury.      Right lower leg: No edema.      Left lower leg: No edema.      Right foot: No Charcot " foot or foot drop.      Left foot: No Charcot foot or foot drop.      Comments: Hammertoes noted.  Tight achilles / calf with ankle equinus.  Tailor's bunion bilaterally with prominent 5th met head.     Feet:      Right foot:      Protective Sensation: 10 sites tested.  10 sites sensed.      Skin integrity: Callus present. No ulcer, skin breakdown or erythema.      Left foot:      Protective Sensation: 10 sites tested.  10 sites sensed.      Skin integrity: Callus present. No ulcer, skin breakdown or erythema.   Skin:     General: Skin is warm.      Capillary Refill: Capillary refill takes less than 2 seconds.      Coloration: Skin is not cyanotic or mottled.      Findings: No abscess, ecchymosis, erythema, rash or wound.      Nails: There is no clubbing.      Comments: Hyperkeratotic skin lesion on bilateral 5th met head plantarlaterally.   Neurological:      General: No focal deficit present.      Mental Status: He is alert and oriented to person, place, and time.      Cranial Nerves: No cranial nerve deficit.      Sensory: No sensory deficit.      Motor: No weakness.      Coordination: Coordination normal.      Gait: Gait normal.      Deep Tendon Reflexes: Reflexes normal.   Psychiatric:         Mood and Affect: Mood normal.         Behavior: Behavior normal.         Thought Content: Thought content normal.         Judgment: Judgment normal.           Diabetic Foot Exam    Patient's shoes and socks removed.    Right Foot/Ankle   Right Foot Inspection  Skin Exam: skin intact, callus and callus. No erythema, no maceration, no pre-ulcer and no ulcer.     Toe Exam: right toe deformity. No swelling, no tenderness and erythema    Sensory   Vibration: intact  Proprioception: intact  Monofilament testing: intact    Vascular  Capillary refills: < 3 seconds  The right DP pulse is 2+. The right PT pulse is 2+.     Left Foot/Ankle  Left Foot Inspection  Skin Exam: skin intact and callus. No erythema, no maceration, no  pre-ulcer and no ulcer.     Toe Exam: left toe deformity. No swelling, no tenderness and no erythema.     Sensory   Vibration: intact  Proprioception: intact  Monofilament testing: intact    Vascular  Capillary refills: < 3 seconds  The left DP pulse is 2+. The left PT pulse is 2+.     Assign Risk Category  Deformity present  No loss of protective sensation  No weak pulses  Risk: 0

## 2025-04-22 NOTE — PATIENT INSTRUCTIONS
"Patient Education     Foot care for people with diabetes   The Basics   Written by the doctors and editors at [facility]   Why is foot care important if I have diabetes? -- Diabetes can cause nerve damage if your blood sugar is high for a long time. The medical term for this is \"diabetic neuropathy.\"  If you have problems with the nerves in your feet, you might not be able to feel pain in your foot. Normally, people feel pain when they get a cut or a blister on their foot. The pain tells them that they need to treat their cut so it can heal. But people with nerve damage might not feel any pain when their feet get hurt. They might not even know that they have a cut, so they might not treat it. Problems that aren't treated right away can get much worse. For example, an untreated cut can get infected and turn into an open sore.  High blood sugar can also damage blood vessels and decrease blood flow to your feet. This can weaken your skin and make wounds take longer to heal. You are also more likely to get an infection if you have high blood sugar.  How do I take care of my feet? -- Taking good care of your feet can help prevent foot problems. You should:   Wash your feet every day with soap and warm water. Pat your feet dry, and be sure to dry the skin between your toes.   Keep your feet moisturized. Put lotion on the tops and bottoms of your feet, but not between your toes.   Check your feet every day (figure 1). Look for cuts, blisters, redness, or swelling. Use a mirror, or ask someone to help you check the bottoms of your feet. Check all parts of the foot, especially between the toes. Look for broken skin, ulcers, blisters, or redness.   Trim your toenails straight across when needed (figure 2). Do not cut the corners of your toenails. File rough edges. Do not cut your cuticles. Ask for help if you cannot see well or have problems reaching your feet.   Ask your doctor or nurse to check your feet at each visit. Take " your shoes and socks off for these checks.   See a foot care provider (such as a podiatrist) if you have an ingrown toenail, corn, or callus. Do not try to remove corns and calluses yourself.  How do I protect my feet from injury? -- There are several ways to protect your feet. You can:   Wear shoes and socks at all times, even at home. Do not walk barefoot. Wear swim shoes if you go to the beach or a swimming pool.   Choose shoes that fit well. They should not be not too tight or too loose. Your shoes should have plenty of room for your toes (figure 3). Your doctor might give you a prescription for special shoes. Check to see if they are covered by your insurance.   Check your shoes each time before you put them on to make sure that the lining is smooth. Also check to make sure that there is nothing inside the shoes before putting them on.   Do not wear shoes that expose any part of the foot, like sandals, thongs, or clogs.   Wear cotton socks that fit loosely. Do not wear shoes without socks.   Protect your feet from heat and cold. Test bath water before putting your feet in it to make sure that it is not too hot. Do not walk barefoot on hot ground. Take extra care when going outside in the cold and wear warm socks.  What else should I know? -- You can lower your risk for foot problems by keeping your blood sugar levels as close to your goal as possible. Other things you can do include:   Move your ankles and toes often to help with blood flow. You can wear a support stocking to help with swelling.   Walk often. Regular walking helps blood flow.   If you smoke, try to quit. Your doctor or nurse can help. Smoking causes poor blood flow to your feet and can damage your nerves.  When should I call the doctor? -- Call your doctor or nurse for advice if you have:   A fever of 100.4°F (38°C) or higher, chills, or a wound that will not heal   Swelling, redness, warmth around a wound, a foul smell coming from a wound, or  yellowish, greenish, or bloody discharge   Sores or blisters on your feet that hurt more or less than you would expect   Numbness or tingling in your foot or leg   Corns, calluses, blisters, or new sores on your foot   Very dry, scaly, or cracked skin on your feet   Changes in the way your foot joints or arch look  All topics are updated as new evidence becomes available and our peer review process is complete.  This topic retrieved from [employer] on: Mar 13, 2024.  Topic 008455 Version 2.0  Release: 32.2.4 - C32.71  © 2024 UpToDate, Inc. and/or its affiliates. All rights reserved.  figure 1: Foot check for people with diabetes     People with diabetes should check both of their feet every day. It is important to check your feet all over, including in between your toes. If you can't see the bottom of your foot, use a mirror or ask another person to check for you. Let your doctor or nurse know if you find any:  Redness   Cuts or cracks in the skin   Blisters   Swelling   Graphic 09970 Version 3.0  figure 2: Trim your toenails     Trim your toenails straight across and smooth them with a nail file.  Graphic 45668 Version 2.0  figure 3: Correct shoe shape     Choose shoes that fit the right way and are not too tight or too loose. Your shoes should have plenty of room for your toes.  Graphic 00834 Version 2.0  Consumer Information Use and Disclaimer   Disclaimer: This generalized information is a limited summary of diagnosis, treatment, and/or medication information. It is not meant to be comprehensive and should be used as a tool to help the user understand and/or assess potential diagnostic and treatment options. It does NOT include all information about conditions, treatments, medications, side effects, or risks that may apply to a specific patient. It is not intended to be medical advice or a substitute for the medical advice, diagnosis, or treatment of a health care provider based on the health care provider's  examination and assessment of a patient's specific and unique circumstances. Patients must speak with a health care provider for complete information about their health, medical questions, and treatment options, including any risks or benefits regarding use of medications. This information does not endorse any treatments or medications as safe, effective, or approved for treating a specific patient. UpToDate, Inc. and its affiliates disclaim any warranty or liability relating to this information or the use thereof.The use of this information is governed by the Terms of Use, available at https://www.woltersCurrencyFairuwer.com/en/know/clinical-effectiveness-terms. 2024© UpToDate, Inc. and its affiliates and/or licensors. All rights reserved.  Copyright   © 2024 UpToDate, Inc. and/or its affiliates. All rights reserved.

## 2025-04-30 DIAGNOSIS — K21.9 GASTROESOPHAGEAL REFLUX DISEASE, UNSPECIFIED WHETHER ESOPHAGITIS PRESENT: ICD-10-CM

## 2025-04-30 DIAGNOSIS — E11.65 TYPE 2 DIABETES MELLITUS WITH HYPERGLYCEMIA, UNSPECIFIED WHETHER LONG TERM INSULIN USE (HCC): ICD-10-CM

## 2025-04-30 RX ORDER — OMEPRAZOLE 20 MG/1
20 CAPSULE, DELAYED RELEASE ORAL DAILY
Qty: 180 CAPSULE | Refills: 0 | Status: SHIPPED | OUTPATIENT
Start: 2025-04-30

## 2025-05-01 RX ORDER — DULAGLUTIDE 3 MG/.5ML
INJECTION, SOLUTION SUBCUTANEOUS
Qty: 6 ML | Refills: 1 | Status: SHIPPED | OUTPATIENT
Start: 2025-05-01

## 2025-06-04 DIAGNOSIS — E11.65 TYPE 2 DIABETES MELLITUS WITH HYPERGLYCEMIA, UNSPECIFIED WHETHER LONG TERM INSULIN USE (HCC): ICD-10-CM

## 2025-06-05 RX ORDER — DULAGLUTIDE 3 MG/.5ML
INJECTION, SOLUTION SUBCUTANEOUS
Qty: 6 ML | Refills: 0 | Status: SHIPPED | OUTPATIENT
Start: 2025-06-05

## 2025-06-17 ENCOUNTER — OFFICE VISIT (OUTPATIENT)
Dept: INTERNAL MEDICINE CLINIC | Facility: CLINIC | Age: 40
End: 2025-06-17
Payer: MEDICARE

## 2025-06-17 VITALS
SYSTOLIC BLOOD PRESSURE: 120 MMHG | BODY MASS INDEX: 38.74 KG/M2 | OXYGEN SATURATION: 99 % | HEIGHT: 75 IN | DIASTOLIC BLOOD PRESSURE: 78 MMHG | HEART RATE: 100 BPM | WEIGHT: 311.6 LBS

## 2025-06-17 DIAGNOSIS — K21.9 GASTROESOPHAGEAL REFLUX DISEASE, UNSPECIFIED WHETHER ESOPHAGITIS PRESENT: ICD-10-CM

## 2025-06-17 DIAGNOSIS — Z00.00 ANNUAL PHYSICAL EXAM: Primary | ICD-10-CM

## 2025-06-17 DIAGNOSIS — Z79.4 TYPE 2 DIABETES MELLITUS WITH HYPERGLYCEMIA, WITH LONG-TERM CURRENT USE OF INSULIN (HCC): ICD-10-CM

## 2025-06-17 DIAGNOSIS — E11.65 TYPE 2 DIABETES MELLITUS WITH HYPERGLYCEMIA, WITH LONG-TERM CURRENT USE OF INSULIN (HCC): ICD-10-CM

## 2025-06-17 DIAGNOSIS — R20.2 PARESTHESIA OF HAND, BILATERAL: ICD-10-CM

## 2025-06-17 DIAGNOSIS — I10 ESSENTIAL HYPERTENSION: ICD-10-CM

## 2025-06-17 LAB — SL AMB POCT HEMOGLOBIN AIC: 4.8 (ref ?–6.5)

## 2025-06-17 PROCEDURE — 99395 PREV VISIT EST AGE 18-39: CPT | Performed by: INTERNAL MEDICINE

## 2025-06-17 PROCEDURE — 83036 HEMOGLOBIN GLYCOSYLATED A1C: CPT | Performed by: INTERNAL MEDICINE

## 2025-06-17 PROCEDURE — 99213 OFFICE O/P EST LOW 20 MIN: CPT | Performed by: INTERNAL MEDICINE

## 2025-06-17 NOTE — ASSESSMENT & PLAN NOTE
-Takes Amlodipine intermittently, 3-4 times a week. Instructed patient to check BP daily and send readings in 2-4 weeks. May be able to reduce dose in the future given weight loss. Blood pressure well controlled. Continue current antihypertensive regimen for now.

## 2025-06-17 NOTE — PATIENT INSTRUCTIONS
"Patient Education     Routine physical for adults   The Basics   Written by the doctors and editors at [facility]   What is a physical? -- A physical is a routine visit, or \"check-up,\" with your doctor. You might also hear it called a \"wellness visit\" or \"preventive visit.\"  During each visit, the doctor will:   Ask about your physical and mental health   Ask about your habits, behaviors, and lifestyle   Do an exam   Give you vaccines if needed   Talk to you about any medicines you take   Give advice about your health   Answer your questions  Getting regular check-ups is an important part of taking care of your health. It can help your doctor find and treat any problems you have. But it's also important for preventing health problems.  A routine physical is different from a \"sick visit.\" A sick visit is when you see a doctor because of a health concern or problem. Since physicals are scheduled ahead of time, you can think about what you want to ask the doctor.  How often should I get a physical? -- It depends on your age and health. In general, for people age 21 years and older:   If you are younger than 50 years, you might be able to get a physical every 3 years.   If you are 50 years or older, your doctor might recommend a physical every year.  If you have an ongoing health condition, like diabetes or high blood pressure, your doctor will probably want to see you more often.  What happens during a physical? -- In general, each visit will include:   Physical exam - The doctor or nurse will check your height, weight, heart rate, and blood pressure. They will also look at your eyes and ears. They will ask about how you are feeling and whether you have any symptoms that bother you.   Medicines - It's a good idea to bring a list of all the medicines you take to each doctor visit. Your doctor will talk to you about your medicines and answer any questions. Tell them if you are having any side effects that bother you. You " "should also tell them if you are having trouble paying for any of your medicines.   Habits and behaviors - This includes:   Your diet   Your exercise habits   Whether you smoke, drink alcohol, or use drugs   Whether you are sexually active   Whether you feel safe at home  Your doctor will talk to you about things you can do to improve your health and lower your risk of health problems. They will also offer help and support. For example, if you want to quit smoking, they can give you advice and might prescribe medicines. If you want to improve your diet or get more physical activity, they can help you with this, too.   Lab tests, if needed - The tests you get will depend on your age and situation. For example, your doctor might want to check your:   Cholesterol   Blood sugar   Iron level   Vaccines - The recommended vaccines will depend on your age, health, and what vaccines you already had. Vaccines are very important because they can prevent certain serious or deadly infections.   Discussion of screening - \"Screening\" means checking for diseases or other health problems before they cause symptoms. Your doctor can recommend screening based on your age, risk, and preferences. This might include tests to check for:   Cancer, such as breast, prostate, cervical, ovarian, colorectal, prostate, lung, or skin cancer   Sexually transmitted infections, such as chlamydia and gonorrhea   Mental health conditions like depression and anxiety  Your doctor will talk to you about the different types of screening tests. They can help you decide which screenings to have. They can also explain what the results might mean.   Answering questions - The physical is a good time to ask the doctor or nurse questions about your health. If needed, they can refer you to other doctors or specialists, too.  Adults older than 65 years often need other care, too. As you get older, your doctor will talk to you about:   How to prevent falling at " home   Hearing or vision tests   Memory testing   How to take your medicines safely   Making sure that you have the help and support you need at home  All topics are updated as new evidence becomes available and our peer review process is complete.  This topic retrieved from [employer] on: May 02, 2024.  Topic 893698 Version 1.0  Release: 32.4.3 - C32.122  © 2024 UpToDate, Inc. and/or its affiliates. All rights reserved.  Consumer Information Use and Disclaimer   Disclaimer: This generalized information is a limited summary of diagnosis, treatment, and/or medication information. It is not meant to be comprehensive and should be used as a tool to help the user understand and/or assess potential diagnostic and treatment options. It does NOT include all information about conditions, treatments, medications, side effects, or risks that may apply to a specific patient. It is not intended to be medical advice or a substitute for the medical advice, diagnosis, or treatment of a health care provider based on the health care provider's examination and assessment of a patient's specific and unique circumstances. Patients must speak with a health care provider for complete information about their health, medical questions, and treatment options, including any risks or benefits regarding use of medications. This information does not endorse any treatments or medications as safe, effective, or approved for treating a specific patient. UpToDate, Inc. and its affiliates disclaim any warranty or liability relating to this information or the use thereof.The use of this information is governed by the Terms of Use, available at https://www.woltersTRDatauwer.com/en/know/clinical-effectiveness-terms. 2024© UpToDate, Inc. and its affiliates and/or licensors. All rights reserved.  Copyright   © 2024 UpToDate, Inc. and/or its affiliates. All rights reserved.

## 2025-06-17 NOTE — ASSESSMENT & PLAN NOTE
-POC A1c now down down to 4.8 from 6.7. Continue Trulicity 3mg weekly as prescribed.     Lab Results   Component Value Date    HGBA1C 4.8 06/17/2025     Orders:  •  POCT hemoglobin A1c

## 2025-06-17 NOTE — PROGRESS NOTES
Adult Annual Physical  Name: Danial Dior      : 1985      MRN: [id number]  Encounter Provider: Conrado Stephen MD  Encounter Date: 2025   Encounter department: [facility]    :  Assessment & Plan  Annual physical exam         Essential hypertension  -Takes Amlodipine intermittently, 3-4 times a week. Instructed patient to check BP daily and send readings in 2-4 weeks. May be able to reduce dose in the future given weight loss. Blood pressure well controlled. Continue current antihypertensive regimen for now.        Type 2 diabetes mellitus with hyperglycemia, with long-term current use of insulin ([facility])  -POC A1c now down down to 4.8 from 6.7. Continue Trulicity 3mg weekly as prescribed.     Lab Results   Component Value Date    HGBA1C 4.8 2025     Orders:  •  POCT hemoglobin A1c    Gastroesophageal reflux disease, unspecified whether esophagitis present  -Well controlled on Prilosec.        Paresthesia of hand, bilateral  Recent TSH level normal.  Check B12 level.  Suspect symptoms may be secondary to carpal tunnel syndrome.  An EMG has been ordered for further evaluation.  Orders:  •  Vitamin B12; Future  •  EMG; Future      Preventive Screenings:    - Prostate cancer screening: screening not indicated          History of Present Illness     Adult Annual Physical  Patient presents for chronic follow-up of his type II diabetes. Since his las visit he reports he has stopped taking his metformin and Lantus.  He has been off of both for around 2 to 3 months.  He states with dietary changes he has lost a significant amount of weight.  He has instituted a very low carbohydrate diet.  While on his previous regimen he reports he was experiencing frequent episodes of hypoglycemia and at times it was difficult to get his blood sugars above 70.    Today he complains intermittent left hand numbness and tingling over the pas 2-3 months. He reports it is sometimes associated  "with cramping as well. Symptoms occur sporadically on a daily basis. De denies any associated weakness.     Review of Systems  All other systems negative except for pertinent findings noted in HPI.     Past Medical History   Past Medical History[1]  Past Surgical History[2]  Family History[3]   reports that he quit smoking about 14 months ago. His smoking use included cigarettes. He started smoking about 22 years ago. He has a 5.4 pack-year smoking history. He has been exposed to tobacco smoke. He has never used smokeless tobacco. He reports current alcohol use of about 10.0 standard drinks of alcohol per week. He reports that he does not use drugs.  Current Outpatient Medications   Medication Instructions   • amLODIPine (NORVASC) 5 mg, Oral, Daily   • Blood Glucose Monitoring Suppl (OneTouch Verio) w/Device KIT Does not apply, 2 times daily   • Dulaglutide (Trulicity) 3 MG/0.5ML SOAJ Inject 3 mg once a week   • Insulin Pen Needle (BD Pen Needle Princess 2nd Gen) 32G X 4 MM MISC Other, As needed   • Lancets (OneTouch Delica Plus [id number]) MISC USE 2 (TWO) TIMES A DAY   • metFORMIN (GLUCOPHAGE-XR) 500 mg, Oral, 2 times daily with meals   • mometasone (ELOCON) 0.1 % cream Topical, Daily   • Omega-3 Fatty Acids (FISH OIL PO) 1,200 mg, Every morning   • omeprazole (PRILOSEC) 20 mg, Oral, Daily   • OneTouch Verio test strip 1 each, Other, 2 times daily, Use as instructed   Allergies[4]     Objective   /78 (BP Location: Left arm, Patient Position: Sitting, Cuff Size: Large)   Pulse 100   Ht 6' 3\" (1.905 m)   Wt (!) 141 kg (311 lb 9.6 oz)   SpO2 99%   BMI 38.95 kg/m²     Physical Exam  General: NAD  HEENT: NCAT, EOMI, normal conjunctiva  Cardiovascular: RRR, normal S1 and S2, no m/r/g  Pulmonary: Normal respiratory effort, no wheezes, rales or rhonchi  GI: Soft, nontender, nondistended, normoactive bowel sounds  Musculoskeletal: Normal bulk and tone  Neuro: Sensation grossly intact bilaterally, Phalen's (+) on " left, Tinel's absent  Extremities: No lower extremity edema  Skin: Normal skin color, no rashes   Psychiatric: Normal mood and affect             [1]  Past Medical History:  Diagnosis Date   • Acute pancreatitis 03/26/2023   • Callus    • Diabetes mellitus (HCC) 3/26/23   • Hypertension 3/26/23   • Obesity    • Plantar fasciitis    [2]  Past Surgical History:  Procedure Laterality Date   • KNEE SURGERY     • MOUTH SURGERY      tongue   • NO PAST SURGERIES     • ID ARTHRS KNE SURG W/MENISCECTOMY MED/LAT W/SHVG Left 09/08/2020    Procedure: KNEE ARTHROSCOPY PARTIAL LATERAL MENISCECTOMY;  Surgeon: Lasha Magaña DO;  Location: [facility];  Service: Orthopedics   [3]  Family History  Problem Relation Name Age of Onset   • Hypertension Mother Kacie Moscoso    • Hypertension Father Danial Ovi    • Diabetes Father Danial Dior    • Diabetes type II Father Danial Dior    [4]  Allergies  Allergen Reactions   • Lantus [Insulin Glargine] Rash

## 2025-07-10 ENCOUNTER — APPOINTMENT (OUTPATIENT)
Dept: LAB | Facility: AMBULARY SURGERY CENTER | Age: 40
End: 2025-07-10
Payer: MEDICARE

## 2025-07-10 DIAGNOSIS — R20.2 PARESTHESIA OF HAND, BILATERAL: ICD-10-CM

## 2025-07-10 DIAGNOSIS — E11.65 TYPE 2 DIABETES MELLITUS WITH HYPERGLYCEMIA, UNSPECIFIED WHETHER LONG TERM INSULIN USE (HCC): ICD-10-CM

## 2025-07-10 LAB
ALBUMIN SERPL BCG-MCNC: 4.3 G/DL (ref 3.5–5)
ALP SERPL-CCNC: 50 U/L (ref 34–104)
ALT SERPL W P-5'-P-CCNC: 21 U/L (ref 7–52)
ANION GAP SERPL CALCULATED.3IONS-SCNC: 11 MMOL/L (ref 4–13)
AST SERPL W P-5'-P-CCNC: 15 U/L (ref 13–39)
BILIRUB SERPL-MCNC: 0.47 MG/DL (ref 0.2–1)
BUN SERPL-MCNC: 17 MG/DL (ref 5–25)
CALCIUM SERPL-MCNC: 8.9 MG/DL (ref 8.4–10.2)
CHLORIDE SERPL-SCNC: 108 MMOL/L (ref 96–108)
CO2 SERPL-SCNC: 21 MMOL/L (ref 21–32)
CREAT SERPL-MCNC: 0.92 MG/DL (ref 0.6–1.3)
CREAT UR-MCNC: 213.9 MG/DL
GFR SERPL CREATININE-BSD FRML MDRD: 104 ML/MIN/1.73SQ M
GLUCOSE SERPL-MCNC: 99 MG/DL (ref 65–140)
MICROALBUMIN UR-MCNC: <7 MG/L
POTASSIUM SERPL-SCNC: 4.3 MMOL/L (ref 3.5–5.3)
PROT SERPL-MCNC: 7.5 G/DL (ref 6.4–8.4)
SODIUM SERPL-SCNC: 140 MMOL/L (ref 135–147)
VIT B12 SERPL-MCNC: 387 PG/ML (ref 180–914)

## 2025-07-10 PROCEDURE — 82043 UR ALBUMIN QUANTITATIVE: CPT

## 2025-07-10 PROCEDURE — 80053 COMPREHEN METABOLIC PANEL: CPT

## 2025-07-10 PROCEDURE — 82570 ASSAY OF URINE CREATININE: CPT

## 2025-07-10 PROCEDURE — 36415 COLL VENOUS BLD VENIPUNCTURE: CPT

## 2025-07-10 PROCEDURE — 82607 VITAMIN B-12: CPT

## 2025-07-14 DIAGNOSIS — I10 PRIMARY HYPERTENSION: ICD-10-CM

## 2025-07-14 RX ORDER — AMLODIPINE BESYLATE 5 MG/1
5 TABLET ORAL DAILY
Qty: 100 TABLET | Refills: 1 | Status: SHIPPED | OUTPATIENT
Start: 2025-07-14 | End: 2026-01-30

## 2025-07-20 DIAGNOSIS — E11.65 TYPE 2 DIABETES MELLITUS WITH HYPERGLYCEMIA, UNSPECIFIED WHETHER LONG TERM INSULIN USE (HCC): ICD-10-CM

## 2025-07-21 RX ORDER — DULAGLUTIDE 3 MG/.5ML
INJECTION, SOLUTION SUBCUTANEOUS
Qty: 6 ML | Refills: 0 | Status: SHIPPED | OUTPATIENT
Start: 2025-07-21

## (undated) DEVICE — VAPR COOLPULSE 90 ELECTRODE 90 DEGREES SUCTION WITH INTEGRATED HANDPIECE: Brand: VAPR COOLPULSE

## (undated) DEVICE — BETHLEHEM UNIVERSAL  ARTHRO PK: Brand: CARDINAL HEALTH

## (undated) DEVICE — TUBING ARTHROSCOPY REDUCE PUMP

## (undated) DEVICE — GLOVE INDICATOR PI UNDERGLOVE SZ 8 BLUE

## (undated) DEVICE — SUT ETHILON 3-0 PS-1 18 IN 1663G

## (undated) DEVICE — SYRINGE 10ML LL

## (undated) DEVICE — ABDOMINAL PAD: Brand: DERMACEA

## (undated) DEVICE — ACE WRAP 6 IN STERILE

## (undated) DEVICE — PADDING CAST 4 IN  COTTON STRL

## (undated) DEVICE — GLOVE SRG BIOGEL ECLIPSE 8

## (undated) DEVICE — TUBING ARTHROSCOPY REDUCE PATIENT

## (undated) DEVICE — OCCLUSIVE GAUZE STRIP,3% BISMUTH TRIBROMOPHENATE IN PETROLATUM BLEND: Brand: XEROFORM

## (undated) DEVICE — INTENDED FOR TISSUE SEPARATION, AND OTHER PROCEDURES THAT REQUIRE A SHARP SURGICAL BLADE TO PUNCTURE OR CUT.: Brand: BARD-PARKER ® CARBON RIB-BACK BLADES

## (undated) DEVICE — NEEDLE 22 G X 1 1/2 SAFETY

## (undated) DEVICE — TUBING SUCTION 5MM X 12 FT

## (undated) DEVICE — BLADE SHAVER DISSECTOR 3.5MM 13CM COOLCUT

## (undated) DEVICE — IMPERVIOUS STOCKINETTE: Brand: DEROYAL

## (undated) DEVICE — CHLORAPREP HI-LITE 26ML ORANGE

## (undated) DEVICE — ACE WRAP 4 IN STERILE

## (undated) DEVICE — VIAL DECANTER

## (undated) DEVICE — GAUZE SPONGES,16 PLY: Brand: CURITY